# Patient Record
Sex: FEMALE | Race: BLACK OR AFRICAN AMERICAN | NOT HISPANIC OR LATINO | URBAN - METROPOLITAN AREA
[De-identification: names, ages, dates, MRNs, and addresses within clinical notes are randomized per-mention and may not be internally consistent; named-entity substitution may affect disease eponyms.]

---

## 2022-10-02 ENCOUNTER — INPATIENT (INPATIENT)
Facility: HOSPITAL | Age: 82
LOS: 4 days | Discharge: HOME HEALTH | End: 2022-10-07
Attending: HOSPITALIST | Admitting: HOSPITALIST

## 2022-10-02 VITALS
RESPIRATION RATE: 18 BRPM | TEMPERATURE: 97 F | HEART RATE: 78 BPM | DIASTOLIC BLOOD PRESSURE: 82 MMHG | SYSTOLIC BLOOD PRESSURE: 136 MMHG | OXYGEN SATURATION: 99 %

## 2022-10-02 DIAGNOSIS — N17.9 ACUTE KIDNEY FAILURE, UNSPECIFIED: ICD-10-CM

## 2022-10-02 LAB
ALBUMIN SERPL ELPH-MCNC: 4.3 G/DL — SIGNIFICANT CHANGE UP (ref 3.3–5)
ALP SERPL-CCNC: 97 U/L — SIGNIFICANT CHANGE UP (ref 40–120)
ALT FLD-CCNC: 31 U/L — SIGNIFICANT CHANGE UP (ref 4–33)
ANION GAP SERPL CALC-SCNC: 13 MMOL/L — SIGNIFICANT CHANGE UP (ref 7–14)
APPEARANCE UR: ABNORMAL
AST SERPL-CCNC: 35 U/L — HIGH (ref 4–32)
BASOPHILS # BLD AUTO: 0.01 K/UL — SIGNIFICANT CHANGE UP (ref 0–0.2)
BASOPHILS NFR BLD AUTO: 0.1 % — SIGNIFICANT CHANGE UP (ref 0–2)
BILIRUB SERPL-MCNC: 0.5 MG/DL — SIGNIFICANT CHANGE UP (ref 0.2–1.2)
BILIRUB UR-MCNC: ABNORMAL
BLOOD GAS VENOUS COMPREHENSIVE RESULT: SIGNIFICANT CHANGE UP
BUN SERPL-MCNC: 20 MG/DL — SIGNIFICANT CHANGE UP (ref 7–23)
CALCIUM SERPL-MCNC: 11.1 MG/DL — HIGH (ref 8.4–10.5)
CHLORIDE SERPL-SCNC: 99 MMOL/L — SIGNIFICANT CHANGE UP (ref 98–107)
CO2 SERPL-SCNC: 29 MMOL/L — SIGNIFICANT CHANGE UP (ref 22–31)
COLOR SPEC: YELLOW — SIGNIFICANT CHANGE UP
CREAT SERPL-MCNC: 1.51 MG/DL — HIGH (ref 0.5–1.3)
DIFF PNL FLD: ABNORMAL
EGFR: 34 ML/MIN/1.73M2 — LOW
EOSINOPHIL # BLD AUTO: 0 K/UL — SIGNIFICANT CHANGE UP (ref 0–0.5)
EOSINOPHIL NFR BLD AUTO: 0 % — SIGNIFICANT CHANGE UP (ref 0–6)
GLUCOSE SERPL-MCNC: 150 MG/DL — HIGH (ref 70–99)
GLUCOSE UR QL: ABNORMAL
HCT VFR BLD CALC: 48.6 % — HIGH (ref 34.5–45)
HGB BLD-MCNC: 15 G/DL — SIGNIFICANT CHANGE UP (ref 11.5–15.5)
IANC: 10.33 K/UL — HIGH (ref 1.8–7.4)
IMM GRANULOCYTES NFR BLD AUTO: 0.3 % — SIGNIFICANT CHANGE UP (ref 0–0.9)
KETONES UR-MCNC: NEGATIVE — SIGNIFICANT CHANGE UP
LEUKOCYTE ESTERASE UR-ACNC: NEGATIVE — SIGNIFICANT CHANGE UP
LIDOCAIN IGE QN: 32 U/L — SIGNIFICANT CHANGE UP (ref 7–60)
LYMPHOCYTES # BLD AUTO: 1.11 K/UL — SIGNIFICANT CHANGE UP (ref 1–3.3)
LYMPHOCYTES # BLD AUTO: 9 % — LOW (ref 13–44)
MCHC RBC-ENTMCNC: 28.6 PG — SIGNIFICANT CHANGE UP (ref 27–34)
MCHC RBC-ENTMCNC: 30.9 GM/DL — LOW (ref 32–36)
MCV RBC AUTO: 92.6 FL — SIGNIFICANT CHANGE UP (ref 80–100)
MONOCYTES # BLD AUTO: 0.85 K/UL — SIGNIFICANT CHANGE UP (ref 0–0.9)
MONOCYTES NFR BLD AUTO: 6.9 % — SIGNIFICANT CHANGE UP (ref 2–14)
NEUTROPHILS # BLD AUTO: 10.33 K/UL — HIGH (ref 1.8–7.4)
NEUTROPHILS NFR BLD AUTO: 83.7 % — HIGH (ref 43–77)
NITRITE UR-MCNC: NEGATIVE — SIGNIFICANT CHANGE UP
NRBC # BLD: 0 /100 WBCS — SIGNIFICANT CHANGE UP (ref 0–0)
NRBC # FLD: 0 K/UL — SIGNIFICANT CHANGE UP (ref 0–0)
PH UR: 6 — SIGNIFICANT CHANGE UP (ref 5–8)
PLATELET # BLD AUTO: 278 K/UL — SIGNIFICANT CHANGE UP (ref 150–400)
POTASSIUM SERPL-MCNC: 5.2 MMOL/L — SIGNIFICANT CHANGE UP (ref 3.5–5.3)
POTASSIUM SERPL-SCNC: 5.2 MMOL/L — SIGNIFICANT CHANGE UP (ref 3.5–5.3)
PROT SERPL-MCNC: 8.7 G/DL — HIGH (ref 6–8.3)
PROT UR-MCNC: ABNORMAL
RBC # BLD: 5.25 M/UL — HIGH (ref 3.8–5.2)
RBC # FLD: 12.9 % — SIGNIFICANT CHANGE UP (ref 10.3–14.5)
SARS-COV-2 RNA SPEC QL NAA+PROBE: SIGNIFICANT CHANGE UP
SODIUM SERPL-SCNC: 141 MMOL/L — SIGNIFICANT CHANGE UP (ref 135–145)
SP GR SPEC: 1.02 — SIGNIFICANT CHANGE UP (ref 1.01–1.05)
TROPONIN T, HIGH SENSITIVITY RESULT: 61 NG/L — CRITICAL HIGH
TROPONIN T, HIGH SENSITIVITY RESULT: 80 NG/L — CRITICAL HIGH
UROBILINOGEN FLD QL: SIGNIFICANT CHANGE UP
WBC # BLD: 12.34 K/UL — HIGH (ref 3.8–10.5)
WBC # FLD AUTO: 12.34 K/UL — HIGH (ref 3.8–10.5)

## 2022-10-02 PROCEDURE — 71045 X-RAY EXAM CHEST 1 VIEW: CPT | Mod: 26

## 2022-10-02 PROCEDURE — 99285 EMERGENCY DEPT VISIT HI MDM: CPT

## 2022-10-02 PROCEDURE — 74177 CT ABD & PELVIS W/CONTRAST: CPT | Mod: 26,MA

## 2022-10-02 RX ORDER — SODIUM CHLORIDE 9 MG/ML
1000 INJECTION INTRAMUSCULAR; INTRAVENOUS; SUBCUTANEOUS ONCE
Refills: 0 | Status: COMPLETED | OUTPATIENT
Start: 2022-10-02 | End: 2022-10-02

## 2022-10-02 RX ADMIN — SODIUM CHLORIDE 1000 MILLILITER(S): 9 INJECTION INTRAMUSCULAR; INTRAVENOUS; SUBCUTANEOUS at 20:02

## 2022-10-02 NOTE — ED PROVIDER NOTE - CLINICAL SUMMARY MEDICAL DECISION MAKING FREE TEXT BOX
a/p-82f presents to ed for lethargy today, generalized weakness, and episode of collapse while walking though did NOT pass out. Patient does not recall events which is normal for her per her daughter 2/2 her dementia-Until today has been at baseline SOH. On exam, vs noted, ao2 (baseline), unlabored breathing, clear lungs, abd soft, tender diffusely, exts warm and well perfused, neuro ex with no localizing deficits. Plan for labs, ua/cx, ekg, cxr, ct a/p-eval for diff including but not limited to anemia, elec disturbance, organ dysfunction, infection, obstruction. Hydrate as appears dry. Plan d/w patient and daughter at bedside who are agreeable.

## 2022-10-02 NOTE — ED ADULT NURSE REASSESSMENT NOTE - NS ED NURSE REASSESS COMMENT FT1
Report given to Carmen UPTON in [facility], Patient transported there at this time, no acute distress, vital signs stable, daughter at bedside, all needs met.

## 2022-10-02 NOTE — ED ADULT NURSE REASSESSMENT NOTE - NS ED NURSE REASSESS COMMENT FT1
Patient bradycardic, awake and alert, no acute distress, otherwise vital signs stable, Saleem RABAGO made aware, awaiting further orders/disposition.

## 2022-10-02 NOTE — ED ADULT NURSE REASSESSMENT NOTE - NS ED NURSE REASSESS COMMENT FT1
Patient back from CT, resting in bed comfortably, no acute distress, respirations equal and nonlabored, awaiting further orders/results.

## 2022-10-02 NOTE — ED ADULT NURSE NOTE - OBJECTIVE STATEMENT
82 yof presents A&Ox2 at baseline mental status per daughter at bedside due to hx of dementia. Per daughter, pt was taking a shower then collapsed while waking with daughter who assisted her to the floor. Was awake, denies any LOC or head trauma. No blood thinner use. Daughter states pt has been more lethargic and weak today. Respirations even and unlabored, sating 99% on RA, normal sinus on monitor. Pt denies any chest pain, dyspnea, dizziness, blurry vison, N/V/D. 20g IV placed in R AC, labs and covid swab collected per order. No acute distress noted upon leaving room. bed in lowest position, side rails up, call bell in hand, safety maintained.

## 2022-10-02 NOTE — ED PROVIDER NOTE - PHYSICAL EXAMINATION
GEN - NAD; well appearing; A+O x3   HEAD - NC/AT   EYES- PERRL, EOMI  ENT: Airway patent, mmm, Oral cavity and pharynx normal. No inflammation, swelling, exudate, or lesions.    NECK: Neck supple  PULMONARY - CTA b/l, symmetric breath sounds.   CARDIAC -s1s2, RRR, no M,G,R  ABDOMEN - +BS, ND, NT, soft, no guarding, no rebound, no masses   BACK - no CVA tenderness, Normal  spine   EXTREMITIES - FROM, no edema, no deformity  SKIN - no rash or bruising   NEUROLOGIC - alert, speech clear, no focal deficits  PSYCH -nl mood/affect, nl insight. GEN - NAD; well appearing; A+O to name, place  HEAD - NC/AT   EYES- PERRL, EOMI  ENT: Airway patent, dry mm, Oral cavity and pharynx normal. No inflammation, swelling, exudate, or lesions.    NECK: Neck supple  PULMONARY - CTA b/l, symmetric breath sounds.   CARDIAC -s1s2, RRR, no M,G,R  ABDOMEN - +BS, ND, + tender diffusely, no mass palpated  BACK - no CVA tenderness, Normal  spine   EXTREMITIES - FROM, no edema, no deformity  SKIN - no rash or bruising   NEUROLOGIC - alert, speech clear, 5/5 strenght bl ue and le, SILT, cn2-12 intact, no pronator drift.  PSYCH -limited insight into condition, calm, pleasant

## 2022-10-02 NOTE — ED PROVIDER NOTE - OBJECTIVE STATEMENT
81 y/o f presents to the ed with lethargy and generalized weakness-NOT SYNCOPE. Patient has h/o dementia-does not recall events of today. Reports she feels well at this time. Reports she has had intermittent discomfort to her abd in past typically after eating, none at this time. Daughter provides collateral at bedside-states today patient was much more tired than usual, slept a lot during the day. Was in the shower and daughter assisted her our, and while she was walking she collapsed. She was awake the entire time per the daughter-the daughter was holding her and assisted her down to the ground, no trauma, she was conversing the whole time but was very generally weak, and now back to baseline. Per daughter-patient has poor recall at baseline. She reports until today patient has been acting at her baseline. No recent fevers, cough, vomiting, diarrhea. Has no c/o ha/cp/sob. She is incontinent at baseline.

## 2022-10-02 NOTE — ED ADULT TRIAGE NOTE - CHIEF COMPLAINT QUOTE
pt brought in by ems for syncopal episode after daughter walked pt to bathroom to shower, on way back to room pt had 1 min LOC, denies head truam, fall.  PMH: mi, dementia, htn, hld, prediabetes, fs= `154

## 2022-10-02 NOTE — ED ADULT NURSE REASSESSMENT NOTE - NS ED NURSE REASSESS COMMENT FT1
Straight cath performed per order, sterility utilized and maintained. 100cc of dark urine output. Pt tolerated well. Urine collected and sent per order.

## 2022-10-03 DIAGNOSIS — R53.1 WEAKNESS: ICD-10-CM

## 2022-10-03 DIAGNOSIS — Z90.5 ACQUIRED ABSENCE OF KIDNEY: Chronic | ICD-10-CM

## 2022-10-03 DIAGNOSIS — R63.8 OTHER SYMPTOMS AND SIGNS CONCERNING FOOD AND FLUID INTAKE: ICD-10-CM

## 2022-10-03 DIAGNOSIS — D72.829 ELEVATED WHITE BLOOD CELL COUNT, UNSPECIFIED: ICD-10-CM

## 2022-10-03 DIAGNOSIS — I10 ESSENTIAL (PRIMARY) HYPERTENSION: ICD-10-CM

## 2022-10-03 DIAGNOSIS — N39.0 URINARY TRACT INFECTION, SITE NOT SPECIFIED: ICD-10-CM

## 2022-10-03 DIAGNOSIS — R80.9 PROTEINURIA, UNSPECIFIED: ICD-10-CM

## 2022-10-03 DIAGNOSIS — F03.90 UNSPECIFIED DEMENTIA WITHOUT BEHAVIORAL DISTURBANCE: ICD-10-CM

## 2022-10-03 DIAGNOSIS — N17.9 ACUTE KIDNEY FAILURE, UNSPECIFIED: ICD-10-CM

## 2022-10-03 DIAGNOSIS — E78.5 HYPERLIPIDEMIA, UNSPECIFIED: ICD-10-CM

## 2022-10-03 DIAGNOSIS — R00.1 BRADYCARDIA, UNSPECIFIED: ICD-10-CM

## 2022-10-03 LAB
ALBUMIN SERPL ELPH-MCNC: 3.5 G/DL — SIGNIFICANT CHANGE UP (ref 3.3–5)
ALBUMIN SERPL ELPH-MCNC: 3.5 G/DL — SIGNIFICANT CHANGE UP (ref 3.3–5)
ALP SERPL-CCNC: 72 U/L — SIGNIFICANT CHANGE UP (ref 40–120)
ALP SERPL-CCNC: 79 U/L — SIGNIFICANT CHANGE UP (ref 40–120)
ALT FLD-CCNC: 18 U/L — SIGNIFICANT CHANGE UP (ref 4–33)
ALT FLD-CCNC: 21 U/L — SIGNIFICANT CHANGE UP (ref 4–33)
ANION GAP SERPL CALC-SCNC: 12 MMOL/L — SIGNIFICANT CHANGE UP (ref 7–14)
ANION GAP SERPL CALC-SCNC: 12 MMOL/L — SIGNIFICANT CHANGE UP (ref 7–14)
APPEARANCE UR: CLEAR — SIGNIFICANT CHANGE UP
AST SERPL-CCNC: 29 U/L — SIGNIFICANT CHANGE UP (ref 4–32)
AST SERPL-CCNC: 30 U/L — SIGNIFICANT CHANGE UP (ref 4–32)
BACTERIA # UR AUTO: NEGATIVE — SIGNIFICANT CHANGE UP
BASOPHILS # BLD AUTO: 0.02 K/UL — SIGNIFICANT CHANGE UP (ref 0–0.2)
BASOPHILS NFR BLD AUTO: 0.2 % — SIGNIFICANT CHANGE UP (ref 0–2)
BILIRUB SERPL-MCNC: 0.4 MG/DL — SIGNIFICANT CHANGE UP (ref 0.2–1.2)
BILIRUB SERPL-MCNC: 0.5 MG/DL — SIGNIFICANT CHANGE UP (ref 0.2–1.2)
BILIRUB UR-MCNC: NEGATIVE — SIGNIFICANT CHANGE UP
BLOOD GAS VENOUS COMPREHENSIVE RESULT: SIGNIFICANT CHANGE UP
BUN SERPL-MCNC: 27 MG/DL — HIGH (ref 7–23)
BUN SERPL-MCNC: 28 MG/DL — HIGH (ref 7–23)
CALCIUM SERPL-MCNC: 10.1 MG/DL — SIGNIFICANT CHANGE UP (ref 8.4–10.5)
CALCIUM SERPL-MCNC: 10.1 MG/DL — SIGNIFICANT CHANGE UP (ref 8.4–10.5)
CHLORIDE SERPL-SCNC: 101 MMOL/L — SIGNIFICANT CHANGE UP (ref 98–107)
CHLORIDE SERPL-SCNC: 99 MMOL/L — SIGNIFICANT CHANGE UP (ref 98–107)
CK MB CFR SERPL CALC: 3.2 NG/ML — SIGNIFICANT CHANGE UP
CK SERPL-CCNC: 142 U/L — SIGNIFICANT CHANGE UP (ref 25–170)
CO2 SERPL-SCNC: 25 MMOL/L — SIGNIFICANT CHANGE UP (ref 22–31)
CO2 SERPL-SCNC: 25 MMOL/L — SIGNIFICANT CHANGE UP (ref 22–31)
COLOR SPEC: SIGNIFICANT CHANGE UP
CREAT ?TM UR-MCNC: 89 MG/DL — SIGNIFICANT CHANGE UP
CREAT ?TM UR-MCNC: 90 MG/DL — SIGNIFICANT CHANGE UP
CREAT SERPL-MCNC: 1.39 MG/DL — HIGH (ref 0.5–1.3)
CREAT SERPL-MCNC: 1.42 MG/DL — HIGH (ref 0.5–1.3)
DIFF PNL FLD: NEGATIVE — SIGNIFICANT CHANGE UP
EGFR: 37 ML/MIN/1.73M2 — LOW
EGFR: 38 ML/MIN/1.73M2 — LOW
EOSINOPHIL # BLD AUTO: 0.02 K/UL — SIGNIFICANT CHANGE UP (ref 0–0.5)
EOSINOPHIL NFR BLD AUTO: 0.2 % — SIGNIFICANT CHANGE UP (ref 0–6)
EPI CELLS # UR: 5 /HPF — SIGNIFICANT CHANGE UP (ref 0–5)
FOLATE SERPL-MCNC: 10.5 NG/ML — SIGNIFICANT CHANGE UP (ref 3.1–17.5)
GLUCOSE SERPL-MCNC: 114 MG/DL — HIGH (ref 70–99)
GLUCOSE SERPL-MCNC: 96 MG/DL — SIGNIFICANT CHANGE UP (ref 70–99)
GLUCOSE UR QL: NEGATIVE — SIGNIFICANT CHANGE UP
HAV IGM SER-ACNC: SIGNIFICANT CHANGE UP
HBV CORE AB SER-ACNC: SIGNIFICANT CHANGE UP
HBV CORE IGM SER-ACNC: SIGNIFICANT CHANGE UP
HBV SURFACE AG SER-ACNC: SIGNIFICANT CHANGE UP
HCT VFR BLD CALC: 40.9 % — SIGNIFICANT CHANGE UP (ref 34.5–45)
HCT VFR BLD CALC: 43.1 % — SIGNIFICANT CHANGE UP (ref 34.5–45)
HCV AB S/CO SERPL IA: 0.09 S/CO — SIGNIFICANT CHANGE UP (ref 0–0.99)
HCV AB SERPL-IMP: SIGNIFICANT CHANGE UP
HGB BLD-MCNC: 12.9 G/DL — SIGNIFICANT CHANGE UP (ref 11.5–15.5)
HGB BLD-MCNC: 13.4 G/DL — SIGNIFICANT CHANGE UP (ref 11.5–15.5)
HIV 1+2 AB+HIV1 P24 AG SERPL QL IA: SIGNIFICANT CHANGE UP
HYALINE CASTS # UR AUTO: 0 /LPF — SIGNIFICANT CHANGE UP (ref 0–7)
IANC: 6.74 K/UL — SIGNIFICANT CHANGE UP (ref 1.8–7.4)
IMM GRANULOCYTES NFR BLD AUTO: 0.2 % — SIGNIFICANT CHANGE UP (ref 0–0.9)
KETONES UR-MCNC: NEGATIVE — SIGNIFICANT CHANGE UP
LEUKOCYTE ESTERASE UR-ACNC: NEGATIVE — SIGNIFICANT CHANGE UP
LYMPHOCYTES # BLD AUTO: 1.96 K/UL — SIGNIFICANT CHANGE UP (ref 1–3.3)
LYMPHOCYTES # BLD AUTO: 20.9 % — SIGNIFICANT CHANGE UP (ref 13–44)
MAGNESIUM SERPL-MCNC: 1.9 MG/DL — SIGNIFICANT CHANGE UP (ref 1.6–2.6)
MAGNESIUM SERPL-MCNC: 2.1 MG/DL — SIGNIFICANT CHANGE UP (ref 1.6–2.6)
MCHC RBC-ENTMCNC: 28.5 PG — SIGNIFICANT CHANGE UP (ref 27–34)
MCHC RBC-ENTMCNC: 28.7 PG — SIGNIFICANT CHANGE UP (ref 27–34)
MCHC RBC-ENTMCNC: 31.1 GM/DL — LOW (ref 32–36)
MCHC RBC-ENTMCNC: 31.5 GM/DL — LOW (ref 32–36)
MCV RBC AUTO: 90.9 FL — SIGNIFICANT CHANGE UP (ref 80–100)
MCV RBC AUTO: 91.7 FL — SIGNIFICANT CHANGE UP (ref 80–100)
MONOCYTES # BLD AUTO: 0.63 K/UL — SIGNIFICANT CHANGE UP (ref 0–0.9)
MONOCYTES NFR BLD AUTO: 6.7 % — SIGNIFICANT CHANGE UP (ref 2–14)
NEUTROPHILS # BLD AUTO: 6.74 K/UL — SIGNIFICANT CHANGE UP (ref 1.8–7.4)
NEUTROPHILS NFR BLD AUTO: 71.8 % — SIGNIFICANT CHANGE UP (ref 43–77)
NITRITE UR-MCNC: NEGATIVE — SIGNIFICANT CHANGE UP
NRBC # BLD: 0 /100 WBCS — SIGNIFICANT CHANGE UP (ref 0–0)
NRBC # BLD: 0 /100 WBCS — SIGNIFICANT CHANGE UP (ref 0–0)
NRBC # FLD: 0 K/UL — SIGNIFICANT CHANGE UP (ref 0–0)
NRBC # FLD: 0 K/UL — SIGNIFICANT CHANGE UP (ref 0–0)
OSMOLALITY UR: 301 MOSM/KG — SIGNIFICANT CHANGE UP (ref 50–1200)
PH UR: 6 — SIGNIFICANT CHANGE UP (ref 5–8)
PHOSPHATE SERPL-MCNC: 3.1 MG/DL — SIGNIFICANT CHANGE UP (ref 2.5–4.5)
PHOSPHATE SERPL-MCNC: 3.4 MG/DL — SIGNIFICANT CHANGE UP (ref 2.5–4.5)
PLATELET # BLD AUTO: 218 K/UL — SIGNIFICANT CHANGE UP (ref 150–400)
PLATELET # BLD AUTO: 242 K/UL — SIGNIFICANT CHANGE UP (ref 150–400)
POTASSIUM SERPL-MCNC: 4 MMOL/L — SIGNIFICANT CHANGE UP (ref 3.5–5.3)
POTASSIUM SERPL-MCNC: 4.2 MMOL/L — SIGNIFICANT CHANGE UP (ref 3.5–5.3)
POTASSIUM SERPL-SCNC: 4 MMOL/L — SIGNIFICANT CHANGE UP (ref 3.5–5.3)
POTASSIUM SERPL-SCNC: 4.2 MMOL/L — SIGNIFICANT CHANGE UP (ref 3.5–5.3)
PROT ?TM UR-MCNC: 20 MG/DL — SIGNIFICANT CHANGE UP
PROT ?TM UR-MCNC: 20 MG/DL — SIGNIFICANT CHANGE UP
PROT SERPL-MCNC: 7.4 G/DL — SIGNIFICANT CHANGE UP (ref 6–8.3)
PROT SERPL-MCNC: 7.5 G/DL — SIGNIFICANT CHANGE UP (ref 6–8.3)
PROT SERPL-MCNC: 7.7 G/DL — SIGNIFICANT CHANGE UP (ref 6–8.3)
PROT UR-MCNC: ABNORMAL
PROT/CREAT UR-RTO: 0.2 RATIO — SIGNIFICANT CHANGE UP (ref 0–0.2)
RBC # BLD: 4.5 M/UL — SIGNIFICANT CHANGE UP (ref 3.8–5.2)
RBC # BLD: 4.7 M/UL — SIGNIFICANT CHANGE UP (ref 3.8–5.2)
RBC # FLD: 13.1 % — SIGNIFICANT CHANGE UP (ref 10.3–14.5)
RBC # FLD: 13.3 % — SIGNIFICANT CHANGE UP (ref 10.3–14.5)
RBC CASTS # UR COMP ASSIST: 1 /HPF — SIGNIFICANT CHANGE UP (ref 0–4)
SODIUM SERPL-SCNC: 136 MMOL/L — SIGNIFICANT CHANGE UP (ref 135–145)
SODIUM SERPL-SCNC: 138 MMOL/L — SIGNIFICANT CHANGE UP (ref 135–145)
SODIUM UR-SCNC: <20 MMOL/L — SIGNIFICANT CHANGE UP
SP GR SPEC: 1.02 — SIGNIFICANT CHANGE UP (ref 1.01–1.05)
TROPONIN T, HIGH SENSITIVITY RESULT: 42 NG/L — SIGNIFICANT CHANGE UP
TSH SERPL-MCNC: 1.72 UIU/ML — SIGNIFICANT CHANGE UP (ref 0.27–4.2)
TSH SERPL-MCNC: 1.73 UIU/ML — SIGNIFICANT CHANGE UP (ref 0.27–4.2)
UROBILINOGEN FLD QL: SIGNIFICANT CHANGE UP
VIT B12 SERPL-MCNC: 1149 PG/ML — HIGH (ref 200–900)
WBC # BLD: 9.39 K/UL — SIGNIFICANT CHANGE UP (ref 3.8–10.5)
WBC # BLD: 9.63 K/UL — SIGNIFICANT CHANGE UP (ref 3.8–10.5)
WBC # FLD AUTO: 9.39 K/UL — SIGNIFICANT CHANGE UP (ref 3.8–10.5)
WBC # FLD AUTO: 9.63 K/UL — SIGNIFICANT CHANGE UP (ref 3.8–10.5)
WBC UR QL: 2 /HPF — SIGNIFICANT CHANGE UP (ref 0–5)

## 2022-10-03 PROCEDURE — 99223 1ST HOSP IP/OBS HIGH 75: CPT

## 2022-10-03 PROCEDURE — 84165 PROTEIN E-PHORESIS SERUM: CPT | Mod: 26

## 2022-10-03 PROCEDURE — 93010 ELECTROCARDIOGRAM REPORT: CPT

## 2022-10-03 PROCEDURE — 86334 IMMUNOFIX E-PHORESIS SERUM: CPT | Mod: 26

## 2022-10-03 PROCEDURE — 12345: CPT | Mod: NC

## 2022-10-03 PROCEDURE — 99233 SBSQ HOSP IP/OBS HIGH 50: CPT

## 2022-10-03 PROCEDURE — 93306 TTE W/DOPPLER COMPLETE: CPT | Mod: 26

## 2022-10-03 RX ORDER — CEFTRIAXONE 500 MG/1
1000 INJECTION, POWDER, FOR SOLUTION INTRAMUSCULAR; INTRAVENOUS EVERY 24 HOURS
Refills: 0 | Status: COMPLETED | OUTPATIENT
Start: 2022-10-03 | End: 2022-10-05

## 2022-10-03 RX ORDER — ACETAMINOPHEN 500 MG
650 TABLET ORAL EVERY 6 HOURS
Refills: 0 | Status: DISCONTINUED | OUTPATIENT
Start: 2022-10-03 | End: 2022-10-07

## 2022-10-03 RX ORDER — INFLUENZA VIRUS VACCINE 15; 15; 15; 15 UG/.5ML; UG/.5ML; UG/.5ML; UG/.5ML
0.7 SUSPENSION INTRAMUSCULAR ONCE
Refills: 0 | Status: DISCONTINUED | OUTPATIENT
Start: 2022-10-03 | End: 2022-10-07

## 2022-10-03 RX ORDER — ATORVASTATIN CALCIUM 80 MG/1
40 TABLET, FILM COATED ORAL AT BEDTIME
Refills: 0 | Status: DISCONTINUED | OUTPATIENT
Start: 2022-10-03 | End: 2022-10-04

## 2022-10-03 RX ORDER — SODIUM CHLORIDE 9 MG/ML
1000 INJECTION INTRAMUSCULAR; INTRAVENOUS; SUBCUTANEOUS
Refills: 0 | Status: DISCONTINUED | OUTPATIENT
Start: 2022-10-03 | End: 2022-10-05

## 2022-10-03 RX ORDER — ASPIRIN/CALCIUM CARB/MAGNESIUM 324 MG
81 TABLET ORAL DAILY
Refills: 0 | Status: DISCONTINUED | OUTPATIENT
Start: 2022-10-03 | End: 2022-10-07

## 2022-10-03 RX ORDER — AMLODIPINE BESYLATE 2.5 MG/1
5 TABLET ORAL DAILY
Refills: 0 | Status: DISCONTINUED | OUTPATIENT
Start: 2022-10-03 | End: 2022-10-07

## 2022-10-03 RX ORDER — DONEPEZIL HYDROCHLORIDE 10 MG/1
10 TABLET, FILM COATED ORAL AT BEDTIME
Refills: 0 | Status: DISCONTINUED | OUTPATIENT
Start: 2022-10-03 | End: 2022-10-03

## 2022-10-03 RX ORDER — HEPARIN SODIUM 5000 [USP'U]/ML
5000 INJECTION INTRAVENOUS; SUBCUTANEOUS EVERY 12 HOURS
Refills: 0 | Status: COMPLETED | OUTPATIENT
Start: 2022-10-03 | End: 2022-10-05

## 2022-10-03 RX ADMIN — HEPARIN SODIUM 5000 UNIT(S): 5000 INJECTION INTRAVENOUS; SUBCUTANEOUS at 06:43

## 2022-10-03 RX ADMIN — Medication 81 MILLIGRAM(S): at 12:46

## 2022-10-03 RX ADMIN — ATORVASTATIN CALCIUM 40 MILLIGRAM(S): 80 TABLET, FILM COATED ORAL at 21:24

## 2022-10-03 RX ADMIN — HEPARIN SODIUM 5000 UNIT(S): 5000 INJECTION INTRAVENOUS; SUBCUTANEOUS at 17:37

## 2022-10-03 RX ADMIN — CEFTRIAXONE 100 MILLIGRAM(S): 500 INJECTION, POWDER, FOR SOLUTION INTRAMUSCULAR; INTRAVENOUS at 03:02

## 2022-10-03 RX ADMIN — SODIUM CHLORIDE 75 MILLILITER(S): 9 INJECTION INTRAMUSCULAR; INTRAVENOUS; SUBCUTANEOUS at 17:40

## 2022-10-03 RX ADMIN — SODIUM CHLORIDE 75 MILLILITER(S): 9 INJECTION INTRAMUSCULAR; INTRAVENOUS; SUBCUTANEOUS at 19:48

## 2022-10-03 RX ADMIN — AMLODIPINE BESYLATE 5 MILLIGRAM(S): 2.5 TABLET ORAL at 17:37

## 2022-10-03 NOTE — H&P ADULT - PROBLEM SELECTOR PLAN 5
-as above, concern MDS given CT findings  -Hg and platelets wnl  -may need outpt heme eval based on labwork findings  -also check folate and B12

## 2022-10-03 NOTE — CONSULT NOTE ADULT - SUBJECTIVE AND OBJECTIVE BOX
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 infection in September (treated with 1 week ABx by PCP) who presents to ED s/p fall with generalized weakness yesterday. Patient endorses moderate persistent RLQ pain.  Patient's daughter who caught her mother as she was falling denies LOC.  Patient was found to have UTI and was started on IV Ceftriaxone.  Today while waiting for echocardiogram patient reportedly unresponsive, a RRT called patient was noted in sinus myron with slowest HR at 45 and /79.  Patient doesn't recall the loss of consciousness, she denies hx of near syncope/syncope or lightheadedness or dizziness.    A TTE showed normal LVEF w/o valvular diseases.  Baseline EKG demonstrates NSR at 80 bpm.  HR trend range from 48 to 80's bpm on telemetry.  No AV blocks or myron <40 bpm seen so far.  Patient is hemodynamically stable at present time.          PAST MEDICAL & SURGICAL HISTORY:  Dementia      HTN (hypertension)      HLD (hyperlipidemia)      H/O right nephrectomy    MEDICATIONS  (STANDING):  aspirin  chewable 81 milliGRAM(s) Oral daily  atorvastatin 40 milliGRAM(s) Oral at bedtime  cefTRIAXone   IVPB 1000 milliGRAM(s) IV Intermittent every 24 hours  donepezil 10 milliGRAM(s) Oral at bedtime  heparin   Injectable 5000 Unit(s) SubCutaneous every 12 hours    MEDICATIONS  (PRN):  acetaminophen     Tablet .. 650 milliGRAM(s) Oral every 6 hours PRN Temp greater or equal to 38C (100.4F), Mild Pain (1 - 3)    Allergies    Lipitor (Rash)    Intolerances      FAMILY HISTORY:  FH: HTN (hypertension) (Father)        SOCIAL HISTORY:  Denies smoking; no   Alcohol  or  Drug abuse         REVIEW OF SYSTEMS:    CONSTITUTIONAL: No fever, weight loss, chills, shakes, or fatigue +weakness  EYES: No eye pain, visual disturbances, or discharge  ENMT:  No difficulty hearing, tinnitus, vertigo; No sinus or throat pain  NECK: No pain or stiffness  RESPIRATORY: No cough, wheezing, hemoptysis, or shortness of breath  CARDIOVASCULAR: No chest pain, dyspnea, palpitations, dizziness, syncope, paroxysmal nocturnal dyspnea, orthopnea, or arm or leg swelling  GASTROINTESTINAL: No abdominal  or epigastric pain, nausea, vomiting, hematemesis, diarrhea, constipation, melena or bright red blood.  GENITOURINARY: No dysuria, nocturia, hematuria, or urinary incontinence  NEUROLOGICAL: No headaches, memory loss, slurred speech, limb weakness, loss of strength, numbness, or tremors  SKIN: No itching, burning, rashes, or lesions   LYMPH NODES: No enlarged glands  ENDOCRINE: No heat or cold intolerance, or hair loss  MUSCULOSKELETAL: No joint pain or swelling, muscle, back, or extremity pain  PSYCHIATRIC: No depression, anxiety, or difficulty sleeping  HEME/LYMPH: No easy bruising or bleeding gums  ALLERY AND IMMUNOLOGIC: No hives or rash.      Vital Signs Last 24 Hrs  T(C): 36.5 (03 Oct 2022 13:06), Max: 37 (03 Oct 2022 06:30)  T(F): 97.7 (03 Oct 2022 13:06), Max: 98.6 (03 Oct 2022 06:30)  HR: 60 (03 Oct 2022 13:06) (45 - 78)  BP: 147/79 (03 Oct 2022 13:06) (136/72 - 167/64)  BP(mean): --  RR: 16 (03 Oct 2022 13:06) (16 - 18)  SpO2: 100% (03 Oct 2022 13:06) (99% - 100%)    Parameters below as of 03 Oct 2022 13:06  Patient On (Oxygen Delivery Method): room air        PHYSICAL EXAM:    GENERAL: In no apparent distress, well nourished, and hydrated.  HEAD:  Atraumatic, Normocephalic  NECK: Supple . No JVD or carotid bruit or thyroidmegaly.  Carotid pulse is 2+ bilaterally.  PULMONARY: Clear to auscultation and perfusion.  No rales, wheezing, or rhonchi bilaterally.  HEART: Regular rate and rhythm; No murmurs, rubs, or gallops.  ABDOMEN: Soft, Nontender, Nondistended; Bowel sounds present  EXTREMITIES: No clubbing, cyanosis, or edema  NEUROLOGICAL: Alert oriented to person, place and time.  Speech clear.  Skin: Dry intact, no rashes or lesions.          INTERPRETATION OF TELEMETRY:  Sinus rhythm with occasional PVC's/APC's    ECG:         LABS:                        12.9   9.63  )-----------( 218      ( 03 Oct 2022 11:45 )             40.9     10-    136  |  99  |  28<H>  ----------------------------<  114<H>  4.0   |  25  |  1.39<H>    Ca    10.1      03 Oct 2022 11:45  Phos  3.1     10-  Mg     2.10     10-    TPro  7.4  /  Alb  3.5  /  TBili  0.4  /  DBili  x   /  AST  30  /  ALT  21  /  AlkPhos  72  10-    CARDIAC MARKERS ( 02 Oct 2022 21:00 )  x     / x     / 142 U/L / x     / 3.2 ng/mL  Thyroid Stimulating Hormone, Serum: 1.73 uIU/mL (10.03.22 @ 07:00)          Urinalysis Basic - ( 02 Oct 2022 19:59 )    Color: Yellow / Appearance: Slightly Turbid / S.025 / pH: x  Gluc: x / Ketone: Negative  / Bili: Small / Urobili: <2 mg/dL   Blood: x / Protein: >600 mg/dL / Nitrite: Negative   Leuk Esterase: Negative / RBC: 6 /HPF / WBC 21 /HPF   Sq Epi: x / Non Sq Epi: >27 /HPF / Bacteria: Few        BNP  RADIOLOGY & ADDITIONAL STUDIES:      INTERPRETATION:  CLINICAL INFORMATION: Cough.    EXAM: 1 view of the chest.    COMPARISON: None.    FINDINGS:  Clear lungs. No pleural effusion or pneumothorax.  The heart is difficult to evaluate on this view.  No acute osseous findings.    IMPRESSION:  Clear lungs.    --- End of Report ---            PREVIOUS DIAGNOSTIC TESTING:      ECHO FINDINGS:  PROCEDURE: Transthoracic echocardiogram with 2-D, M-Mode  and complete spectral and color flow Doppler.  INDICATION: Abnormal electrocardiogram (ECG) (EKG) (R94.31)  ------------------------------------------------------------------------  DIMENSIONS:  Dimensions:     Normal Values:  LA:     3.3 cm    2.0 - 4.0 cm  Ao:     2.6 cm    2.0 - 3.8 cm  SEPTUM: 0.8 cm    0.6 - 1.2 cm  PWT:    0.8 cm    0.6 - 1.1 cm  LVIDd:  4.2 cm    3.0 - 5.6 cm  LVIDs:  2.6 cm    1.8 - 4.0 cm  Derived Variables:  LVMI: 55 g/m2  RWT: 0.38  Fractional short: 38 %  Ejection Fraction (Modified Patel Rule): 69 %  ------------------------------------------------------------------------  OBSERVATIONS:  Mitral Valve: Mitral annular calcification, otherwise  normal mitral valve. Minimal mitral regurgitation.  Aortic Root: Normal aortic root.  Aortic Valve: Aortic valve not well visualized.  Left Atrium: Normal left atrium.  Left Ventricle: Normal left ventricular systolic function.  No segmental wall motion abnormalities. Normal left  ventricular internal dimensions and wall thicknesses.  Right Heart: Normal right atrium. Normal right ventricular  size and function. Normal tricuspid valve. Mild-moderate  tricuspid regurgitation. Normal pulmonic valve. Minimal  pulmonic regurgitation.  Pericardium/PleuraNormal pericardium with no pericardial  effusion.  ------------------------------------------------------------------------  CONCLUSIONS:  1. Mitral annular calcification, otherwise normal mitral  valve. Minimal mitral regurgitation.  2. Normal left ventricular internal dimensions and wall  thicknesses.  3. Normal left ventricular systolic function. No segmental  wall motion abnormalities.  4. Normal right ventricular size and function.  ------------------------------------------------------------------------  Confirmed on  10/3/2022 - 12:08:45 by Sarthak Melo M.D.,  [facility], [place]  ------------------------------------------------------------------------    STRESS FINDINGS:    CATHETERIZATION FINDINGS:       Patient is a 82y old  Female who presents with a chief complaint of Near collapse (03 Oct 2022 11:13)          HPI:    82 year old female with a PMH of HTN, HLD, mild dementia, R nephrectomy (as a child unknown etiology) and recent /GI ? infection in September (treated with 1 week ABx by PCP) who presents to ED s/p fall with generalized weakness yesterday. Patient endorses moderate persistent RLQ pain.  Patient's daughter who caught her mother as she was falling denies LOC.  Patient was found to have UTI and was started on IV Ceftriaxone.  Today while waiting for echocardiogram patient reportedly unresponsive, a RRT called patient was noted in sinus myron with slowest HR at 45 and /79.  Patient doesn't recall the loss of consciousness, she denies hx of near syncope/syncope or lightheadedness or dizziness.    A TTE showed normal LVEF w/o valvular diseases.  Baseline EKG demonstrates NSR at 80 bpm.  HR trend range from 48 to 80's bpm on telemetry.  No AV blocks or myron <40 bpm seen so far.  Patient is hemodynamically stable at present time.  CT of abd ruled out acute pathology.          PAST MEDICAL & SURGICAL HISTORY:  Dementia      HTN (hypertension)      HLD (hyperlipidemia)      H/O right nephrectomy    MEDICATIONS  (STANDING):  aspirin  chewable 81 milliGRAM(s) Oral daily  atorvastatin 40 milliGRAM(s) Oral at bedtime  cefTRIAXone   IVPB 1000 milliGRAM(s) IV Intermittent every 24 hours  donepezil 10 milliGRAM(s) Oral at bedtime  heparin   Injectable 5000 Unit(s) SubCutaneous every 12 hours    MEDICATIONS  (PRN):  acetaminophen     Tablet .. 650 milliGRAM(s) Oral every 6 hours PRN Temp greater or equal to 38C (100.4F), Mild Pain (1 - 3)    Allergies    Lipitor (Rash)    Intolerances      FAMILY HISTORY:  FH: HTN (hypertension) (Father)        SOCIAL HISTORY:  Denies smoking; no   Alcohol  or  Drug abuse         REVIEW OF SYSTEMS:    CONSTITUTIONAL: No fever, weight loss, chills, shakes, or fatigue +weakness  EYES: No eye pain, visual disturbances, or discharge  ENMT:  No difficulty hearing, tinnitus, vertigo; No sinus or throat pain  NECK: No pain or stiffness  RESPIRATORY: No cough, wheezing, hemoptysis, or shortness of breath  CARDIOVASCULAR: No chest pain, dyspnea, palpitations, dizziness, syncope, paroxysmal nocturnal dyspnea, orthopnea, or arm or leg swelling  GASTROINTESTINAL: No abdominal  or epigastric pain, nausea, vomiting, hematemesis, diarrhea, constipation, melena or bright red blood.  GENITOURINARY: No dysuria, nocturia, hematuria, or urinary incontinence  NEUROLOGICAL: No headaches, memory loss, slurred speech, limb weakness, loss of strength, numbness, or tremors  SKIN: No itching, burning, rashes, or lesions   LYMPH NODES: No enlarged glands  ENDOCRINE: No heat or cold intolerance, or hair loss  MUSCULOSKELETAL: No joint pain or swelling, muscle, back, or extremity pain  PSYCHIATRIC: No depression, anxiety, or difficulty sleeping  HEME/LYMPH: No easy bruising or bleeding gums  ALLERY AND IMMUNOLOGIC: No hives or rash.      Vital Signs Last 24 Hrs  T(C): 36.5 (03 Oct 2022 13:06), Max: 37 (03 Oct 2022 06:30)  T(F): 97.7 (03 Oct 2022 13:06), Max: 98.6 (03 Oct 2022 06:30)  HR: 60 (03 Oct 2022 13:06) (45 - 78)  BP: 147/79 (03 Oct 2022 13:06) (136/72 - 167/64)  BP(mean): --  RR: 16 (03 Oct 2022 13:06) (16 - 18)  SpO2: 100% (03 Oct 2022 13:06) (99% - 100%)    Parameters below as of 03 Oct 2022 13:06  Patient On (Oxygen Delivery Method): room air        PHYSICAL EXAM:    GENERAL: In no apparent distress, well nourished, and hydrated.  HEAD:  Atraumatic, Normocephalic  NECK: Supple . No JVD or carotid bruit or thyroidmegaly.  Carotid pulse is 2+ bilaterally.  PULMONARY: Clear to auscultation and perfusion.  No rales, wheezing, or rhonchi bilaterally.  HEART: Regular rate and rhythm; No murmurs, rubs, or gallops.  ABDOMEN: Soft, Nontender, Nondistended; Bowel sounds present  EXTREMITIES: No clubbing, cyanosis, or edema  NEUROLOGICAL: Alert oriented to person, place and time.  Speech clear.  Skin: Dry intact, no rashes or lesions.          INTERPRETATION OF TELEMETRY:  Sinus rhythm with occasional PVC's/APC's    ECG:         LABS:                        12.9   9.63  )-----------( 218      ( 03 Oct 2022 11:45 )             40.9     10-    136  |  99  |  28<H>  ----------------------------<  114<H>  4.0   |  25  |  1.39<H>    Ca    10.1      03 Oct 2022 11:45  Phos  3.1     10-  Mg     2.10     10-03    TPro  7.4  /  Alb  3.5  /  TBili  0.4  /  DBili  x   /  AST  30  /  ALT  21  /  AlkPhos  72  10-    CARDIAC MARKERS ( 02 Oct 2022 21:00 )  x     / x     / 142 U/L / x     / 3.2 ng/mL  Thyroid Stimulating Hormone, Serum: 1.73 uIU/mL (10.03.22 @ 07:00)          Urinalysis Basic - ( 02 Oct 2022 19:59 )    Color: Yellow / Appearance: Slightly Turbid / S.025 / pH: x  Gluc: x / Ketone: Negative  / Bili: Small / Urobili: <2 mg/dL   Blood: x / Protein: >600 mg/dL / Nitrite: Negative   Leuk Esterase: Negative / RBC: 6 /HPF / WBC 21 /HPF   Sq Epi: x / Non Sq Epi: >27 /HPF / Bacteria: Few        BNP  RADIOLOGY & ADDITIONAL STUDIES:        INTERPRETATION:  CLINICAL INFORMATION: Cough.    EXAM: 1 view of the chest.    COMPARISON: None.    FINDINGS:  Clear lungs. No pleural effusion or pneumothorax.  The heart is difficult to evaluate on this view.  No acute osseous findings.    IMPRESSION:  Clear lungs.    --- End of Report ---      FINDINGS:  LOWER CHEST: Clear    LIVER: Within normal limits.  BILE DUCTS: Common duct dilated up to 11 mm, tapers to 8 mm in the   pancreatic head.  GALLBLADDER: Within normal limits.  SPLEEN: Within normal limits.  PANCREAS: Pancreatic duct is dilated in the pancreatichead and tail 4   mm. No discrete pancreatic mass.  ADRENALS: Within normal limits.  KIDNEYS/URETERS: Absent right kidney. Left kidney is mildly hypertrophic.   No hydronephrosis. Incidental parapelvic cyst 1.3 cm cyst in the upper   pole, Bosniak 2F..    BLADDER: Decompressed, limiting evaluation  REPRODUCTIVE ORGANS: Hysterectomy.    BOWEL: No bowel obstruction. Diverticulosis coli.Normal appendix.  PERITONEUM: No ascites.  VESSELS: Atherosclerotic changes.  RETROPERITONEUM/LYMPH NODES: No lymphadenopathy.  ABDOMINAL WALL: Small fat-containing umbilical hernia.Diffuse anasarca.  BONES: Heterogeneous sclerosis in the right iliac wing and body.   Heterogeneous marrow density in the imaged thoracolumbar spine.    IMPRESSION:    1. No acute CT abnormality to explain the patient's abdominal tenderness.  2. 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  3. Heterogeneous marrow density, most prominently in the right iliac wing   and body. This is of uncertain etiology, probably related to an   infiltrative marrow process including neoplasm or underlying metabolic   disorder.    --- End of Report ---            PREVIOUS DIAGNOSTIC TESTING:      ECHO FINDINGS:  PROCEDURE: Transthoracic echocardiogram with 2-D, M-Mode  and complete spectral and color flow Doppler.  INDICATION: Abnormal electrocardiogram (ECG) (EKG) (R94.31)  ------------------------------------------------------------------------  DIMENSIONS:  Dimensions:     Normal Values:  LA:     3.3 cm    2.0 - 4.0 cm  Ao:     2.6 cm    2.0 - 3.8 cm  SEPTUM: 0.8 cm    0.6 - 1.2 cm  PWT:    0.8 cm    0.6 - 1.1 cm  LVIDd:  4.2 cm    3.0 - 5.6 cm  LVIDs:  2.6 cm    1.8 - 4.0 cm  Derived Variables:  LVMI: 55 g/m2  RWT: 0.38  Fractional short: 38 %  Ejection Fraction (Modified Patel Rule): 69 %  ------------------------------------------------------------------------  OBSERVATIONS:  Mitral Valve: Mitral annular calcification, otherwise  normal mitral valve. Minimal mitral regurgitation.  Aortic Root: Normal aortic root.  Aortic Valve: Aortic valve not well visualized.  Left Atrium: Normal left atrium.  Left Ventricle: Normal left ventricular systolic function.  No segmental wall motion abnormalities. Normal left  ventricular internal dimensions and wall thicknesses.  Right Heart: Normal right atrium. Normal right ventricular  size and function. Normal tricuspid valve. Mild-moderate  tricuspid regurgitation. Normal pulmonic valve. Minimal  pulmonic regurgitation.  Pericardium/PleuraNormal pericardium with no pericardial  effusion.  ------------------------------------------------------------------------  CONCLUSIONS:  1. Mitral annular calcification, otherwise normal mitral  valve. Minimal mitral regurgitation.  2. Normal left ventricular internal dimensions and wall  thicknesses.  3. Normal left ventricular systolic function. No segmental  wall motion abnormalities.  4. Normal right ventricular size and function.  ------------------------------------------------------------------------  Confirmed on  10/3/2022 - 12:08:45 by Sarthak Melo M.D.,  [facility]  ------------------------------------------------------------------------    STRESS FINDINGS:    CATHETERIZATION FINDINGS:

## 2022-10-03 NOTE — CONSULT NOTE ADULT - SUBJECTIVE AND OBJECTIVE BOX
Chief Complaint:  Patient is a 82y old  Female who presents with a chief complaint of Near collapse (03 Oct 2022 14:02)      HPI:  Ms. Interiano is an 81yo F with a PMH of HTN, HLD, mild dementia, R nephrectomy (as a child unknown etiology) who presents  after a fall. GI consulted for abnormal CT.    Patient reports intermittent right sided abdominal pain, unclear for how long, not related to meals but worse with defecation. No weight loss. No jaundice. No epigastric pain. Does not remember previous endoscopic evaluations.   Patient endorses ongoing weakness for the past few weeks, with associated fall the day of admission after using the bathroom. No melena, hematochezia. No nausea or vomiting. No change in stool color or consistency. No fever or chills.    ED course: VSS on arrival. Found to have UTI and was started on IV Ceftriaxone. RRT was called patient for AMS and sinus bradycardia that resolved. A TTE showed normal LVEF w/o valvular diseases.  Baseline EKG demonstrates NSR at 80 bpm.  HR trend range from 48 to 80's bpm on telemetry.  No AV blocks or myron <40 bpm seen so far. CT abdomen done with double duct sign with no appreciable pancreatic mass.      Allergies:  Lipitor (Rash)      Home Medications:    Hospital Medications:  acetaminophen     Tablet .. 650 milliGRAM(s) Oral every 6 hours PRN  amLODIPine   Tablet 5 milliGRAM(s) Oral daily  aspirin  chewable 81 milliGRAM(s) Oral daily  atorvastatin 40 milliGRAM(s) Oral at bedtime  cefTRIAXone   IVPB 1000 milliGRAM(s) IV Intermittent every 24 hours  donepezil 10 milliGRAM(s) Oral at bedtime  heparin   Injectable 5000 Unit(s) SubCutaneous every 12 hours  sodium chloride 0.9%. 1000 milliLiter(s) IV Continuous <Continuous>      PMHX/PSHX:  Dementia    HTN (hypertension)    HLD (hyperlipidemia)    H/O right nephrectomy        Family history:  FH: HTN (hypertension) (Father)        Denies family history of colon cancer/polyps, stomach cancer/polyps, pancreatic cancer/masses, liver cancer/disease, ovarian cancer and endometrial cancer.    Social History:     Tob: Denies  EtOH: As above  Illicit Drugs: Denies    ROS:   General:  No wt loss, fevers, chills, night sweats, fatigue  Eyes:  Good vision, no reported pain  ENT:  No sore throat, pain, runny nose, dysphagia  CV:  No pain, palpitations, hypo/hypertension  Pulm:  No dyspnea, cough, tachypnea, wheezing  GI:  As per HPI  :  No pain, bleeding, incontinence, nocturia  Muscle:  No pain, weakness  Neuro:  No weakness, tingling, memory problems  Psych:  No fatigue, insomnia, mood problems, depression  Endocrine:  No polyuria, polydipsia, cold/heat intolerance  Heme:  No petechiae, ecchymosis, easy bruisability  Skin:  No rash, tattoos, scars, edema    PHYSICAL EXAM:   GENERAL:  No acute distress  HEENT:  Normocephalic/atraumatic, no scleral icterus  CHEST:  No accessory muscle use  HEART:  Regular rate and rhythm  ABDOMEN:  Soft, non-tender, non-distended  EXTREMITIES: No cyanosis, clubbing, or edema  SKIN:  No rash  NEURO:  Alert and oriented x 3, no asterixis    Vital Signs:  Vital Signs Last 24 Hrs  T(C): 36.5 (03 Oct 2022 13:06), Max: 37 (03 Oct 2022 06:30)  T(F): 97.7 (03 Oct 2022 13:06), Max: 98.6 (03 Oct 2022 06:30)  HR: 60 (03 Oct 2022 13:06) (45 - 78)  BP: 147/79 (03 Oct 2022 13:06) (136/72 - 167/64)  BP(mean): --  RR: 16 (03 Oct 2022 13:06) (16 - 18)  SpO2: 100% (03 Oct 2022 13:06) (99% - 100%)    Parameters below as of 03 Oct 2022 13:06  Patient On (Oxygen Delivery Method): room air      Daily     Daily     LABS:                        12.9   9.63  )-----------( 218      ( 03 Oct 2022 11:45 )             40.9     Mean Cell Volume: 90.9 fL (10-22 @ 11:45)    10-03    136  |  99  |  28<H>  ----------------------------<  114<H>  4.0   |  25  |  1.39<H>    Ca    10.1      03 Oct 2022 11:45  Phos  3.1     10-03  Mg     2.10     10-    TPro  7.4  /  Alb  3.5  /  TBili  0.4  /  DBili  x   /  AST  30  /  ALT  21  /  AlkPhos  72  10-    LIVER FUNCTIONS - ( 03 Oct 2022 11:45 )  Alb: 3.5 g/dL / Pro: 7.4 g/dL / ALK PHOS: 72 U/L / ALT: 21 U/L / AST: 30 U/L / GGT: x             Urinalysis Basic - ( 02 Oct 2022 19:59 )    Color: Yellow / Appearance: Slightly Turbid / S.025 / pH: x  Gluc: x / Ketone: Negative  / Bili: Small / Urobili: <2 mg/dL   Blood: x / Protein: >600 mg/dL / Nitrite: Negative   Leuk Esterase: Negative / RBC: 6 /HPF / WBC 21 /HPF   Sq Epi: x / Non Sq Epi: >27 /HPF / Bacteria: Few      Amylase Serum--      Lipase serum32       Ammonia--                          12.9   9.63  )-----------( 218      ( 03 Oct 2022 11:45 )             40.9                         13.4   9.39  )-----------( 242      ( 03 Oct 2022 07:00 )             43.1                         15.0   12.34 )-----------( 278      ( 02 Oct 2022 18:56 )             48.6       Imaging:    ACC: [id number] EXAM:  CT ABDOMEN AND PELVIS IC                          PROCEDURE DATE:  10/02/2022          INTERPRETATION:  CLINICAL INFORMATION: Diffuse abdominal tenderness.    COMPARISON: None.    CONTRAST/COMPLICATIONS:  IV Contrast: [id number]  60 cc administered   5 cc discarded  Oral Contrast: NONE  Complications: None reported at time of study completion    PROCEDURE:  CT of the Abdomen and Pelvis was performed.  Sagittal and coronal reformats were performed.    FINDINGS:  LOWER CHEST: Clear    LIVER: Within normal limits.  BILE DUCTS: Common duct dilated up to 11 mm, tapers to 8 mm in the   pancreatic head.  GALLBLADDER: Within normal limits.  SPLEEN: Within normal limits.  PANCREAS: Pancreatic duct is dilated in the pancreatichead and tail 4   mm. No discrete pancreatic mass.  ADRENALS: Within normal limits.  KIDNEYS/URETERS: Absent right kidney. Left kidney is mildly hypertrophic.   No hydronephrosis. Incidental parapelvic cyst 1.3 cm cyst in the upper   pole, Bosniak 2F..    BLADDER: Decompressed, limiting evaluation  REPRODUCTIVE ORGANS: Hysterectomy.    BOWEL: No bowel obstruction. Diverticulosis coli.Normal appendix.  PERITONEUM: No ascites.  VESSELS: Atherosclerotic changes.  RETROPERITONEUM/LYMPH NODES: No lymphadenopathy.  ABDOMINAL WALL: Small fat-containing umbilical hernia.Diffuse anasarca.  BONES: Heterogeneous sclerosis in the right iliac wing and body.   Heterogeneous marrow density in the imaged thoracolumbar spine.    IMPRESSION:    1. No acute CT abnormality to explain the patient's abdominal tenderness.  2. Double duct sign without discrete pancreatic head mass or findings   could be related to ampullary stenosis or ampullary nodule, a   nonemergent/outpatient MRI follow-up can be obtained at the clinician's   discretion warranted.  3. Heterogeneous marrow density, most prominently in the right iliac wing   and body. This is of uncertain etiology, probably related to an   infiltrative marrow process including neoplasm or underlying metabolic   disorder.    --- End of Report ---

## 2022-10-03 NOTE — CONSULT NOTE ADULT - ASSESSMENT
83yo F with a PMH of HTN, HLD, mild dementia, R nephrectomy (as a child unknown etiology) who presents  after a fall. GI consulted for abnormal CT.    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 Unclear relation to right sided abdominal pain. DDx includes benign vs. malignant stricture. Would plan for MRCP to evaluate further with possible EUS/MRCP pending results.   # HTN  # R nephrectomy  # UTI    Recommendations:  - MRCP  - Abx per primary team  - Diet as tolerated  - Pending MRCP possible EUS    Please note that the recommendations are not final until attested by an attending.    Thank you for involving us in the care of this patient. Please reach out if any further questions.    Ivan Haynes, PGY-6  Gastroenterology/Hepatology Fellow    Available on [employer] Teams  After 5PM/Weekends, please contact the on-call GI fellow: [phone]     83yo F with a PMH of HTN, HLD, mild dementia, R nephrectomy (as a child unknown etiology) who presents  after a fall. GI consulted for abnormal CT.    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 Unclear relation to right sided abdominal pain. DDx includes benign vs. malignant stricture. Would plan for MRCP to evaluate further with possible EUS/MRCP pending results.   # Syncope - unclear etiology, workup in progress  # HTN  # R nephrectomy  # UTI    Recommendations:  - MRCP  - Abx per primary team  - Syncope/pre-syncope workup per primary team  - Diet as tolerated  - Pending MRCP possible EUS once patient's syncope had been addressed    Please note that the recommendations are not final until attested by an attending.    Thank you for involving us in the care of this patient. Please reach out if any further questions.    Ivan Haynes, PGY-6  Gastroenterology/Hepatology Fellow    Available on [employer] Teams  After 5PM/Weekends, please contact the on-call GI fellow: [phone]

## 2022-10-03 NOTE — H&P ADULT - PROBLEM SELECTOR PLAN 2
-bradycardic while sleeping to 50s, goes to 80s while awake  -can monitor on tele to assure no pauses   -Q waves seen anterior leads obtain TTE  -troponin elevation very mild, pt w/o chest pain, likely elevation 2/2 FORD but add on ckmb and creatinine kinase

## 2022-10-03 NOTE — PHARMACOTHERAPY INTERVENTION NOTE - COMMENTS
Medication history is complete. Medication list updated in Outpatient Medication Record ([facility]). Please call spectra [phone] if you have any questions.   source: pharmacy and patient's family  Spoke with ACP provider to notify [facility] updated.

## 2022-10-03 NOTE — PROGRESS NOTE ADULT - PROBLEM SELECTOR PLAN 5
-as above, concern MDS given CT findings  -Hg and platelets wnl  -may need outpt heme eval based on labwork findings  - b12 1149, folate 10.5 -as above, concern MDS given CT findings  -Hg and platelets wnl  -may need outpt heme eval based on labwork findings  - b12 1149, folate 10.5  -check spot urine prot/cr ratio, free light chains

## 2022-10-03 NOTE — H&P ADULT - PROBLEM SELECTOR PLAN 1
-generalized weakness likely 2/2 uti below, back towards baseline currently  -no focal deficits, no trauma, CT pelvis w/o fracture

## 2022-10-03 NOTE — PROGRESS NOTE ADULT - SUBJECTIVE AND OBJECTIVE BOX
Dr. Cami Chang  Pager [phone]    PROGRESS NOTE:     Patient is a 82y old  Female who presents with a chief complaint of Near collapse (03 Oct 2022 01:57)      SUBJECTIVE / OVERNIGHT EVENTS: denies chest pain or sob , daughter and granddaughter at bedside, c/o right sided abd pain, lethargy on  and slept all day and didn't eat per daughter, then had a near syncopal episode, no headstrike  ADDITIONAL REVIEW OF SYSTEMS: afebrile     MEDICATIONS  (STANDING):  cefTRIAXone   IVPB 1000 milliGRAM(s) IV Intermittent every 24 hours  heparin   Injectable 5000 Unit(s) SubCutaneous every 12 hours    MEDICATIONS  (PRN):  acetaminophen     Tablet .. 650 milliGRAM(s) Oral every 6 hours PRN Temp greater or equal to 38C (100.4F), Mild Pain (1 - 3)      CAPILLARY BLOOD GLUCOSE      POCT Blood Glucose.: 154 mg/dL (02 Oct 2022 17:53)    I&O's Summary      PHYSICAL EXAM:  Vital Signs Last 24 Hrs  T(C): 37 (03 Oct 2022 09:15), Max: 37 (03 Oct 2022 06:30)  T(F): 98.6 (03 Oct 2022 09:15), Max: 98.6 (03 Oct 2022 06:30)  HR: 60 (03 Oct 2022 09:15) (45 - 78)  BP: 143/64 (03 Oct 2022 09:15) (136/72 - 167/64)  BP(mean): --  RR: 17 (03 Oct 2022 09:15) (16 - 18)  SpO2: 100% (03 Oct 2022 09:15) (99% - 100%)    Parameters below as of 03 Oct 2022 09:15  Patient On (Oxygen Delivery Method): room air      CONSTITUTIONAL: NAD, well-developed  RESPIRATORY: Normal respiratory effort; lungs are clear to auscultation bilaterally  CARDIOVASCULAR: Regular rate and rhythm, normal S1 and S2, no murmur/rub/gallop; No lower extremity edema; Peripheral pulses are 2+ bilaterally  ABDOMEN: RUQ ttp, soft, normoactive bowel sounds, no rebound/guarding; No hepatosplenomegaly  MUSCULOSKELETAL: no clubbing or cyanosis of digits; no joint swelling or tenderness to palpation  PSYCH: A+O to person, place, affect appropriate  Neuro: has dementia , nonfocal, CNII-XII grossly intact     LABS:                        13.4   9.39  )-----------( 242      ( 03 Oct 2022 07:00 )             43.1     10-03    138  |  101  |  27<H>  ----------------------------<  96  4.2   |  25  |  1.42<H>    Ca    10.1      03 Oct 2022 07:00  Phos  3.4     10-03  Mg     1.90     10-    TPro  7.5  /  Alb  3.5  /  TBili  0.5  /  DBili  x   /  AST  29  /  ALT  18  /  AlkPhos  79  10-03      CARDIAC MARKERS ( 02 Oct 2022 21:00 )  x     / x     / 142 U/L / x     / 3.2 ng/mL      Urinalysis Basic - ( 02 Oct 2022 19:59 )    Color: Yellow / Appearance: Slightly Turbid / S.025 / pH: x  Gluc: x / Ketone: Negative  / Bili: Small / Urobili: <2 mg/dL   Blood: x / Protein: >600 mg/dL / Nitrite: Negative   Leuk Esterase: Negative / RBC: 6 /HPF / WBC 21 /HPF   Sq Epi: x / Non Sq Epi: >27 /HPF / Bacteria: Few      RADIOLOGY & ADDITIONAL TESTS:  Results Reviewed:   Imaging Personally Reviewed:  < from: CT Abdomen and Pelvis w/ IV Cont (10.02.22 @ 21:15) >  LOWER CHEST: Clear    LIVER: Within normal limits.  BILE DUCTS: Common duct dilated up to 11 mm, tapers to 8 mm in the   pancreatic head.  GALLBLADDER: Within normal limits.  SPLEEN: Within normal limits.  PANCREAS: Pancreatic duct is dilated in the pancreatichead and tail 4   mm. No discrete pancreatic mass.  ADRENALS: Within normal limits.  KIDNEYS/URETERS: Absent right kidney. Left kidney is mildly hypertrophic.   No hydronephrosis. Incidental parapelvic cyst 1.3 cm cyst in the upper   pole, Bosniak 2F..    BLADDER: Decompressed, limiting evaluation  REPRODUCTIVE ORGANS: Hysterectomy.    BOWEL: No bowel obstruction. Diverticulosis coli.Normal appendix.  PERITONEUM: No ascites.  VESSELS: Atherosclerotic changes.  RETROPERITONEUM/LYMPH NODES: No lymphadenopathy.  ABDOMINAL WALL: Small fat-containing umbilical hernia.Diffuse anasarca.  BONES: Heterogeneous sclerosis in the right iliac wing and body.   Heterogeneous marrow density in the imaged thoracolumbar spine.    IMPRESSION:    1. No acute CT abnormality to explain the patient's abdominal tenderness.  2. 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  3. Heterogeneous marrow density, most prominently in the right iliac wing   and body. This is of uncertain etiology, probably related to an   infiltrative marrow process including neoplasm or underlying metabolic   disorder.    Electrocardiogram Personally Reviewed:    COORDINATION OF CARE:  Care Discussed with Consultants/Other Providers [Y/N]: acp Ali, GI consult for pancreatic ductal dilatation  and abd pain  Prior or Outpatient Records Reviewed [Y/N]:   Dr. Cami Chang  Pager [phone]    PROGRESS NOTE:     Patient is a 82y old  Female who presents with a chief complaint of Near collapse (03 Oct 2022 01:57)      SUBJECTIVE / OVERNIGHT EVENTS: denies chest pain or sob , daughter and granddaughter at bedside, c/o right sided abd pain, lethargy on  and slept all day and didn't eat per daughter, then had a near syncopal episode, no headstrike  ADDITIONAL REVIEW OF SYSTEMS: afebrile     MEDICATIONS  (STANDING):  cefTRIAXone   IVPB 1000 milliGRAM(s) IV Intermittent every 24 hours  heparin   Injectable 5000 Unit(s) SubCutaneous every 12 hours    MEDICATIONS  (PRN):  acetaminophen     Tablet .. 650 milliGRAM(s) Oral every 6 hours PRN Temp greater or equal to 38C (100.4F), Mild Pain (1 - 3)      CAPILLARY BLOOD GLUCOSE      POCT Blood Glucose.: 154 mg/dL (02 Oct 2022 17:53)    I&O's Summary      PHYSICAL EXAM:  Vital Signs Last 24 Hrs  T(C): 37 (03 Oct 2022 09:15), Max: 37 (03 Oct 2022 06:30)  T(F): 98.6 (03 Oct 2022 09:15), Max: 98.6 (03 Oct 2022 06:30)  HR: 60 (03 Oct 2022 09:15) (45 - 78)  BP: 143/64 (03 Oct 2022 09:15) (136/72 - 167/64)  BP(mean): --  RR: 17 (03 Oct 2022 09:15) (16 - 18)  SpO2: 100% (03 Oct 2022 09:15) (99% - 100%)    Parameters below as of 03 Oct 2022 09:15  Patient On (Oxygen Delivery Method): room air      CONSTITUTIONAL: NAD, well-developed  RESPIRATORY: Normal respiratory effort; lungs are clear to auscultation bilaterally  CARDIOVASCULAR: Regular rate and rhythm, normal S1 and S2, no murmur/rub/gallop; No lower extremity edema; Peripheral pulses are 2+ bilaterally  ABDOMEN: RUQ ttp, soft, normoactive bowel sounds, no rebound/guarding; No hepatosplenomegaly  MUSCULOSKELETAL: no clubbing or cyanosis of digits; no joint swelling or tenderness to palpation  PSYCH: A+O to person, place, affect appropriate  Neuro: has dementia , nonfocal, CNII-XII grossly intact     LABS:                        13.4   9.39  )-----------( 242      ( 03 Oct 2022 07:00 )             43.1     10-03    138  |  101  |  27<H>  ----------------------------<  96  4.2   |  25  |  1.42<H>    Ca    10.1      03 Oct 2022 07:00  Phos  3.4     10-03  Mg     1.90     10-    TPro  7.5  /  Alb  3.5  /  TBili  0.5  /  DBili  x   /  AST  29  /  ALT  18  /  AlkPhos  79  10-03      CARDIAC MARKERS ( 02 Oct 2022 21:00 )  x     / x     / 142 U/L / x     / 3.2 ng/mL      Urinalysis Basic - ( 02 Oct 2022 19:59 )    Color: Yellow / Appearance: Slightly Turbid / S.025 / pH: x  Gluc: x / Ketone: Negative  / Bili: Small / Urobili: <2 mg/dL   Blood: x / Protein: >600 mg/dL / Nitrite: Negative   Leuk Esterase: Negative / RBC: 6 /HPF / WBC 21 /HPF   Sq Epi: x / Non Sq Epi: >27 /HPF / Bacteria: Few      RADIOLOGY & ADDITIONAL TESTS:  Results Reviewed:   Imaging Personally Reviewed:  < from: CT Abdomen and Pelvis w/ IV Cont (10.02.22 @ 21:15) >  LOWER CHEST: Clear    LIVER: Within normal limits.  BILE DUCTS: Common duct dilated up to 11 mm, tapers to 8 mm in the   pancreatic head.  GALLBLADDER: Within normal limits.  SPLEEN: Within normal limits.  PANCREAS: Pancreatic duct is dilated in the pancreatichead and tail 4   mm. No discrete pancreatic mass.  ADRENALS: Within normal limits.  KIDNEYS/URETERS: Absent right kidney. Left kidney is mildly hypertrophic.   No hydronephrosis. Incidental parapelvic cyst 1.3 cm cyst in the upper   pole, Bosniak 2F..    BLADDER: Decompressed, limiting evaluation  REPRODUCTIVE ORGANS: Hysterectomy.    BOWEL: No bowel obstruction. Diverticulosis coli.Normal appendix.  PERITONEUM: No ascites.  VESSELS: Atherosclerotic changes.  RETROPERITONEUM/LYMPH NODES: No lymphadenopathy.  ABDOMINAL WALL: Small fat-containing umbilical hernia.Diffuse anasarca.  BONES: Heterogeneous sclerosis in the right iliac wing and body.   Heterogeneous marrow density in the imaged thoracolumbar spine.    IMPRESSION:    1. No acute CT abnormality to explain the patient's abdominal tenderness.  2. 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  3. Heterogeneous marrow density, most prominently in the right iliac wing   and body. This is of uncertain etiology, probably related to an   infiltrative marrow process including neoplasm or underlying metabolic   disorder.    e< from: Transthoracic Echocardiogram (10.03.22 @ 10:48) >  CONCLUSIONS:  LVEF 69%  1. Mitral annular calcification, otherwise normal mitral  valve. Minimal mitral regurgitation.  2. Normal left ventricular internal dimensions and wall  thicknesses.  3. Normal left ventricular systolic function. No segmental  wall motion abnormalities.  4. Normal right ventricular size and function.    Electrocardiogram Personally Reviewed:    COORDINATION OF CARE:  Care Discussed with Consultants/Other Providers [Y/N]: acp Ali, GI consult for pancreatic ductal dilatation  and abd pain  Prior or Outpatient Records Reviewed [Y/N]:

## 2022-10-03 NOTE — H&P ADULT - ASSESSMENT
Patient is an 82 year old F hx of dementia, HTN, HLD, r. nephrectomy as a child who presents for generalized weakness and falling due to weakness (but daughter caught her).

## 2022-10-03 NOTE — RAPID RESPONSE TEAM SUMMARY - NSADDTLFINDINGSRRT_GEN_ALL_CORE
RECOMMENDATIONS to primary team:  [] keep patient on telemetry with pads on and atropine at bedside  [] f/u TTE  [] f/u EP recs  [] f/u labs: CBC, CMP, trop, VBG.  RECOMMENDATIONS to primary team:  [] keep patient on telemetry with pads on and atropine at bedside  [] f/u TTE  [] f/u EP recs  [] f/u labs: CBC, CMP, trop, VBG. Can add-on BNP

## 2022-10-03 NOTE — PROVIDER CONTACT NOTE (OTHER) - ACTION/TREATMENT ORDERED:
Provider Aware. Per Provider, continue to monitor and recheck blood pressure in 1 hour.
Provider Aware. Per Provider, recheck vital signs in one hour and continue to monitor.

## 2022-10-03 NOTE — PROGRESS NOTE ADULT - PROBLEM SELECTOR PLAN 8
clarified with family, takes losartan 50mg qd at home clarified with family, takes losartan 50mg qd at home  hold arb for now

## 2022-10-03 NOTE — PATIENT PROFILE ADULT - FOOD INSECURITY
no No cyanosis, no pallor, no jaundice, no rash No cyanosis, no pallor, no jaundice, no rash. Abrasion and tenderness right temporal area. Multiple linear abrasions to right upper and lower arm

## 2022-10-03 NOTE — PROGRESS NOTE ADULT - PROBLEM SELECTOR PLAN 2
-bradycardic while sleeping to 50s, goes to 80s while awake  -can monitor on tele to assure no pauses   -Q waves seen anterior leads obtain TTE, daughter reports hx prior MI  -troponin elevation very mild, pt w/o chest pain, likely elevation 2/2 FORD   nml cpk, troponin downtrending 80-->61  expedited TTE  -pt had a RRT today for brief period of unresponsiveness, bradycardic, will call EP consult -bradycardic while sleeping to 50s, goes to 80s while awake  -tele monitor   -Q waves seen anterior leads obtain TTE, daughter reports hx prior MI  -troponin elevation very mild, pt w/o chest pain, likely elevation 2/2 FORD   nml cpk, troponin downtrending 80-->61  expedited TTE  -pt had a RRT today for brief period of unresponsiveness, bradycardic, will call EP consult -bradycardic while sleeping to 50s, goes to 80s while awake  -tele monitor   -Q waves seen anterior leads, daughter reports hx prior MI  -troponin elevation very mild, pt w/o chest pain, likely elevation 2/2 FORD   nml cpk, troponin downtrending 80-->61  - TTE (10/3) Normal LV/RV function, EF 69%, No RWMA  -pt had a RRT today for brief period of unresponsiveness, bradycardic, will call EP consult

## 2022-10-03 NOTE — CHART NOTE - NSCHARTNOTEFT_GEN_A_CORE
Late Entry:    RRT called for brief period of unresponsiveness while in echo. Pt with bradycardia to 48 at time, with HR range 48-60. Pt with no known history of bradycardia. Upon bedside arrival, pt was lethargic, arousable to verbal stimuli, oriented x 2-3, MAEx4, denies chest pain, c/o abd pain, skin warm, dry. RRT team at bedside immediately, refer to RRT note for further details. Repeat EKG shows sinus myron. EP consult called for symptomatic bradycardia. Pacer pads placed, atropine kept at bedside. Pt will remain on tele monitoring on this admission. Pt now back to baseline mental status, family at bedside and updated to plan of care. Will continue to monitor pt closely and follow up EP recommendations

## 2022-10-03 NOTE — H&P ADULT - NSHPLABSRESULTS_GEN_ALL_CORE
LABS:                         15.0   12.34 )-----------( 278      ( 02 Oct 2022 18:56 )             48.6     10    141  |  99  |  20  ----------------------------<  150<H>  5.2   |  29  |  1.51<H>    Ca    11.1<H>      02 Oct 2022 18:56    TPro  8.7<H>  /  Alb  4.3  /  TBili  0.5  /  DBili  x   /  AST  35<H>  /  ALT  31  /  AlkPhos  97  10-02      Urinalysis Basic - ( 02 Oct 2022 19:59 )    Color: Yellow / Appearance: Slightly Turbid / S.025 / pH: x  Gluc: x / Ketone: Negative  / Bili: Small / Urobili: <2 mg/dL   Blood: x / Protein: >600 mg/dL / Nitrite: Negative   Leuk Esterase: Negative / RBC: 6 /HPF / WBC 21 /HPF   Sq Epi: x / Non Sq Epi: >27 /HPF / Bacteria: Few      RADIOLOGY, EKG & ADDITIONAL TESTS: Reviewed.

## 2022-10-03 NOTE — CONSULT NOTE ADULT - NS ATTEND AMEND GEN_ALL_CORE FT
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 infection in September (treated with 1 week ABx by PCP) who presents to ED s/p fall with generalized weakness yesterday. Patient endorses moderate persistent RLQ pain. Patient was found to have UTI and was started on IV Ceftriaxone.  Suspect patient probably had vasovagal response at time of RRT noticed in sinus myron down in low 40's and in the setting of abdominal pain.   Baseline EKG demonstrates NSR at 80 bpm.  HR trend range from 48 to 80's bpm on telemetry. Avoid AVN blockers- likely vagal mediated. Will follow.

## 2022-10-03 NOTE — CONSULT NOTE ADULT - ASSESSMENT
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 infection in September (treated with 1 week ABx by PCP) who presents to ED s/p fall with generalized weakness yesterday. Patient endorses moderate persistent RLQ pain. Patient was found to have UTI and was started on IV Ceftriaxone.  suspect patient likely had a vasovagal response earlier with sinus myron down to low 40's in the setting of abdominal pain.   Baseline EKG demonstrates NSR at 80 bpm.  HR trend range from 48 to 80's bpm on telemetry.    A TTE showed normal LVEF w/o valvular diseases.   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 infection in September (treated with 1 week ABx by PCP) who presents to ED s/p fall with generalized weakness yesterday. Patient endorses moderate persistent RLQ pain. Patient was found to have UTI and was started on IV Ceftriaxone.  Suspect patient probably had vasovagal response at time of RRT noticed in sinus myron down in low 40's and in the setting of abdominal pain.   Baseline EKG demonstrates NSR at 80 bpm.  HR trend range from 48 to 80's bpm on telemetry.    A TTE showed normal LVEF w/o valvular diseases.   -Closely monitor correlated symptomatic with sinus myron with HR at 40's bpm on telemetry, low suspicion for chronotropic incompetence   -Avoid AV loni agents, keep Zoll pads and Atropine at bedside  -Consider holding off Donepezil given the myron evidence  -No acute pacing, will follow up

## 2022-10-03 NOTE — H&P ADULT - HISTORY OF PRESENT ILLNESS
Patient is an 82 year old F hx of dementia, HTN, HLD, r. nephrectomy as a child who presents for generalized weakness and falling due to weakness (but daughter caught her). She has been in good health prior, and is usually able to ambulate without issues. She is a/o times 2-3 but forgetful w/ dementia. Her strength is back to baseline now.     EKG: NSR w/ anterior lead Q waves, HI and Qtc intervals wnl. No acute ST elevations.  u/a contaminated but w/ proteinuria, leukocytosis noted  creatinine of 1.5   HR 55-80s

## 2022-10-03 NOTE — PROGRESS NOTE ADULT - PROBLEM SELECTOR PLAN 3
-w/ proteinuria noted, also noted bone marrow infiltration on CT a/p iv contrast, d/w daughter that this could possibly be on spectrum of MDS  -obtain SPEP/UPEP  -repeat u/a w/ microscopic reflex once able since prior was contaminated  -s/p 1 L fluids reassess in am creatinine level   -has hx of right nephrectomy so monitor urine closely -w/ proteinuria noted, also noted bone marrow infiltration on CT a/p iv contrast, d/w daughter that this could possibly be on spectrum of MDS  -obtain SPEP/UPEP  -repeat u/a w/ microscopic reflex once able since prior was contaminated  -s/p 1 L fluids reassess in am creatinine level   -has hx of right nephrectomy so monitor urine closely, no hydro on CT, left kidney mild  hypertrophy, L renal cyst

## 2022-10-03 NOTE — PROGRESS NOTE ADULT - PROBLEM SELECTOR PLAN 1
-generalized weakness likely 2/2 possible uti, dehydration, R abd pain  -no focal deficits, no trauma, CT pelvis w/o fracture  - pt has right abdominal pain, CT showed dilated pancreatic duct without pancreatic mass --> GI consult to r/o ampullary stenosis/nodule -generalized weakness likely 2/2 possible uti, dehydration, R abd pain  -no focal deficits, no trauma, CT pelvis w/o fracture  - pt has right abdominal pain, CT showed dilated pancreatic duct without pancreatic mass --> GI consult to r/o ampullary stenosis/nodule  -acute encephalopathy, check CT head , hx of dementia

## 2022-10-03 NOTE — RAPID RESPONSE TEAM SUMMARY - NSSITUATIONBACKGROUNDRRT_GEN_ALL_CORE
82 year old F hx of dementia, HTN, HLD, r. nephrectomy as a child who was admitted for generalized weakness and falling due to weakness found to have UTI and bradycardia to 50s on admission. Of note, patient has dementia with baseline waxing/waning mental status Ax02-3. RRT called for unresponsiveness while undergoing echo. On my arrival patient awake, with /64, HR 45, RR 18, T 97.4 Sp02 100, . Patient AxOx3 although lethargic. Denies headache, dizziness. Prior labs reviewed, without significant electrolyte abnormalities but with Cr 1.42 82 year old F hx of dementia, HTN, HLD, r. nephrectomy as a child who was admitted for generalized weakness and falling due to weakness found to have UTI and bradycardia to 50s on admission. Of note, patient has dementia with baseline waxing/waning mental status Ax02-3. RRT called for unresponsiveness while undergoing echo. On my arrival patient awake, with /64, HR 45, RR 18, T 97.4 Sp02 100, . Patient AxOx3 although lethargic. Denies headache, dizziness. Prior labs reviewed, without significant electrolyte abnormalities but with FORD and downtrending troponins. Did not beta blocking medications recently.  EKG done with sinus bradycardia. CMP, CBC, troponin and VBG drawn. Patient placed on zoll with pads and atropine ready at bedside. Given patient no longer symptomatic and with appropriate pressures atropine not given. Primary team at bedside and patient placed on telemetry and EP called. RRT ended. Primary team, and ED nurse updated on plan with patient to remain on telemetry.     At this time, my differential for the unresponsiveness includes symptomatic bradycardia vs probable positional/vasovagal syncope.  82 year old F hx of dementia, HTN, HLD, r. nephrectomy as a child who was admitted for generalized weakness and falling due to weakness found to have UTI and bradycardia to 50s on admission. Of note, patient has dementia with baseline waxing/waning mental status Ax02-3. RRT called for unresponsiveness while undergoing echo. On my arrival patient awake, with /64, HR 45, RR 18, T 97.4 Sp02 100, . Patient AxOx3 although lethargic. Denies headache, dizziness. Prior labs reviewed, without significant electrolyte abnormalities but with FORD and downtrending troponins. Did not beta blocking medications recently.  EKG done with sinus bradycardia. CMP, CBC, troponin and VBG drawn. Patient placed on zoll with pads and atropine ready at bedside. Given patient no longer symptomatic and with appropriate pressures atropine not given. Primary team at bedside and patient placed on telemetry and EP called. Of note, patient presented to echo lab from ED holding while awaiting medicine bed.  RRT ended. Primary team, and ED nurse updated on plan with patient to remain on telemetry.     At this time, my differential for the unresponsiveness includes symptomatic bradycardia vs probable positional/vasovagal syncope.  82 year old F hx of dementia, HTN, HLD, r. nephrectomy as a child who was admitted for generalized weakness and falling due to weakness found to have UTI and bradycardia to 50s on admission. Of note, patient has dementia with baseline waxing/waning mental status Ax02-3. RRT called for unresponsiveness while undergoing echo. On my arrival patient awake, with /64, HR 45, RR 18, T 97.4 Sp02 100, . Patient AxOx3 although lethargic. Denies headache, dizziness. Prior labs reviewed, without significant electrolyte abnormalities but with FORD and downtrending troponins. Did not receive beta blocking medications recently.  EKG done with sinus bradycardia. CMP, CBC, troponin and VBG drawn. Patient placed on zoll with pads and atropine ready at bedside. Given patient no longer symptomatic and with appropriate pressures atropine not given. Primary team at bedside and patient placed on telemetry and EP called. Of note, patient presented to echo lab from ED holding while awaiting medicine bed.  RRT ended. Primary team, and ED nurse updated on plan with patient to remain on telemetry.     At this time, my differential for the unresponsiveness includes symptomatic bradycardia vs probable positional/vasovagal syncope.  82 year old F hx of dementia, HTN, HLD, r. nephrectomy as a child who was admitted for generalized weakness and falling due to weakness found to have UTI and bradycardia to 50s on admission. Of note, patient has dementia with baseline waxing/waning mental status Ax02-3. RRT called for unresponsiveness while undergoing echo. On my arrival patient awake, with /64, HR 45, RR 18, T 97.4 Sp02 100, . Patient AxOx3 although lethargic. Denies headache, dizziness. Prior labs reviewed, without significant electrolyte abnormalities but with FORD and downtrending troponins. Did not receive beta blocking medications recently.  EKG done with sinus bradycardia. CMP, CBC, troponin and VBG drawn. Patient placed on zoll with pads and atropine ready at bedside. Given patient no longer symptomatic and with appropriate pressures atropine held at this time. Primary team at bedside and patient placed on telemetry and EP called. Of note, patient presented to echo lab from ED holding while awaiting medicine bed.  RRT ended. Primary team, and ED nurse updated on plan with patient to remain on telemetry.     At this time, my differential for the unresponsiveness includes symptomatic bradycardia vs probable positional/vasovagal syncope.  82 year old F hx of dementia, HTN, HLD, r. nephrectomy as a child who was admitted for generalized weakness and falling due to weakness found to have UTI and bradycardia to 50s on admission. Of note, patient has dementia with baseline waxing/waning mental status Ax02-3. RRT called for unresponsiveness while undergoing echo. On my arrival patient awake, with /64, HR 45, RR 18, T 97.4 Sp02 100, . Patient AxOx3 although lethargic. Denies headache, dizziness. Prior labs reviewed, without significant electrolyte abnormalities but with FORD and downtrending troponins. Did not receive beta blocking medications recently.  EKG done with sinus bradycardia without acute ischemic changes on my read. CMP, CBC, troponin and VBG drawn. Patient placed on zoll with pads and atropine ready at bedside. Given patient no longer symptomatic and with appropriate pressures atropine held at this time. Primary team at bedside and patient placed on telemetry and EP called. Of note, patient presented to echo lab from ED holding while awaiting medicine bed.  RRT ended. Primary team, and ED nurse updated on plan with patient to remain on telemetry.     At this time, my differential for the unresponsiveness includes symptomatic bradycardia (new HF? although clinically did not appear ) vs probable positional/vasovagal syncope while undergoing echo

## 2022-10-03 NOTE — H&P ADULT - PROBLEM SELECTOR PLAN 3
-w/ proteinuria noted, also noted bone marrow infiltration on CT a/p iv contrast, d/w daughter that this could possibly be on spectrum of MDS  -obtain SPEP/UPEP, and 24 hour protein (no edema noted on LE though lower concern for nephrotic syndrome)  -repeat u/a w/ microscopic reflex once able since prior was contaminated  -s/p 1 L fluids reassess in am creatinine level   -has hx of right nephrectomy so monitor urine closely

## 2022-10-04 LAB
% ALBUMIN: 38.4 % — SIGNIFICANT CHANGE UP
% ALPHA 1: 4 % — SIGNIFICANT CHANGE UP
% ALPHA 2: 16 % — SIGNIFICANT CHANGE UP
% BETA: 16.4 % — SIGNIFICANT CHANGE UP
% GAMMA: 25.1 % — SIGNIFICANT CHANGE UP
ALBUMIN SERPL ELPH-MCNC: 2.88 G/DL — LOW (ref 3.3–4.4)
ALBUMIN/GLOB SERPL ELPH: 0.6 RATIO — SIGNIFICANT CHANGE UP
ALPHA1 GLOB SERPL ELPH-MCNC: 0.3 G/DL — SIGNIFICANT CHANGE UP (ref 0.1–0.3)
ALPHA2 GLOB SERPL ELPH-MCNC: 1.2 G/DL — HIGH (ref 0.6–1)
ANION GAP SERPL CALC-SCNC: 8 MMOL/L — SIGNIFICANT CHANGE UP (ref 7–14)
B-GLOBULIN SERPL ELPH-MCNC: 1.23 G/DL — HIGH (ref 0.6–1.1)
BUN SERPL-MCNC: 20 MG/DL — SIGNIFICANT CHANGE UP (ref 7–23)
CALCIUM SERPL-MCNC: 10 MG/DL — SIGNIFICANT CHANGE UP (ref 8.4–10.5)
CHLORIDE SERPL-SCNC: 105 MMOL/L — SIGNIFICANT CHANGE UP (ref 98–107)
CO2 SERPL-SCNC: 29 MMOL/L — SIGNIFICANT CHANGE UP (ref 22–31)
CREAT SERPL-MCNC: 0.99 MG/DL — SIGNIFICANT CHANGE UP (ref 0.5–1.3)
CULTURE RESULTS: NO GROWTH — SIGNIFICANT CHANGE UP
EGFR: 57 ML/MIN/1.73M2 — LOW
GAMMA GLOBULIN: 1.88 G/DL — HIGH (ref 0.7–1.7)
GLUCOSE SERPL-MCNC: 85 MG/DL — SIGNIFICANT CHANGE UP (ref 70–99)
HCT VFR BLD CALC: 38.9 % — SIGNIFICANT CHANGE UP (ref 34.5–45)
HGB BLD-MCNC: 12 G/DL — SIGNIFICANT CHANGE UP (ref 11.5–15.5)
KAPPA LC SER QL IFE: 3.35 MG/DL — HIGH (ref 0.33–1.94)
KAPPA/LAMBDA FREE LIGHT CHAIN RATIO, SERUM: 1.5 RATIO — SIGNIFICANT CHANGE UP (ref 0.26–1.65)
LAMBDA LC SER QL IFE: 2.24 MG/DL — SIGNIFICANT CHANGE UP (ref 0.57–2.63)
MAGNESIUM SERPL-MCNC: 1.9 MG/DL — SIGNIFICANT CHANGE UP (ref 1.6–2.6)
MCHC RBC-ENTMCNC: 28.4 PG — SIGNIFICANT CHANGE UP (ref 27–34)
MCHC RBC-ENTMCNC: 30.8 GM/DL — LOW (ref 32–36)
MCV RBC AUTO: 92 FL — SIGNIFICANT CHANGE UP (ref 80–100)
NRBC # BLD: 0 /100 WBCS — SIGNIFICANT CHANGE UP (ref 0–0)
NRBC # FLD: 0 K/UL — SIGNIFICANT CHANGE UP (ref 0–0)
PHOSPHATE SERPL-MCNC: 2.7 MG/DL — SIGNIFICANT CHANGE UP (ref 2.5–4.5)
PLATELET # BLD AUTO: 218 K/UL — SIGNIFICANT CHANGE UP (ref 150–400)
POTASSIUM SERPL-MCNC: 3.9 MMOL/L — SIGNIFICANT CHANGE UP (ref 3.5–5.3)
POTASSIUM SERPL-SCNC: 3.9 MMOL/L — SIGNIFICANT CHANGE UP (ref 3.5–5.3)
PROT PATTERN SERPL ELPH-IMP: SIGNIFICANT CHANGE UP
PROT SERPL-MCNC: 7.5 G/DL — SIGNIFICANT CHANGE UP
RBC # BLD: 4.23 M/UL — SIGNIFICANT CHANGE UP (ref 3.8–5.2)
RBC # FLD: 13.5 % — SIGNIFICANT CHANGE UP (ref 10.3–14.5)
SODIUM SERPL-SCNC: 142 MMOL/L — SIGNIFICANT CHANGE UP (ref 135–145)
SPECIMEN SOURCE: SIGNIFICANT CHANGE UP
WBC # BLD: 6.52 K/UL — SIGNIFICANT CHANGE UP (ref 3.8–10.5)
WBC # FLD AUTO: 6.52 K/UL — SIGNIFICANT CHANGE UP (ref 3.8–10.5)

## 2022-10-04 PROCEDURE — 99233 SBSQ HOSP IP/OBS HIGH 50: CPT

## 2022-10-04 PROCEDURE — 70450 CT HEAD/BRAIN W/O DYE: CPT | Mod: 26

## 2022-10-04 PROCEDURE — 99232 SBSQ HOSP IP/OBS MODERATE 35: CPT

## 2022-10-04 RX ORDER — BRIMONIDINE TARTRATE 2 MG/MG
1 SOLUTION/ DROPS OPHTHALMIC
Refills: 0 | Status: DISCONTINUED | OUTPATIENT
Start: 2022-10-04 | End: 2022-10-07

## 2022-10-04 RX ORDER — CYCLOSPORINE 0.5 MG/ML
1 EMULSION OPHTHALMIC
Refills: 0 | Status: DISCONTINUED | OUTPATIENT
Start: 2022-10-04 | End: 2022-10-07

## 2022-10-04 RX ORDER — DORZOLAMIDE HYDROCHLORIDE 20 MG/ML
1 SOLUTION/ DROPS OPHTHALMIC
Refills: 0 | Status: DISCONTINUED | OUTPATIENT
Start: 2022-10-04 | End: 2022-10-07

## 2022-10-04 RX ORDER — TIMOLOL 0.5 %
1 DROPS OPHTHALMIC (EYE)
Refills: 0 | Status: DISCONTINUED | OUTPATIENT
Start: 2022-10-04 | End: 2022-10-07

## 2022-10-04 RX ORDER — THIAMINE MONONITRATE (VIT B1) 100 MG
100 TABLET ORAL DAILY
Refills: 0 | Status: DISCONTINUED | OUTPATIENT
Start: 2022-10-04 | End: 2022-10-07

## 2022-10-04 RX ADMIN — CEFTRIAXONE 100 MILLIGRAM(S): 500 INJECTION, POWDER, FOR SOLUTION INTRAMUSCULAR; INTRAVENOUS at 01:17

## 2022-10-04 RX ADMIN — Medication 81 MILLIGRAM(S): at 17:47

## 2022-10-04 RX ADMIN — HEPARIN SODIUM 5000 UNIT(S): 5000 INJECTION INTRAVENOUS; SUBCUTANEOUS at 05:30

## 2022-10-04 RX ADMIN — Medication 100 MILLIGRAM(S): at 17:44

## 2022-10-04 RX ADMIN — AMLODIPINE BESYLATE 5 MILLIGRAM(S): 2.5 TABLET ORAL at 05:29

## 2022-10-04 RX ADMIN — HEPARIN SODIUM 5000 UNIT(S): 5000 INJECTION INTRAVENOUS; SUBCUTANEOUS at 17:47

## 2022-10-04 RX ADMIN — Medication 1 DROP(S): at 21:25

## 2022-10-04 NOTE — PROGRESS NOTE ADULT - PROBLEM SELECTOR PLAN 1
-generalized weakness likely 2/2 possible uti, dehydration, R abd pain  -no focal deficits, no trauma, CT pelvis w/o fracture  - pt has right sided abdominal pain, CT showed dilated pancreatic duct without pancreatic mass, GI consulted, recs MRCP to r/o ampullary stenosis/nodule, may need EUS pending MRCP findings  -hx of dementia with acute encephalopathy, improved, CTH (01/4) mild white matter microvascular ischemic disease, no acute path -generalized weakness likely 2/2 possible uti, dehydration, R abd pain  -no focal deficits, no trauma, CT pelvis w/o fracture  - pt has right sided abdominal pain, CT showed dilated pancreatic duct without pancreatic mass, GI consulted, recs MRCP to r/o ampullary stenosis/nodule, may need EUS pending MRCP findings  -hx of dementia with acute encephalopathy, improved, CTH (01/4) mild white matter microvascular ischemic disease, no acute path  -dispo: DC plan home with home PT, RW when medically optimized, pending further workup as above -generalized weakness likely 2/2 possible uti, dehydration, R abd pain  -no focal deficits, no trauma, CT pelvis w/o fracture  - pt has right sided abdominal pain, CT showed dilated pancreatic duct without pancreatic mass, GI consulted, recs MRCP to r/o ampullary stenosis/nodule, may need EUS pending MRCP findings  Need to obtain consent, safety screen from family as pt has dementia  -hx of dementia with acute encephalopathy, improved, CTH (01/4) mild white matter microvascular ischemic disease, no acute path  -dispo: DC plan home with home PT, RW when medically optimized, pending further workup as above

## 2022-10-04 NOTE — PROGRESS NOTE ADULT - ASSESSMENT
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 infection in September (treated with 1 week ABx by PCP) who presents to ED s/p fall with generalized weakness . Also with moderate persistent RLQ pain. Found to have UTI and started on IV Ceftriaxone. During echocardiogram  RRT called for brief period of unresponsiveness associated with sinus bradycardia 48 bpm. Suspect vasovagal response in the setting of abdominal pain. Baseline EKG demonstrates NSR at 80 bpm.  HR trend range from 48 to 80's bpm on telemetry. Patient with +chronotropic competence.     Echo demonstrates minimal MR, normal left atrium and  normal LVEF 69% w/o valvular diseases.   Abdominal CT: No acute findings. Narrow density most prominently in the right iliac wing and body of uncertain etiology but c/w infiltrative marrow process including neoplasm or underlying metabolic disorder.     Plan:   Will continue to monitor.   Avoid AV loni blockers.   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 infection in September (treated with 1 week ABx by PCP) who presents to ED s/p fall with generalized weakness . Also with moderate persistent RLQ pain. Found to have UTI and started on IV Ceftriaxone. During echocardiogram  RRT called for brief period of unresponsiveness associated with sinus bradycardia 48 bpm. Suspect vasovagal response in the setting of abdominal pain. Baseline EKG demonstrates NSR at 80 bpm.  HR trend range from 48 to 80's bpm on telemetry. Patient with +chronotropic competence.     Echo demonstrates minimal MR, normal left atrium and  normal LVEF 69% w/o valvular diseases.   Abdominal CT: No acute findings. Narrow density most prominently in the right iliac wing and body of uncertain etiology but c/w infiltrative marrow process including neoplasm or underlying metabolic disorder.     Plan:   Will continue to monitor.   Avoid AV loni blockers.   Abdominal MRCP

## 2022-10-04 NOTE — PROGRESS NOTE ADULT - SUBJECTIVE AND OBJECTIVE BOX
Patient awake, alert. Denies chest pain, shortness of breath, palpitations or lightheadedness.  Complains of persistent right lower quadrant abdominal pain. Tender to light palpation.      Vital Signs Last 24 Hrs  T(C): 36.7 (04 Oct 2022 11:44), Max: 36.8 (03 Oct 2022 22:00)  T(F): 98 (04 Oct 2022 11:44), Max: 98.2 (03 Oct 2022 22:00)  HR: 69 (04 Oct 2022 11:44) (54 - 81)  BP: 142/77 (04 Oct 2022 11:44) (119/58 - 149/81)  BP(mean): --  RR: 18 (04 Oct 2022 11:44) (17 - 18)  SpO2: 99% (04 Oct 2022 11:44) (98% - 100%)    Parameters below as of 04 Oct 2022 11:44  Patient On (Oxygen Delivery Method): room air          EKG  Telemetry: Sinus rhythm 40-50's. Heart rate increases to 60-70's with minimal movement  MEDICATIONS  (STANDING):  amLODIPine   Tablet 5 milliGRAM(s) Oral daily  aspirin  chewable 81 milliGRAM(s) Oral daily  atorvastatin 40 milliGRAM(s) Oral at bedtime  cefTRIAXone   IVPB 1000 milliGRAM(s) IV Intermittent every 24 hours  heparin   Injectable 5000 Unit(s) SubCutaneous every 12 hours  influenza  Vaccine (HIGH DOSE) 0.7 milliLiter(s) IntraMuscular once  sodium chloride 0.9%. 1000 milliLiter(s) (75 mL/Hr) IV Continuous <Continuous>  thiamine 100 milliGRAM(s) Oral daily    MEDICATIONS  (PRN):  acetaminophen     Tablet .. 650 milliGRAM(s) Oral every 6 hours PRN Temp greater or equal to 38C (100.4F), Mild Pain (1 - 3)          Physical exam:   Gen- well developed well nourished NAD.  +cooperative and follows commands.   Resp- clear to auscultation. No wheezing, rales rhonchi  CV- S1 and S2 RRR. No murmurs, gallops or rubs  ABD- soft nondistended. Right lower quadrant tenderness to palpation radiating up towards liver edge. No masses appreciated. + bowel sounds  EXT- no edema. No calf tenderness  Neuro- grossly nonfocal  +dementia                            12.0   6.52  )-----------( 218      ( 04 Oct 2022 05:44 )             38.9       10-04    142  |  105  |  20  ----------------------------<  85  3.9   |  29  |  0.99    Ca    10.0      04 Oct 2022 05:44  Phos  2.7     10-04  Mg     1.90     10-04    TPro  7.4  /  Alb  3.5  /  TBili  0.4  /  DBili  x   /  AST  30  /  ALT  21  /  AlkPhos  72  10-03    CARDIAC MARKERS ( 02 Oct 2022 21:00 )  x     / x     / 142 U/L / x     / 3.2 ng/mL              Assessment and Plan:         Patient awake, alert. Denies chest pain, shortness of breath, palpitations or lightheadedness.  Complains of persistent right lower quadrant abdominal pain. Denies BRBPR or melena. No diarrhea or constipation.   Patient denies chest pain, shortness of breath, palpitations or lightheadedness.  Heart rate increases from 47 to 70 with minimal movement. No dizziness today.    Vital Signs Last 24 Hrs  T(C): 36.7 (04 Oct 2022 11:44), Max: 36.8 (03 Oct 2022 22:00)  T(F): 98 (04 Oct 2022 11:44), Max: 98.2 (03 Oct 2022 22:00)  HR: 69 (04 Oct 2022 11:44) (54 - 81)  BP: 142/77 (04 Oct 2022 11:44) (119/58 - 149/81)  BP(mean): --  RR: 18 (04 Oct 2022 11:44) (17 - 18)  SpO2: 99% (04 Oct 2022 11:44) (98% - 100%)    Parameters below as of 04 Oct 2022 11:44  Patient On (Oxygen Delivery Method): room air      Telemetry: Sinus rhythm 40-50's. Heart rate increases to 60-70's with minimal movement  MEDICATIONS  (STANDING):  amLODIPine   Tablet 5 milliGRAM(s) Oral daily  aspirin  chewable 81 milliGRAM(s) Oral daily  atorvastatin 40 milliGRAM(s) Oral at bedtime  cefTRIAXone   IVPB 1000 milliGRAM(s) IV Intermittent every 24 hours  heparin   Injectable 5000 Unit(s) SubCutaneous every 12 hours  influenza  Vaccine (HIGH DOSE) 0.7 milliLiter(s) IntraMuscular once  sodium chloride 0.9%. 1000 milliLiter(s) (75 mL/Hr) IV Continuous <Continuous>  thiamine 100 milliGRAM(s) Oral daily    MEDICATIONS  (PRN):  acetaminophen     Tablet .. 650 milliGRAM(s) Oral every 6 hours PRN Temp greater or equal to 38C (100.4F), Mild Pain (1 - 3)          Physical exam:   Gen- well developed well nourished NAD.  +cooperative and follows commands.   Resp- clear to auscultation. No wheezing, rales rhonchi  CV- S1 and S2 RRR. No murmurs, gallops or rubs  ABD- soft,  mild distention.  Right lower quadrant tenderness to palpation radiating up towards liver edge. No masses or bruits appreciated. + bowel sounds  EXT- no edema. No calf tenderness  Neuro- grossly nonfocal  +dementia                            12.0   6.52  )-----------( 218      ( 04 Oct 2022 05:44 )             38.9       10-04    142  |  105  |  20  ----------------------------<  85  3.9   |  29  |  0.99    Ca    10.0      04 Oct 2022 05:44  Phos  2.7     10-04  Mg     1.90     10-04    TPro  7.4  /  Alb  3.5  /  TBili  0.4  /  DBili  x   /  AST  30  /  ALT  21  /  AlkPhos  72  10-03    CARDIAC MARKERS ( 02 Oct 2022 21:00 )  x     / x     / 142 U/L / x     / 3.2 ng/mL        < from: CT Abdomen and Pelvis w/ IV Cont (10.02.22 @ 21:15) >  ACC: [id number] EXAM:  CT ABDOMEN AND PELVIS IC                          PROCEDURE DATE:  10/02/2022          INTERPRETATION:  CLINICAL INFORMATION: Diffuse abdominal tenderness.    COMPARISON: None.    CONTRAST/COMPLICATIONS:  IV Contrast: [id number]  60 cc administered   5 cc discarded  Oral Contrast: NONE  Complications: None reported at time of study completion    PROCEDURE:  CT of the Abdomen and Pelvis was performed.  Sagittal and coronal reformats were performed.    FINDINGS:  LOWER CHEST: Clear    LIVER: Within normal limits.  BILE DUCTS: Common duct dilated up to 11 mm, tapers to 8 mm in the   pancreatic head.  GALLBLADDER: Within normal limits.  SPLEEN: Within normal limits.  PANCREAS: Pancreatic duct is dilated in the pancreatichead and tail 4   mm. No discrete pancreatic mass.  ADRENALS: Within normal limits.  KIDNEYS/URETERS: Absent right kidney. Left kidney is mildly hypertrophic.   No hydronephrosis. Incidental parapelvic cyst 1.3 cm cyst in the upper   pole, Bosniak 2F..    BLADDER: Decompressed, limiting evaluation  REPRODUCTIVE ORGANS: Hysterectomy.    BOWEL: No bowel obstruction. Diverticulosis coli.Normal appendix.  PERITONEUM: No ascites.  VESSELS: Atherosclerotic changes.  RETROPERITONEUM/LYMPH NODES: No lymphadenopathy.  ABDOMINAL WALL: Small fat-containing umbilical hernia.Diffuse anasarca.  BONES: Heterogeneous sclerosis in the right iliac wing and body.   Heterogeneous marrow density in the imaged thoracolumbar spine.    IMPRESSION:    1. No acute CT abnormality to explain the patient's abdominal tenderness.  2. 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  3. Heterogeneous marrow density, most prominently in the right iliac wing   and body. This is of uncertain etiology, probably related to an   infiltrative marrow process including neoplasm or underlying metabolic   disorder.      MARIO RUSSELL MD; Resident Radiologist  This document has been electronically signed.  JANE NARANJO MD; Attending Radiologist  This document has been electronically signed. Oct  2 2022 10:43PM          < from: Transthoracic Echocardiogram (10.03.22 @ 10:48) >  Patient name: DERRICK LEON  YOB: 1940   Age: 82 (F)   MR#: [id number]  Study Date: 10/3/2022  [facility]  Study quality: Technically Fair  Referring Physician: Vamsi Hobson MD  Blood Pressure: 143/64 mmHg  Height: 170 cm  Weight: 73 kg  BSA: 1.8 m2  ------------------------------------------------------------------------  PROCEDURE: Transthoracic echocardiogram with 2-D, M-Mode  and complete spectral and color flow Doppler.  INDICATION: Abnormal electrocardiogram (ECG) (EKG) (R94.31)  ------------------------------------------------------------------------  DIMENSIONS:  Dimensions:     Normal Values:  LA:     3.3 cm    2.0 - 4.0 cm  Ao:     2.6 cm    2.0 - 3.8 cm  SEPTUM: 0.8 cm    0.6 - 1.2 cm  PWT:    0.8 cm    0.6 - 1.1 cm  LVIDd:  4.2 cm    3.0 - 5.6 cm  LVIDs:  2.6 cm    1.8 - 4.0 cm  Derived Variables:  LVMI: 55 g/m2  RWT: 0.38  Fractional short: 38 %  Ejection Fraction (Modified Patel Rule): 69 %  ------------------------------------------------------------------------  OBSERVATIONS:  Mitral Valve: Mitral annular calcification, otherwise  normal mitral valve. Minimal mitral regurgitation.  Aortic Root: Normal aortic root.  Aortic Valve: Aortic valve not well visualized.  Left Atrium: Normal left atrium.  Left Ventricle: Normal left ventricular systolic function.  No segmental wall motion abnormalities. Normal left  ventricular internal dimensions and wall thicknesses.  Right Heart: Normal right atrium. Normal right ventricular  size and function. Normal tricuspid valve. Mild-moderate  tricuspid regurgitation. Normal pulmonic valve. Minimal  pulmonic regurgitation.  Pericardium/PleuraNormal pericardium with no pericardial  effusion.  ------------------------------------------------------------------------  CONCLUSIONS:  1. Mitral annular calcification, otherwise normal mitral  valve. Minimal mitral regurgitation.  2. Normal left ventricular internal dimensions and wall  thicknesses.  3. Normal left ventricular systolic function. No segmental  wall motion abnormalities.  4. Normal right ventricular size and function.  ------------------------------------------------------------------------  < from: Transthoracic Echocardiogram (10.03.22 @ 10:48) >  Confirmed on  10/3/2022 - 12:08:45 by Sarthak Melo M.D.,  [facility], [place]  ------------------------------------------------------------------------

## 2022-10-04 NOTE — PROGRESS NOTE ADULT - PROBLEM SELECTOR PLAN 3
-isabelle likely d/t dehydration, mild proteinuria likely d/t solitary L kidney   -no monoclonal gammopathy on SPEP  -s/p fluids, isabelle resolving   -has hx of right nephrectomy so monitor urine closely, no hydro on CT, left kidney mild  hypertrophy, L renal cyst

## 2022-10-04 NOTE — PHYSICAL THERAPY INITIAL EVALUATION ADULT - NSPTDISCHREC_GEN_A_CORE
anticipated discharge to home with home PT services to address current functional limitations to optimize safety within the home environment with the use of a rolling walker./Home PT

## 2022-10-04 NOTE — PROGRESS NOTE ADULT - SUBJECTIVE AND OBJECTIVE BOX
Vital Signs Last 24 Hrs  T(C): 36.7 (04 Oct 2022 11:44), Max: 36.8 (03 Oct 2022 22:00)  T(F): 98 (04 Oct 2022 11:44), Max: 98.2 (03 Oct 2022 22:00)  HR: 69 (04 Oct 2022 11:44) (54 - 81)  BP: 142/77 (04 Oct 2022 11:44) (119/58 - 149/81)  BP(mean): --  RR: 18 (04 Oct 2022 11:44) (16 - 18)  SpO2: 99% (04 Oct 2022 11:44) (98% - 100%)    Parameters below as of 04 Oct 2022 11:44  Patient On (Oxygen Delivery Method): room air          EKG  Telemetry:  sinus bradycardia 40-60's during sleeping hours. Increased to 70-80 bpm.  MEDICATIONS  (STANDING):  amLODIPine   Tablet 5 milliGRAM(s) Oral daily  aspirin  chewable 81 milliGRAM(s) Oral daily  atorvastatin 40 milliGRAM(s) Oral at bedtime  cefTRIAXone   IVPB 1000 milliGRAM(s) IV Intermittent every 24 hours  heparin   Injectable 5000 Unit(s) SubCutaneous every 12 hours  influenza  Vaccine (HIGH DOSE) 0.7 milliLiter(s) IntraMuscular once  sodium chloride 0.9%. 1000 milliLiter(s) (75 mL/Hr) IV Continuous <Continuous>  thiamine 100 milliGRAM(s) Oral daily    MEDICATIONS  (PRN):  acetaminophen     Tablet .. 650 milliGRAM(s) Oral every 6 hours PRN Temp greater or equal to 38C (100.4F), Mild Pain (1 - 3)          Physical exam:   Gen-   Resp- clear to auscultation. No wheezing, rales or rhonchi  CV- S1 and S2 RRR. No murmurs, gallops or rubs  ABD-   EXT-  Neuro                            12.0   6.52  )-----------( 218      ( 04 Oct 2022 05:44 )             38.9       10-04    142  |  105  |  20  ----------------------------<  85  3.9   |  29  |  0.99    Ca    10.0      04 Oct 2022 05:44  Phos  2.7     10-04  Mg     1.90     10-04    TPro  7.4  /  Alb  3.5  /  TBili  0.4  /  DBili  x   /  AST  30  /  ALT  21  /  AlkPhos  72  10-03    CARDIAC MARKERS ( 02 Oct 2022 21:00 )  x     / x     / 142 U/L / x     / 3.2 ng/mL              Assessment and Plan:

## 2022-10-04 NOTE — PHYSICAL THERAPY INITIAL EVALUATION ADULT - ADDITIONAL COMMENTS
Pt lives in a house with her granddaughter, +stairs to enter. prior to admission pt was independent with a straight cane and occasionally required assist with bed mobility. Pt stated she will always have someone at home to assist.     Pt. left comfortable on stretcher, NAD, pt transport present.

## 2022-10-04 NOTE — PROGRESS NOTE ADULT - PROBLEM SELECTOR PLAN 5
- trend cbc, doubt mds  - b12 1149, folate 10.5  -spot urine protein/cr ratio 0.2, check free light chains

## 2022-10-04 NOTE — PHYSICAL THERAPY INITIAL EVALUATION ADULT - PERTINENT HX OF CURRENT PROBLEM, REHAB EVAL
Patient is an 82 year old female who presented to [facility] for generalized weakness and near fall. PMH: dementia, further PMH below. CT pelvis without fracture. S/P RRT in ED for symptomatic bradycardia, EP consutlted. CT showed dilated pancreatic duct without pancreatic mass, GI was consulted

## 2022-10-04 NOTE — PROGRESS NOTE ADULT - PROBLEM SELECTOR PLAN 7
-meds rec done with family  takes aricept 10 mg at home -meds rec done with family  takes aricept 10 mg at home, hold for now in s/o bradycardia/vasovagal episode

## 2022-10-04 NOTE — CHART NOTE - NSCHARTNOTEFT_GEN_A_CORE
Vital Signs Last 24 Hrs  T(C): 36.7 (04 Oct 2022 11:44), Max: 36.8 (03 Oct 2022 22:00)  T(F): 98 (04 Oct 2022 11:44), Max: 98.2 (03 Oct 2022 22:00)  HR: 69 (04 Oct 2022 11:44) (54 - 81)  BP: 142/77 (04 Oct 2022 11:44) (119/58 - 149/81)  BP(mean): --  RR: 18 (04 Oct 2022 11:44) (17 - 18)  SpO2: 99% (04 Oct 2022 11:44) (98% - 100%)    Parameters below as of 04 Oct 2022 11:44  Patient On (Oxygen Delivery Method): room air      Results and case discussed with Dr. Chang. Patient for MRCP, obtained safety sheet/consent from granddalilibeth Weiss at bedside, all questions answered and updates provided. Called MRI tech patient on schedule likely later evening (few patients prior to her), discussed with RN

## 2022-10-04 NOTE — PROGRESS NOTE ADULT - SUBJECTIVE AND OBJECTIVE BOX
Dr. Cami Chang  Pager [phone]    PROGRESS NOTE:     Patient is a 82y old  Female who presents with a chief complaint of Near collapse (03 Oct 2022 14:58)      SUBJECTIVE / OVERNIGHT EVENTS: pt has rlq abd pain/discomfort  ADDITIONAL REVIEW OF SYSTEMS: afebrile, had CT head this morning     MEDICATIONS  (STANDING):  amLODIPine   Tablet 5 milliGRAM(s) Oral daily  aspirin  chewable 81 milliGRAM(s) Oral daily  atorvastatin 40 milliGRAM(s) Oral at bedtime  cefTRIAXone   IVPB 1000 milliGRAM(s) IV Intermittent every 24 hours  heparin   Injectable 5000 Unit(s) SubCutaneous every 12 hours  influenza  Vaccine (HIGH DOSE) 0.7 milliLiter(s) IntraMuscular once  sodium chloride 0.9%. 1000 milliLiter(s) (75 mL/Hr) IV Continuous <Continuous>    MEDICATIONS  (PRN):  acetaminophen     Tablet .. 650 milliGRAM(s) Oral every 6 hours PRN Temp greater or equal to 38C (100.4F), Mild Pain (1 - 3)      CAPILLARY BLOOD GLUCOSE        I&O's Summary      PHYSICAL EXAM:  Vital Signs Last 24 Hrs  T(C): 36.7 (04 Oct 2022 09:25), Max: 36.8 (03 Oct 2022 22:00)  T(F): 98 (04 Oct 2022 09:25), Max: 98.2 (03 Oct 2022 22:00)  HR: 62 (04 Oct 2022 09:25) (50 - 81)  BP: 122/65 (04 Oct 2022 09:25) (119/58 - 149/81)  BP(mean): --  RR: 18 (04 Oct 2022 09:25) (16 - 18)  SpO2: 98% (04 Oct 2022 09:25) (98% - 100%)    Parameters below as of 04 Oct 2022 09:25  Patient On (Oxygen Delivery Method): room air      CONSTITUTIONAL: NAD, well-developed  RESPIRATORY: Normal respiratory effort; lungs are clear to auscultation bilaterally  CARDIOVASCULAR: Regular rate and rhythm, normal S1 and S2, no murmur/rub/gallop; No lower extremity edema; Peripheral pulses are 2+ bilaterally  ABDOMEN: mild rlq ttp, soft, normoactive bowel sounds, no rebound/guarding; No hepatosplenomegaly  MUSCULOSKELETAL: no clubbing or cyanosis of digits; no joint swelling or tenderness to palpation  PSYCH: A+O to person, place, affect appropriate  Neuro: has dementia , nonfocal, CNII-XII grossly intact       LABS:                        12.0   6.52  )-----------( 218      ( 04 Oct 2022 05:44 )             38.9     10    142  |  105  |  20  ----------------------------<  85  3.9   |  29  |  0.99    Ca    10.0      04 Oct 2022 05:44  Phos  2.7     10-  Mg     1.90     10    TPro  7.4  /  Alb  3.5  /  TBili  0.4  /  DBili  x   /  AST  30  /  ALT  21  /  AlkPhos  72  10      CARDIAC MARKERS ( 02 Oct 2022 21:00 )  x     / x     / 142 U/L / x     / 3.2 ng/mL      Urinalysis Basic - ( 03 Oct 2022 16:57 )    Color: Light Yellow / Appearance: Clear / S.019 / pH: x  Gluc: x / Ketone: Negative  / Bili: Negative / Urobili: <2 mg/dL   Blood: x / Protein: Trace / Nitrite: Negative   Leuk Esterase: Negative / RBC: 1 /HPF / WBC 2 /HPF   Sq Epi: x / Non Sq Epi: 5 /HPF / Bacteria: Negative        Culture - Urine (collected 02 Oct 2022 19:58)  Source: Clean Catch Clean Catch (Midstream)  Final Report (04 Oct 2022 10:54):    No growth        RADIOLOGY & ADDITIONAL TESTS:  Results Reviewed:   Imaging Personally Reviewed:  < from: CT Head No Cont (10.04.22 @ 09:49) >  IMPRESSION:    No hydrocephalus, acute intracranial hemorrhage, mass effect, or brain   edema.    Nonspecific mild lucency in the bilateral cerebral white matter, likely   mild white matter microvascular ischemic disease. Differential diagnosis   includes infectious, inflammatory, and post infectious/inflammatory   processes.    Electrocardiogram Personally Reviewed:    COORDINATION OF CARE:  Care Discussed with Consultants/Other Providers [Y/N]: jo youssef, get consent from family, expedite mri  Prior or Outpatient Records Reviewed [Y/N]:

## 2022-10-04 NOTE — PROGRESS NOTE ADULT - PROBLEM SELECTOR PLAN 2
-bradycardic while sleeping to 50s, goes to 80s while awake  -tele monitor   -Q waves seen anterior leads, daughter reports hx prior MI  -troponin elevation very mild, denies chest pain, likely elevation 2/2 FORD   nml cpk, troponin downtrending 80-->61  - TTE (10/3) Normal LV/RV function, EF 69%, No RWMA  -pt had a RRT 10/3 when she went for echo with brief period of unresponsiveness, bradycardic, ?vasovagal episode, EP consulted, no indication for pacer, aricept dc'd

## 2022-10-05 LAB
ANION GAP SERPL CALC-SCNC: 10 MMOL/L — SIGNIFICANT CHANGE UP (ref 7–14)
BUN SERPL-MCNC: 20 MG/DL — SIGNIFICANT CHANGE UP (ref 7–23)
CALCIUM SERPL-MCNC: 9.9 MG/DL — SIGNIFICANT CHANGE UP (ref 8.4–10.5)
CHLORIDE SERPL-SCNC: 107 MMOL/L — SIGNIFICANT CHANGE UP (ref 98–107)
CO2 SERPL-SCNC: 25 MMOL/L — SIGNIFICANT CHANGE UP (ref 22–31)
CREAT SERPL-MCNC: 0.89 MG/DL — SIGNIFICANT CHANGE UP (ref 0.5–1.3)
EGFR: 64 ML/MIN/1.73M2 — SIGNIFICANT CHANGE UP
GLUCOSE SERPL-MCNC: 80 MG/DL — SIGNIFICANT CHANGE UP (ref 70–99)
HCT VFR BLD CALC: 40.3 % — SIGNIFICANT CHANGE UP (ref 34.5–45)
HGB BLD-MCNC: 12.1 G/DL — SIGNIFICANT CHANGE UP (ref 11.5–15.5)
MAGNESIUM SERPL-MCNC: 2 MG/DL — SIGNIFICANT CHANGE UP (ref 1.6–2.6)
MCHC RBC-ENTMCNC: 28 PG — SIGNIFICANT CHANGE UP (ref 27–34)
MCHC RBC-ENTMCNC: 30 GM/DL — LOW (ref 32–36)
MCV RBC AUTO: 93.3 FL — SIGNIFICANT CHANGE UP (ref 80–100)
NRBC # BLD: 0 /100 WBCS — SIGNIFICANT CHANGE UP (ref 0–0)
NRBC # FLD: 0 K/UL — SIGNIFICANT CHANGE UP (ref 0–0)
PHOSPHATE SERPL-MCNC: 2.9 MG/DL — SIGNIFICANT CHANGE UP (ref 2.5–4.5)
PLATELET # BLD AUTO: 219 K/UL — SIGNIFICANT CHANGE UP (ref 150–400)
POTASSIUM SERPL-MCNC: 4.3 MMOL/L — SIGNIFICANT CHANGE UP (ref 3.5–5.3)
POTASSIUM SERPL-SCNC: 4.3 MMOL/L — SIGNIFICANT CHANGE UP (ref 3.5–5.3)
RBC # BLD: 4.32 M/UL — SIGNIFICANT CHANGE UP (ref 3.8–5.2)
RBC # FLD: 13.2 % — SIGNIFICANT CHANGE UP (ref 10.3–14.5)
SODIUM SERPL-SCNC: 142 MMOL/L — SIGNIFICANT CHANGE UP (ref 135–145)
WBC # BLD: 6.5 K/UL — SIGNIFICANT CHANGE UP (ref 3.8–10.5)
WBC # FLD AUTO: 6.5 K/UL — SIGNIFICANT CHANGE UP (ref 3.8–10.5)

## 2022-10-05 PROCEDURE — 74183 MRI ABD W/O CNTR FLWD CNTR: CPT | Mod: 26

## 2022-10-05 PROCEDURE — 99232 SBSQ HOSP IP/OBS MODERATE 35: CPT

## 2022-10-05 RX ORDER — SODIUM CHLORIDE 9 MG/ML
1000 INJECTION, SOLUTION INTRAVENOUS
Refills: 0 | Status: DISCONTINUED | OUTPATIENT
Start: 2022-10-05 | End: 2022-10-07

## 2022-10-05 RX ADMIN — DORZOLAMIDE HYDROCHLORIDE 1 DROP(S): 20 SOLUTION/ DROPS OPHTHALMIC at 18:19

## 2022-10-05 RX ADMIN — AMLODIPINE BESYLATE 5 MILLIGRAM(S): 2.5 TABLET ORAL at 05:27

## 2022-10-05 RX ADMIN — BRIMONIDINE TARTRATE 1 DROP(S): 2 SOLUTION/ DROPS OPHTHALMIC at 05:31

## 2022-10-05 RX ADMIN — Medication 100 MILLIGRAM(S): at 18:22

## 2022-10-05 RX ADMIN — DORZOLAMIDE HYDROCHLORIDE 1 DROP(S): 20 SOLUTION/ DROPS OPHTHALMIC at 05:30

## 2022-10-05 RX ADMIN — Medication 1 DROP(S): at 07:13

## 2022-10-05 RX ADMIN — HEPARIN SODIUM 5000 UNIT(S): 5000 INJECTION INTRAVENOUS; SUBCUTANEOUS at 05:28

## 2022-10-05 RX ADMIN — CYCLOSPORINE 1 DROP(S): 0.5 EMULSION OPHTHALMIC at 05:30

## 2022-10-05 RX ADMIN — Medication 81 MILLIGRAM(S): at 18:22

## 2022-10-05 RX ADMIN — BRIMONIDINE TARTRATE 1 DROP(S): 2 SOLUTION/ DROPS OPHTHALMIC at 18:19

## 2022-10-05 RX ADMIN — CEFTRIAXONE 100 MILLIGRAM(S): 500 INJECTION, POWDER, FOR SOLUTION INTRAMUSCULAR; INTRAVENOUS at 01:07

## 2022-10-05 RX ADMIN — CYCLOSPORINE 1 DROP(S): 0.5 EMULSION OPHTHALMIC at 18:20

## 2022-10-05 RX ADMIN — Medication 1 DROP(S): at 18:19

## 2022-10-05 NOTE — PROGRESS NOTE ADULT - NS ATTEND AMEND GEN_ALL_CORE FT
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 infection in September (treated with 1 week ABx by PCP) who presents to ED s/p fall with generalized weakness yesterday. Patient endorses moderate persistent RLQ pain. Patient was found to have UTI and was started on IV Ceftriaxone.  Suspect patient probably had vasovagal response at time of RRT noticed in sinus myorn down in low 40's and in the setting of abdominal pain.   Baseline EKG demonstrates NSR at 80 bpm.  HR trend range from 48 to 80's bpm on telemetry. Avoid AVN blockers- likely vagal mediated. No EP interventions at this time.
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 infection in September (treated with 1 week ABx by PCP) who presents to ED s/p fall with generalized weakness yesterday. Patient endorses moderate persistent RLQ pain. Patient was found to have UTI and was started on IV Ceftriaxone.  Suspect patient probably had vasovagal response at time of RRT noticed in sinus myron down in low 40's and in the setting of abdominal pain.   Baseline EKG demonstrates NSR at 80 bpm.  HR trend range from 48 to 80's bpm on telemetry. Avoid AVN blockers- likely vagal mediated. Will follow.

## 2022-10-05 NOTE — PROGRESS NOTE ADULT - PROBLEM SELECTOR PLAN 7
-meds rec done with family  takes aricept 10 mg at home, hold for now in s/o bradycardia/vasovagal episode

## 2022-10-05 NOTE — CHART NOTE - NSCHARTNOTEFT_GEN_A_CORE
Brief GI update  ==========  MRCP reviewed. Plan for EUS +/- ERCP, tentatively tomorrow.    - Please make NPO @ midnight  - Repeat COVID PCR today  - CBC, CMP, coags @ 3AM      Thank you for involving us in the care of this patient. Please reach out if any further questions.    Ivan Haynes, PGY-6  Gastroenterology/Hepatology Fellow    Available on [employer] Teams  After 5PM/Weekends, please contact the on-call GI fellow: [phone]

## 2022-10-05 NOTE — PROGRESS NOTE ADULT - ASSESSMENT
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 Also with moderate persistent RLQ pain. Found to have UTI and started on IV Ceftriaxone. During echocardiogram RRT called for brief period of unresponsiveness associated with sinus bradycardia 48 bpm. Suspect vasovagal response in the setting of abdominal pain. Baseline EKG demonstrates NSR at 80 bpm. HR trend range from 48 to 80's bpm on telemetry. Patient with +chronotropic competence.   Echo demonstrates minimal MR, normal left atrium and  normal LVEF 69% w/o valvular diseases.   Abdominal CT: No acute findings. Narrow density most prominently in the right iliac wing and body of uncertain etiology but c/w infiltrative marrow process including neoplasm or underlying metabolic disorder.     Plan:   Will continue to monitor.   Avoid AV loni blockers.   Maintain K+~4.0 and Mg++~2.0  No pacing indications at this time  Continue management as per primary team  EP will sign off and please call with any questions

## 2022-10-05 NOTE — PROGRESS NOTE ADULT - SUBJECTIVE AND OBJECTIVE BOX
Patient is seen and examined. Denies any chest pain, SOB, palpitations or dizziness    PAST MEDICAL & SURGICAL HISTORY:  Dementia    HTN (hypertension)    HLD (hyperlipidemia)    H/O right nephrectomy        MEDICATIONS  (STANDING):  amLODIPine   Tablet 5 milliGRAM(s) Oral daily  aspirin  chewable 81 milliGRAM(s) Oral daily  brimonidine 0.2% Ophthalmic Solution 1 Drop(s) Both EYES two times a day  cycloSPORINE (RESTASIS) 0.05% Emulsion 1 Drop(s) Both EYES two times a day  dorzolamide 2% Ophthalmic Solution 1 Drop(s) Both EYES <User Schedule>  heparin   Injectable 5000 Unit(s) SubCutaneous every 12 hours  influenza  Vaccine (HIGH DOSE) 0.7 milliLiter(s) IntraMuscular once  sodium chloride 0.9%. 1000 milliLiter(s) (75 mL/Hr) IV Continuous <Continuous>  thiamine 100 milliGRAM(s) Oral daily  timolol 0.5% Solution 1 Drop(s) Both EYES two times a day    MEDICATIONS  (PRN):  acetaminophen     Tablet .. 650 milliGRAM(s) Oral every 6 hours PRN Temp greater or equal to 38C (100.4F), Mild Pain (1 - 3)      Vital Signs Last 24 Hrs  T(C): 36.7 (05 Oct 2022 09:00), Max: 36.9 (04 Oct 2022 21:00)  T(F): 98 (05 Oct 2022 09:00), Max: 98.4 (04 Oct 2022 21:00)  HR: 62 (05 Oct 2022 09:00) (51 - 69)  BP: 143/60 (05 Oct 2022 09:00) (118/55 - 149/63)  BP(mean): --  RR: 18 (05 Oct 2022 09:00) (18 - 18)  SpO2: 98% (05 Oct 2022 09:00) (97% - 99%)    Parameters below as of 05 Oct 2022 09:00  Patient On (Oxygen Delivery Method): room air    INTERPRETATION OF TELEMETRY: Sinus rhythm with HR 60s-80s    LABS:                        12.1   6.50  )-----------( 219      ( 05 Oct 2022 05:18 )             40.3     10-05    142  |  107  |  20  ----------------------------<  80  4.3   |  25  |  0.89    Ca    9.9      05 Oct 2022 05:18  Phos  2.9     10-05  Mg     2.00     10-05    TPro  7.4  /  Alb  3.5  /  TBili  0.4  /  DBili  x   /  AST  30  /  ALT  21  /  AlkPhos  72  10-03          Urinalysis Basic - ( 03 Oct 2022 16:57 )    Color: Light Yellow / Appearance: Clear / S.019 / pH: x  Gluc: x / Ketone: Negative  / Bili: Negative / Urobili: <2 mg/dL   Blood: x / Protein: Trace / Nitrite: Negative   Leuk Esterase: Negative / RBC: 1 /HPF / WBC 2 /HPF   Sq Epi: x / Non Sq Epi: 5 /HPF / Bacteria: Negative      I&O's Summary    04 Oct 2022 07:01  -  05 Oct 2022 07:00  --------------------------------------------------------  IN: 440 mL / OUT: 900 mL / NET: -460 mL      PHYSICAL EXAM:    GENERAL: In no apparent distress, well nourished, and hydrated.  HEART: Regular rate and rhythm; No murmurs, rubs, or gallops.  PULMONARY: Clear to auscultation and percussion.  No rales, wheezing, or rhonchi bilaterally.  ABDOMEN: Soft, Nontender, Nondistended; Bowel sounds present  EXTREMITIES:  2+ Peripheral Pulses, No clubbing, cyanosis, or edema

## 2022-10-05 NOTE — PROGRESS NOTE ADULT - SUBJECTIVE AND OBJECTIVE BOX
Dr. Cami Chang  Pager [phone]    PROGRESS NOTE:     Patient is a 82y old  Female who presents with a chief complaint of Near collapse (05 Oct 2022 10:09)      SUBJECTIVE / OVERNIGHT EVENTS: denies chest pain or sob  ADDITIONAL REVIEW OF SYSTEMS: afebrile , no abd pain    MEDICATIONS  (STANDING):  amLODIPine   Tablet 5 milliGRAM(s) Oral daily  aspirin  chewable 81 milliGRAM(s) Oral daily  brimonidine 0.2% Ophthalmic Solution 1 Drop(s) Both EYES two times a day  cycloSPORINE (RESTASIS) 0.05% Emulsion 1 Drop(s) Both EYES two times a day  dorzolamide 2% Ophthalmic Solution 1 Drop(s) Both EYES <User Schedule>  heparin   Injectable 5000 Unit(s) SubCutaneous every 12 hours  influenza  Vaccine (HIGH DOSE) 0.7 milliLiter(s) IntraMuscular once  sodium chloride 0.9%. 1000 milliLiter(s) (75 mL/Hr) IV Continuous <Continuous>  thiamine 100 milliGRAM(s) Oral daily  timolol 0.5% Solution 1 Drop(s) Both EYES two times a day    MEDICATIONS  (PRN):  acetaminophen     Tablet .. 650 milliGRAM(s) Oral every 6 hours PRN Temp greater or equal to 38C (100.4F), Mild Pain (1 - 3)      CAPILLARY BLOOD GLUCOSE        I&O's Summary    04 Oct 2022 07:01  -  05 Oct 2022 07:00  --------------------------------------------------------  IN: 440 mL / OUT: 900 mL / NET: -460 mL        PHYSICAL EXAM:  Vital Signs Last 24 Hrs  T(C): 36.7 (05 Oct 2022 09:00), Max: 36.9 (04 Oct 2022 21:00)  T(F): 98 (05 Oct 2022 09:00), Max: 98.4 (04 Oct 2022 21:00)  HR: 62 (05 Oct 2022 09:00) (51 - 69)  BP: 143/60 (05 Oct 2022 09:00) (118/55 - 149/63)  BP(mean): --  RR: 18 (05 Oct 2022 09:00) (18 - 18)  SpO2: 98% (05 Oct 2022 09:00) (97% - 99%)    Parameters below as of 05 Oct 2022 09:00  Patient On (Oxygen Delivery Method): room air    CONSTITUTIONAL: NAD, well-developed  RESPIRATORY: Normal respiratory effort; lungs are clear to auscultation bilaterally  CARDIOVASCULAR: Regular rate and rhythm, normal S1 and S2, no murmur/rub/gallop; No lower extremity edema; Peripheral pulses are 2+ bilaterally  ABDOMEN: soft, nt/nd, +bs, no organomegaly  MUSCULOSKELETAL: no clubbing or cyanosis of digits; no joint swelling or tenderness to palpation  PSYCH: A+O to person, place, affect appropriate  Neuro: has dementia , nonfocal, CNII-XII grossly intact       LABS:                        12.1   6.50  )-----------( 219      ( 05 Oct 2022 05:18 )             40.3     10-05    142  |  107  |  20  ----------------------------<  80  4.3   |  25  |  0.89    Ca    9.9      05 Oct 2022 05:18  Phos  2.9     10-05  Mg     2.00     10-05    TPro  7.4  /  Alb  3.5  /  TBili  0.4  /  DBili  x   /  AST  30  /  ALT  21  /  AlkPhos  72  10-03          Urinalysis Basic - ( 03 Oct 2022 16:57 )    Color: Light Yellow / Appearance: Clear / S.019 / pH: x  Gluc: x / Ketone: Negative  / Bili: Negative / Urobili: <2 mg/dL   Blood: x / Protein: Trace / Nitrite: Negative   Leuk Esterase: Negative / RBC: 1 /HPF / WBC 2 /HPF   Sq Epi: x / Non Sq Epi: 5 /HPF / Bacteria: Negative        Culture - Urine (collected 02 Oct 2022 19:58)  Source: Clean Catch Clean Catch (Midstream)  Final Report (04 Oct 2022 10:54):    No growth        RADIOLOGY & ADDITIONAL TESTS:  Results Reviewed:   Imaging Personally Reviewed:  < from: CT Head No Cont (10.04.22 @ 09:49) >  No hydrocephalus, acute intracranial hemorrhage, mass effect, or brain   edema.    Nonspecific mild lucency in the bilateral cerebral white matter, likely   mild white matter microvascular ischemic disease. Differential diagnosis   includes infectious, inflammatory, and post infectious/inflammatory   processes.      Electrocardiogram Personally Reviewed:    COORDINATION OF CARE:  Care Discussed with Consultants/Other Providers [Y/N]:  Prior or Outpatient Records Reviewed [Y/N]:

## 2022-10-05 NOTE — PROGRESS NOTE ADULT - PROBLEM SELECTOR PLAN 1
-generalized weakness likely 2/2 possible uti, dehydration, R abd pain  -no focal deficits, no trauma, CT pelvis w/o fracture  - pt had right sided abdominal pain, CT showed dilated pancreatic duct without pancreatic mass, GI consulted, recs MRCP to r/o ampullary stenosis/nodule, may need EUS pending MRCP findings  EXPEDITE MRI, consent/safety screen in chart  -hx of dementia with acute encephalopathy, mental status now close to baseline   CTH (01/4) mild white matter microvascular ischemic disease, no acute path  -dispo: DC plan home with home PT, RW when medically optimized, pending further workup as above

## 2022-10-05 NOTE — PROGRESS NOTE ADULT - PROBLEM SELECTOR PLAN 3
-isabelle likely d/t dehydration, mild proteinuria likely d/t solitary L kidney   -no monoclonal gammopathy on SPEP, kappa/lambda ratio 1.5 (wnl)  -s/p fluids, isabelle resolved  -has hx of right nephrectomy so monitor urine closely, no hydro on CT, left kidney mild  hypertrophy, L renal cyst

## 2022-10-05 NOTE — PROGRESS NOTE ADULT - PROBLEM SELECTOR PLAN 2
-bradycardic while sleeping to 50s, goes to 80s while awake  -tele monitor   -Q waves seen anterior leads, daughter reports hx prior MI  -troponin elevation very mild, denies chest pain, likely elevation 2/2 FORD   nml cpk, troponin downtrending 80-->61  - TTE (10/3) Normal LV/RV function, EF 69%, No RWMA  -pt had a acute encephalopathy with RRT 10/3 when she went for echo with brief period of unresponsiveness, bradycardic, ?vasovagal episode, EP consulted, no indication for pacer, aricept dc'd  - EP f/u appreciated

## 2022-10-06 ENCOUNTER — TRANSCRIPTION ENCOUNTER (OUTPATIENT)
Age: 82
End: 2022-10-06

## 2022-10-06 ENCOUNTER — RESULT REVIEW (OUTPATIENT)
Age: 82
End: 2022-10-06

## 2022-10-06 LAB
ANION GAP SERPL CALC-SCNC: 9 MMOL/L — SIGNIFICANT CHANGE UP (ref 7–14)
APTT BLD: 29 SEC — SIGNIFICANT CHANGE UP (ref 27–36.3)
BLD GP AB SCN SERPL QL: NEGATIVE — SIGNIFICANT CHANGE UP
BUN SERPL-MCNC: 18 MG/DL — SIGNIFICANT CHANGE UP (ref 7–23)
CALCIUM SERPL-MCNC: 10.4 MG/DL — SIGNIFICANT CHANGE UP (ref 8.4–10.5)
CHLORIDE SERPL-SCNC: 106 MMOL/L — SIGNIFICANT CHANGE UP (ref 98–107)
CO2 SERPL-SCNC: 28 MMOL/L — SIGNIFICANT CHANGE UP (ref 22–31)
CREAT SERPL-MCNC: 1.01 MG/DL — SIGNIFICANT CHANGE UP (ref 0.5–1.3)
EGFR: 55 ML/MIN/1.73M2 — LOW
GLUCOSE SERPL-MCNC: 88 MG/DL — SIGNIFICANT CHANGE UP (ref 70–99)
HCT VFR BLD CALC: 37.9 % — SIGNIFICANT CHANGE UP (ref 34.5–45)
HGB BLD-MCNC: 11.5 G/DL — SIGNIFICANT CHANGE UP (ref 11.5–15.5)
INR BLD: 1.06 RATIO — SIGNIFICANT CHANGE UP (ref 0.88–1.16)
MAGNESIUM SERPL-MCNC: 2 MG/DL — SIGNIFICANT CHANGE UP (ref 1.6–2.6)
MCHC RBC-ENTMCNC: 28 PG — SIGNIFICANT CHANGE UP (ref 27–34)
MCHC RBC-ENTMCNC: 30.3 GM/DL — LOW (ref 32–36)
MCV RBC AUTO: 92.2 FL — SIGNIFICANT CHANGE UP (ref 80–100)
NRBC # BLD: 0 /100 WBCS — SIGNIFICANT CHANGE UP (ref 0–0)
NRBC # FLD: 0 K/UL — SIGNIFICANT CHANGE UP (ref 0–0)
PHOSPHATE SERPL-MCNC: 3 MG/DL — SIGNIFICANT CHANGE UP (ref 2.5–4.5)
PLATELET # BLD AUTO: 248 K/UL — SIGNIFICANT CHANGE UP (ref 150–400)
POTASSIUM SERPL-MCNC: 4.3 MMOL/L — SIGNIFICANT CHANGE UP (ref 3.5–5.3)
POTASSIUM SERPL-SCNC: 4.3 MMOL/L — SIGNIFICANT CHANGE UP (ref 3.5–5.3)
PROTHROM AB SERPL-ACNC: 12.3 SEC — SIGNIFICANT CHANGE UP (ref 10.5–13.4)
RBC # BLD: 4.11 M/UL — SIGNIFICANT CHANGE UP (ref 3.8–5.2)
RBC # FLD: 13.1 % — SIGNIFICANT CHANGE UP (ref 10.3–14.5)
RH IG SCN BLD-IMP: POSITIVE — SIGNIFICANT CHANGE UP
SARS-COV-2 RNA SPEC QL NAA+PROBE: SIGNIFICANT CHANGE UP
SODIUM SERPL-SCNC: 143 MMOL/L — SIGNIFICANT CHANGE UP (ref 135–145)
WBC # BLD: 6.13 K/UL — SIGNIFICANT CHANGE UP (ref 3.8–10.5)
WBC # FLD AUTO: 6.13 K/UL — SIGNIFICANT CHANGE UP (ref 3.8–10.5)

## 2022-10-06 PROCEDURE — 43274 ERCP DUCT STENT PLACEMENT: CPT | Mod: GC

## 2022-10-06 PROCEDURE — 88305 TISSUE EXAM BY PATHOLOGIST: CPT | Mod: 26

## 2022-10-06 PROCEDURE — 43239 EGD BIOPSY SINGLE/MULTIPLE: CPT | Mod: XU,GC

## 2022-10-06 PROCEDURE — 88305 TISSUE EXAM BY PATHOLOGIST: CPT | Mod: 26,59

## 2022-10-06 PROCEDURE — 43259 EGD US EXAM DUODENUM/JEJUNUM: CPT | Mod: GC

## 2022-10-06 PROCEDURE — 99232 SBSQ HOSP IP/OBS MODERATE 35: CPT

## 2022-10-06 PROCEDURE — 43261 ENDO CHOLANGIOPANCREATOGRAPH: CPT | Mod: XU,GC

## 2022-10-06 PROCEDURE — 74330 X-RAY BILE/PANC ENDOSCOPY: CPT | Mod: 26,GC

## 2022-10-06 DEVICE — STENT BIL ADVANIX 10FRX7CM PRELOADED: Type: IMPLANTABLE DEVICE | Status: FUNCTIONAL

## 2022-10-06 DEVICE — GWIRE JAGTOME REVOLUTION RX 260CM/0.025IN: Type: IMPLANTABLE DEVICE | Status: FUNCTIONAL

## 2022-10-06 RX ADMIN — BRIMONIDINE TARTRATE 1 DROP(S): 2 SOLUTION/ DROPS OPHTHALMIC at 17:45

## 2022-10-06 RX ADMIN — Medication 1 DROP(S): at 06:21

## 2022-10-06 RX ADMIN — BRIMONIDINE TARTRATE 1 DROP(S): 2 SOLUTION/ DROPS OPHTHALMIC at 06:19

## 2022-10-06 RX ADMIN — DORZOLAMIDE HYDROCHLORIDE 1 DROP(S): 20 SOLUTION/ DROPS OPHTHALMIC at 17:45

## 2022-10-06 RX ADMIN — Medication 1 DROP(S): at 17:44

## 2022-10-06 RX ADMIN — CYCLOSPORINE 1 DROP(S): 0.5 EMULSION OPHTHALMIC at 06:19

## 2022-10-06 RX ADMIN — SODIUM CHLORIDE 75 MILLILITER(S): 9 INJECTION, SOLUTION INTRAVENOUS at 00:02

## 2022-10-06 RX ADMIN — Medication 81 MILLIGRAM(S): at 17:45

## 2022-10-06 RX ADMIN — Medication 100 MILLIGRAM(S): at 17:45

## 2022-10-06 RX ADMIN — AMLODIPINE BESYLATE 5 MILLIGRAM(S): 2.5 TABLET ORAL at 06:17

## 2022-10-06 RX ADMIN — DORZOLAMIDE HYDROCHLORIDE 1 DROP(S): 20 SOLUTION/ DROPS OPHTHALMIC at 06:18

## 2022-10-06 RX ADMIN — CYCLOSPORINE 1 DROP(S): 0.5 EMULSION OPHTHALMIC at 17:44

## 2022-10-06 NOTE — DISCHARGE NOTE PROVIDER - NSDCCPCAREPLAN_GEN_ALL_CORE_FT
PRINCIPAL DISCHARGE DIAGNOSIS  Diagnosis: FORD (acute kidney injury)  Assessment and Plan of Treatment: Noted with elevated kidney function now improving after IV fluids, avoid dehydration.   Repeat BMP within 1 week      SECONDARY DISCHARGE DIAGNOSES  Diagnosis: Bradycardia  Assessment and Plan of Treatment: Noted with decreased heart rate (bradycardia), monitored closely on telemetry. You were evaluated by electrophioslogist- likely vasovagal response, *HOLD aricept given the bradycardia evidence------------------  -Avoid AV loni agents****  Outpatient followup with cardiology    Diagnosis: Acute UTI  Assessment and Plan of Treatment: You completed antibiotics for a urinary infection. To help prevent future infections maintain healthy hygiene and avoid taking long baths. Wipe from front to back and empty your bladder frequently by keeping yourself properly hydrated. Monitor for signs/symptoms of infection, such as, fever/chills, burning/pain with urination, urinary frequency/hesitancy, cloudy urine, or blood in urine and follow-up with your primary care provider as outpatient for further care.       PRINCIPAL DISCHARGE DIAGNOSIS  Diagnosis: FORD (acute kidney injury)  Assessment and Plan of Treatment: Noted with elevated kidney function now improving after IV fluids, avoid dehydration.   Repeat BMP within 1 week      SECONDARY DISCHARGE DIAGNOSES  Diagnosis: Acute UTI  Assessment and Plan of Treatment: You completed antibiotics for a urinary infection. To help prevent future infections maintain healthy hygiene and avoid taking long baths. Wipe from front to back and empty your bladder frequently by keeping yourself properly hydrated. Monitor for signs/symptoms of infection, such as, fever/chills, burning/pain with urination, urinary frequency/hesitancy, cloudy urine, or blood in urine and follow-up with your primary care provider as outpatient for further care.      Diagnosis: HLD (hyperlipidemia)  Assessment and Plan of Treatment:     Diagnosis: HTN (hypertension)  Assessment and Plan of Treatment:     Diagnosis: Bradycardia  Assessment and Plan of Treatment: You are found with with decreased heart rate (bradycardia). You were evaluated by electrophioslogist- likely vasovagal response, *HOLD aricept given the low heart rate. avoid any agent that can lower your heart rate   Outpatient followup with Dr Murillo in 1-2 weeks     PRINCIPAL DISCHARGE DIAGNOSIS  Diagnosis: FORD (acute kidney injury)  Assessment and Plan of Treatment: Noted with elevated kidney function now improving after IV fluids, avoid dehydration.   Repeat BMP within 1 week      SECONDARY DISCHARGE DIAGNOSES  Diagnosis: Acute UTI  Assessment and Plan of Treatment: You completed antibiotics for a urinary infection. To help prevent future infections maintain healthy hygiene and avoid taking long baths. Wipe from front to back and empty your bladder frequently by keeping yourself properly hydrated. Monitor for signs/symptoms of infection, such as, fever/chills, burning/pain with urination, urinary frequency/hesitancy, cloudy urine, or blood in urine and follow-up with your primary care provider as outpatient for further care.      Diagnosis: Bile duct stricture  Assessment and Plan of Treatment: you are found with common dile duct narrowing and seen bas gastroenterologist with a stent placement. you had biopsy taken during the process. please followup with Dr Fonseca in 1-2 weeks for result and continue care    Diagnosis: Bradycardia  Assessment and Plan of Treatment: You are found with with decreased heart rate (bradycardia). You were evaluated by electrophioslogist- likely vasovagal response, *HOLD aricept given the low heart rate. avoid any agent that can lower your heart rate   Outpatient followup with Dr Murillo in 1-2 weeks    Diagnosis: HLD (hyperlipidemia)  Assessment and Plan of Treatment: Low fat diet, exercise daily and continue current medications. Follow up with primary care physician and cardiologist for management.    Diagnosis: HTN (hypertension)  Assessment and Plan of Treatment: Low sodium and fat diet, continue anti-hypertensive medications, and follow up with primary care physician.

## 2022-10-06 NOTE — DISCHARGE NOTE PROVIDER - CARE PROVIDER_API CALL
Trent Murillo)  Cardiac Electrophysiology; Cardiology; Internal Medicine  [address] [place] [id number]  Phone: [phone]  Fax: [phone]  Follow Up Time:     Juwan Adams)  Gastroenterology; Internal Medicine  [address] [place] [address] [address]  [place] [address]  Phone: [phone]  Fax: [phone]  Follow Up Time:

## 2022-10-06 NOTE — PROGRESS NOTE ADULT - SUBJECTIVE AND OBJECTIVE BOX
Dr. Cami Chang  Pager [phone]    PROGRESS NOTE:     Patient is a 82y old  Female who presents with a chief complaint of Near collapse (06 Oct 2022 10:01)      SUBJECTIVE / OVERNIGHT EVENTS: pt has no specific complaints, for ERCP/EUS today  ADDITIONAL REVIEW OF SYSTEMS: afebrile     MEDICATIONS  (STANDING):  amLODIPine   Tablet 5 milliGRAM(s) Oral daily  aspirin  chewable 81 milliGRAM(s) Oral daily  brimonidine 0.2% Ophthalmic Solution 1 Drop(s) Both EYES two times a day  cycloSPORINE (RESTASIS) 0.05% Emulsion 1 Drop(s) Both EYES two times a day  dorzolamide 2% Ophthalmic Solution 1 Drop(s) Both EYES <User Schedule>  influenza  Vaccine (HIGH DOSE) 0.7 milliLiter(s) IntraMuscular once  lactated ringers. 1000 milliLiter(s) (75 mL/Hr) IV Continuous <Continuous>  thiamine 100 milliGRAM(s) Oral daily  timolol 0.5% Solution 1 Drop(s) Both EYES two times a day    MEDICATIONS  (PRN):  acetaminophen     Tablet .. 650 milliGRAM(s) Oral every 6 hours PRN Temp greater or equal to 38C (100.4F), Mild Pain (1 - 3)      CAPILLARY BLOOD GLUCOSE        I&O's Summary    05 Oct 2022 07:01  -  06 Oct 2022 07:00  --------------------------------------------------------  IN: 835 mL / OUT: 1155 mL / NET: -320 mL        PHYSICAL EXAM:  Vital Signs Last 24 Hrs  T(C): 36.2 (06 Oct 2022 09:55), Max: 36.8 (05 Oct 2022 13:00)  T(F): 97.1 (06 Oct 2022 09:55), Max: 98.2 (05 Oct 2022 13:00)  HR: 37 (06 Oct 2022 09:55) (37 - 65)  BP: 137/53 (06 Oct 2022 09:55) (137/53 - 150/63)  BP(mean): --  RR: 18 (06 Oct 2022 09:55) (17 - 18)  SpO2: 100% (06 Oct 2022 09:55) (97% - 100%)    Parameters below as of 06 Oct 2022 09:55  Patient On (Oxygen Delivery Method): room air    CONSTITUTIONAL: NAD, well-developed  RESPIRATORY: Normal respiratory effort; lungs are clear to auscultation bilaterally  CARDIOVASCULAR: Regular rate and rhythm, normal S1 and S2, no murmur/rub/gallop; No lower extremity edema; Peripheral pulses are 2+ bilaterally  ABDOMEN: soft, nt/nd, +bs, no organomegaly  MUSCULOSKELETAL: no clubbing or cyanosis of digits; no joint swelling or tenderness to palpation  PSYCH: A+O to person, place, affect appropriate  Neuro: has dementia , nonfocal, CNII-XII grossly intact       LABS:                        11.5   6.13  )-----------( 248      ( 06 Oct 2022 03:17 )             37.9     10-06    143  |  106  |  18  ----------------------------<  88  4.3   |  28  |  1.01    Ca    10.4      06 Oct 2022 03:17  Phos  3.0     10-06  Mg     2.00     10-06      PT/INR - ( 06 Oct 2022 03:17 )   PT: 12.3 sec;   INR: 1.06 ratio         PTT - ( 06 Oct 2022 03:17 )  PTT:29.0 sec        RADIOLOGY & ADDITIONAL TESTS:  Results Reviewed:   Imaging Personally Reviewed:  < from: MR MRCP w/wo IV Cont (10.05.22 @ 12:59) >  LIVER: Within normal limits.  BILE DUCTS: Common duct dilated to 12 mm with smooth tapering and   probable distal stricture at the ampulla suggesting papillary stenosis.   No intrahepatic duct dilatation.  GALLBLADDER: Within normal limits.  SPLEEN: Within normal limits.  PANCREAS: Diffuse mild dilatation main pancreatic duct with increased   dilatation in the head to 7 mm (24:22)  ADRENALS: Within normal limits.  KIDNEYS/URETERS: History right nephrectomy. Left renal cysts.    VISUALIZED PORTIONS:  BOWEL: Within normal limits.  PERITONEUM: No ascites.  VESSELS: Within normal limits.  RETROPERITONEUM/LYMPH NODES: No lymphadenopathy.  ABDOMINAL WALL: Within normal limits.  BONES: Within normal limits.    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 Cami Chang  Pager [phone]    PROGRESS NOTE:     Patient is a 82y old  Female who presents with a chief complaint of Near collapse (06 Oct 2022 10:01)      SUBJECTIVE / OVERNIGHT EVENTS: pt has no specific complaints, for ERCP/EUS today  ADDITIONAL REVIEW OF SYSTEMS: afebrile     MEDICATIONS  (STANDING):  amLODIPine   Tablet 5 milliGRAM(s) Oral daily  aspirin  chewable 81 milliGRAM(s) Oral daily  brimonidine 0.2% Ophthalmic Solution 1 Drop(s) Both EYES two times a day  cycloSPORINE (RESTASIS) 0.05% Emulsion 1 Drop(s) Both EYES two times a day  dorzolamide 2% Ophthalmic Solution 1 Drop(s) Both EYES <User Schedule>  influenza  Vaccine (HIGH DOSE) 0.7 milliLiter(s) IntraMuscular once  lactated ringers. 1000 milliLiter(s) (75 mL/Hr) IV Continuous <Continuous>  thiamine 100 milliGRAM(s) Oral daily  timolol 0.5% Solution 1 Drop(s) Both EYES two times a day    MEDICATIONS  (PRN):  acetaminophen     Tablet .. 650 milliGRAM(s) Oral every 6 hours PRN Temp greater or equal to 38C (100.4F), Mild Pain (1 - 3)      CAPILLARY BLOOD GLUCOSE        I&O's Summary    05 Oct 2022 07:01  -  06 Oct 2022 07:00  --------------------------------------------------------  IN: 835 mL / OUT: 1155 mL / NET: -320 mL        PHYSICAL EXAM:  Vital Signs Last 24 Hrs  T(C): 36.2 (06 Oct 2022 09:55), Max: 36.8 (05 Oct 2022 13:00)  T(F): 97.1 (06 Oct 2022 09:55), Max: 98.2 (05 Oct 2022 13:00)  HR: 37 (06 Oct 2022 09:55) (37 - 65)  BP: 137/53 (06 Oct 2022 09:55) (137/53 - 150/63)  BP(mean): --  RR: 18 (06 Oct 2022 09:55) (17 - 18)  SpO2: 100% (06 Oct 2022 09:55) (97% - 100%)    Parameters below as of 06 Oct 2022 09:55  Patient On (Oxygen Delivery Method): room air    CONSTITUTIONAL: NAD, well-developed  RESPIRATORY: Normal respiratory effort; lungs are clear to auscultation bilaterally  CARDIOVASCULAR: Regular rate and rhythm, normal S1 and S2, no murmur/rub/gallop; No lower extremity edema; Peripheral pulses are 2+ bilaterally  ABDOMEN: soft, nt/nd, +bs, no organomegaly  MUSCULOSKELETAL: no clubbing or cyanosis of digits; no joint swelling or tenderness to palpation  PSYCH: A+O to person, place, affect appropriate  Neuro: has dementia , nonfocal, CNII-XII grossly intact       LABS:                        11.5   6.13  )-----------( 248      ( 06 Oct 2022 03:17 )             37.9     10-06    143  |  106  |  18  ----------------------------<  88  4.3   |  28  |  1.01    Ca    10.4      06 Oct 2022 03:17  Phos  3.0     10-06  Mg     2.00     10-06      PT/INR - ( 06 Oct 2022 03:17 )   PT: 12.3 sec;   INR: 1.06 ratio         PTT - ( 06 Oct 2022 03:17 )  PTT:29.0 sec        RADIOLOGY & ADDITIONAL TESTS:  Results Reviewed:   Imaging Personally Reviewed:  < from: MR MRCP w/wo IV Cont (10.05.22 @ 12:59) >  LIVER: Within normal limits.  BILE DUCTS: Common duct dilated to 12 mm with smooth tapering and   probable distal stricture at the ampulla suggesting papillary stenosis.   No intrahepatic duct dilatation.  GALLBLADDER: Within normal limits.  SPLEEN: Within normal limits.  PANCREAS: Diffuse mild dilatation main pancreatic duct with increased   dilatation in the head to 7 mm (24:22)  ADRENALS: Within normal limits.  KIDNEYS/URETERS: History right nephrectomy. Left renal cysts.    VISUALIZED PORTIONS:  BOWEL: Within normal limits.  PERITONEUM: No ascites.  VESSELS: Within normal limits.  RETROPERITONEUM/LYMPH NODES: No lymphadenopathy.  ABDOMINAL WALL: Within normal limits.  BONES: Within normal limits.    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                       -There was a gastric nodule in the antrum that was                        biopsied.                       -The duodenal bulb and D2 were normal.                       EUS: A linear scope was used.                       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                       -The pancreas parenchyma was normal.                       ERCP:                       -The duodenoscope was passed to the ampulla.                       -The ampulla was normal.                       -The bile duct was cannulated using a preloaded                        sphincterotome.                       -Contrast was injected. There a ampullary/distal CBD                        stricture with CBD dilation above it.                       -A biliary sphincterotomy was performed.                       -The stricture was brushed.                       -A pediatric biopsy forceps was used to biopsy the                        intra-ampullary segment/distal CBD.                       -A 10Fr-7 cm straight stent was placed under endoscopic                        and fluoroscopic guidance.  Recommendation:      - Discharge patient to home (ambulatory).                       - Follow up cytology and pathology.      Electrocardiogram Personally Reviewed:    COORDINATION OF CARE:  Care Discussed with Consultants/Other Providers [Y/N]:  Prior or Outpatient Records Reviewed [Y/N]:

## 2022-10-06 NOTE — PROGRESS NOTE ADULT - PROBLEM SELECTOR PLAN 1
-generalized weakness likely 2/2 possible uti, dehydration, R abd pain  -no focal deficits, no trauma, CT pelvis w/o fracture  - pt had right sided abdominal pain, CT showed dilated pancreatic duct without pancreatic mass, GI consulted, recs MRCP (10/5) demonstrated CBD dilated to 12 mm with smooth tapering and   probable distal stricture at the ampulla suggesting papillary stenosis.   -f/u EUS +/- ERCP today  -hx of dementia with acute encephalopathy, mental status now close to baseline   CTH (01/4) mild white matter microvascular ischemic disease, no acute path  -dispo: DC plan home with home PT, RW, pending GI workup -generalized weakness likely 2/2 possible uti, dehydration, R abd pain  -no focal deficits, no trauma, CT pelvis w/o fracture  - pt had right sided abdominal pain, CT showed dilated pancreatic duct without pancreatic mass, GI consulted, recs MRCP (10/5) demonstrated CBD dilated to 12 mm with smooth tapering and   probable distal stricture at the ampulla suggesting papillary stenosis.   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 A biliary sphincterotomy was performed. The stricture was brushed. Intra-ampullary segment/distal CBD biopsied.  -hx of dementia with acute encephalopathy, mental status now close to baseline   CTH (01/4) mild white matter microvascular ischemic disease, no acute path  -dispo: DC plan home with home PT, RW, pending GI workup -generalized weakness likely 2/2 possible uti, dehydration, R abd pain  -no focal deficits, no trauma, CT pelvis w/o fracture  - pt had right sided abdominal pain, CT showed dilated pancreatic duct without pancreatic mass, GI consulted, recs MRCP (10/5) demonstrated CBD dilated to 12 mm with smooth tapering and   probable distal stricture at the ampulla suggesting papillary stenosis.   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 A biliary sphincterotomy was performed. The stricture was brushed. Intra-ampullary segment/distal CBD biopsied.  -hx of dementia with acute encephalopathy, mental status now close to baseline   CTH (01/4) mild white matter microvascular ischemic disease, no acute path  -dispo: DC plan home with home PT, RW, likely tomorrow

## 2022-10-06 NOTE — PROGRESS NOTE ADULT - PROBLEM SELECTOR PLAN 5
- trend cbc, doubt mds  - b12 1149, folate 10.5  -spot urine protein/cr ratio 0.2, no monoclonal gammopathy

## 2022-10-06 NOTE — PROGRESS NOTE ADULT - ASSESSMENT
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 GI consulted for abnormal CT.    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 Unclear relation to right sided abdominal pain. DDx includes benign vs. malignant stricture. MRCP suggestive of papillary stenosis. Plan for EUS +/- ERCP. Discussed risks vs. benefits of the procedure with patient and daughter. Risks including but not limited to bleeding, infection and perforation. All questions answered.   # Syncope - unclear etiology, workup in progress  # HTN  # R nephrectomy  # UTI    Recommendations:  - EUS+/- ERCP today    Please note that the recommendations are not final until attested by an attending.    Thank you for involving us in the care of this patient. Please reach out if any further questions.    Ivan Haynes, PGY-6  Gastroenterology/Hepatology Fellow    Available on [employer] Teams  After 5PM/Weekends, please contact the on-call GI fellow: [phone]

## 2022-10-06 NOTE — DISCHARGE NOTE PROVIDER - HOSPITAL COURSE
Patient is an 82 year old F hx of dementia, HTN, HLD, R nephrectomy as a child who presents for generalized weakness and falling due to weakness (but daughter caught her). CT pelvis w/o fracture. CTH with no findings, treated for +UA with Ceftriaxone. S/P RRT in ED for symptomatic bradycardia, EP consulted. CT showed dilated pancreatic duct without pancreatic mass, GI was consulted now s/p EUS and ERCP     Hospital Course:   AMS/Weakness s/p fall   -generalized weakness likely 2/2 uti below, back towards baseline currently  -no focal deficits, no trauma, CT pelvis w/o fracture.  -UTI s/p CTX, ucx 10/2- ntd.   -CTH -no Hydrocephalus/hemorrhage, mass, edema        -PT- home PT  -fall precautions     Abdominal pain   -pt has right abdominal pain, CT showed dilated pancreatic duct without pancreatic mass   -GI consulted   -MRCP >Dilatation common duct and main pancreatic duct likely secondary to papillary stenosis  -EUS 10/6- gastric nodule biopsied, ampullary stricture see with dilation of CBD   -ERCP 10/6- ampullary/distal CBD stricture with CBD dilation s/p biliary sphincterotomy with stent placement, FU cytology and pathology---  -hx of dementia with acute encephalopathy, improved, CTH (01/4) mild white matter microvascular ischemic disease, no acute path    Bradycardia.   -bradycardic while sleeping to 50s, goes to 80s while awake   -can monitor on tele to assure no pauses   -Q waves seen anterior leads, TTE 10/3- 69%, Minimal MR,  Normal RV/LV function.  -troponin elevation very mild, pt w/o chest pain, likely elevation 2/2 FORD   -normal cpk, troponin downtrending 80-->61  -RRT for symptomatic bradycardia w/ syncope on 10/3, EP consulted -likely vasovagal reposnse, *HOLD aricept given the myron evidence  -Baseline EKG demonstrates NSR at 80 bpm   -Avoid AV loni agents, keep Zoll pads and Atropine at bedside       FORD (acute kidney injury).   -w/ proteinuria noted, also noted bone marrow infiltration on CT a/p iv contrast, d/w daughter that this could possibly be on spectrum of MDS  -obtain SPEP/UPEP, and 24 hour protein (no edema noted on LE though lower concern for nephrotic syndrome)  -repeat u/a w/ microscopic reflex   -s/p 1 L fluids reassess in am creatinine level   -has hx of right nephrectomy so monitor urine closely, no hydro on CT, left kidney mild  hypertrophy, L renal cyst.    Leukocytosis.   -likely 2/2 UTI tx as mentioned below.    Proteinuria.   -as above, concern MDS given CT findings  -Hg and platelets wnl  -may need outpt heme eval based on labwork findings  - b12 1149, folate 10.5  -spot urine prot/cr ratio noted, free light chains--    Acute UTI.   -UA neg for nitrite/LE, wbc 21, rbc 6, few bacteria.  -s/p ceftriaxone.  -UCX 10/2- ntd    Dementia.   -meds rec done with family  -takes aricept 10 mg at home.> hold per EP     HTN (hypertension).   -clarified with family, takes losartan 50mg qd at home  -hold arb for now.    HLD (hyperlipidemia).   -takes rosuvastatin at home (allergic to lipitor- rash)     Dispo: home PT      On_________, case was discussed with __________, patient is medically cleared and optimized for discharge today. All medications were reviewed with attending, and sent to mutually agreed upon pharmacy.   Patient is an 82 year old F hx of dementia, HTN, HLD, R nephrectomy as a child who presents for generalized weakness and falling due to weakness (but daughter caught her). CT pelvis w/o fracture. CTH with no findings, treated for +UA with Ceftriaxone. S/P RRT in ED for symptomatic bradycardia, EP consulted. CT showed dilated pancreatic duct without pancreatic mass    Hospital Course:     AMS/Weakness s/p fall   -generalized weakness likely 2/2 uti below, now back to baseline   -no focal deficits, no trauma, CT pelvis w/o fracture.  -UTI s/p CTX, ucx 10/2- ntd.   -CTH -no Hydrocephalus/hemorrhage, mass, edema        -PT- home PT    Abdominal pain   -pt has right abdominal pain, CT showed dilated pancreatic duct without pancreatic mass   -MRCP >Dilatation common duct and main pancreatic duct likely secondary to papillary stenosis  -EUS 10/6- gastric nodule biopsied, ampullary stricture see with dilation of CBD   -ERCP 10/6- ampullary/distal CBD stricture with CBD dilation s/p biliary sphincterotomy with stent placement  - FU cytology and pathology with gastroenterology    hx of dementia with acute encephalopathy  - improved, CTH (01/4) mild white matter microvascular ischemic disease, no acute path    Bradycardia/RRT for symptomatic bradycardia w/ syncope on 10/3  -bradycardic while sleeping to 50s, goes to 80s while awake   -TTE 10/3- 69%, Minimal MR,  Normal RV/LV function.  -troponin elevation very mild, pt w/o chest pain, likely elevation 2/2 FORD   -normal cpk  - EP -likely vasovagal reposnse, *HOLD aricept given the myron evidence  -Baseline EKG demonstrates NSR at 80 bpm   -Avoid AV loni agents    FORD (acute kidney injury) with proteinuria and CT with heterogenous marrow density in right iliac wing and body ? metabolic disorder  - likely due to dehydration, mild proteinuria likely due to solitary L kidney  - no monoclonal gammopathy on SPEP, kappa/lambda ratio 1.5 (wnl)  - has hx of right nephrectomy so monitor urine closely, no hydro on CT, left kidney mild  hypertrophy, L renal cyst.    Leukocytosis/UTI  -likely 2/2 UTI  -UA neg for nitrite/LE, wbc 21, rbc 6, few bacteria.  -s/p ceftriaxone.  -UCX 10/2- ntd    Dementia.   -takes aricept 10 mg at home.> hold per EP     HTN (hypertension).   - losartan     HLD (hyperlipidemia).   -takes rosuvastatin at home (allergic to lipitor- rash)      Dispo: home PT      On 10/7, case was discussed with , patient is medically cleared and optimized for discharge today. All medications were reviewed with attending, and sent to mutually agreed upon pharmacy.

## 2022-10-06 NOTE — PROGRESS NOTE ADULT - PROBLEM SELECTOR PLAN 2
-bradycardic while sleeping to 50s, goes to 80s while awake  -tele monitor   -Q waves seen anterior leads, daughter reports hx prior MI  -troponin elevation very mild, denies chest pain, likely elevation 2/2 FORD   nml cpk, troponin downtrending 80-->61-->42  - TTE (10/3) Normal LV/RV function, EF 69%, No RWMA  -pt had a acute encephalopathy with RRT 10/3 when she went for echo with brief period of unresponsiveness, bradycardic, ?vasovagal episode, EP consulted, no indication for pacer, Aricept dc'd  - EP f/u appreciated

## 2022-10-06 NOTE — DISCHARGE NOTE PROVIDER - NSDCMRMEDTOKEN_GEN_ALL_CORE_FT
Combigan 0.2%-0.5% ophthalmic solution: 1 drop(s) to each affected eye every 12 hours (home med)  donepezil 10 mg oral tablet: 1 tab(s) orally once a day (at bedtime)  dorzolamide 2% ophthalmic solution: 1 drop(s) to each affected eye 2 times a day (home med)  losartan 50 mg oral tablet: 1 tab(s) orally once a day  Restasis 0.05% ophthalmic emulsion: 1 drop(s) in each eye every 12 hours  rosuvastatin 20 mg oral tablet: 1 tab(s) orally once a day (at bedtime)   amLODIPine 5 mg oral tablet: 1 tab(s) orally once a day  aspirin 81 mg oral tablet, chewable: 1 tab(s) orally once a day  Combigan 0.2%-0.5% ophthalmic solution: 1 drop(s) to each affected eye every 12 hours (home med)  dorzolamide 2% ophthalmic solution: 1 drop(s) to each affected eye 2 times a day (home med)  guaiFENesin 100 mg/5 mL oral liquid: 5 milliliter(s) orally every 6 hours, As needed, Cough  losartan 25 mg oral tablet: 1 tab(s) orally once a day  Restasis 0.05% ophthalmic emulsion: 1 drop(s) in each eye every 12 hours  rosuvastatin 20 mg oral tablet: 1 tab(s) orally once a day (at bedtime)  thiamine 100 mg oral tablet: 1 tab(s) orally once a day

## 2022-10-06 NOTE — DISCHARGE NOTE PROVIDER - CARE PROVIDERS DIRECT ADDRESSES
[email or web address]

## 2022-10-06 NOTE — PROGRESS NOTE ADULT - SUBJECTIVE AND OBJECTIVE BOX
Interval Events:   - No acute events  - Planned for EUS today    Allergies:  Lipitor (Rash)        Hospital Medications:  acetaminophen     Tablet .. 650 milliGRAM(s) Oral every 6 hours PRN  amLODIPine   Tablet 5 milliGRAM(s) Oral daily  aspirin  chewable 81 milliGRAM(s) Oral daily  brimonidine 0.2% Ophthalmic Solution 1 Drop(s) Both EYES two times a day  cycloSPORINE (RESTASIS) 0.05% Emulsion 1 Drop(s) Both EYES two times a day  dorzolamide 2% Ophthalmic Solution 1 Drop(s) Both EYES <User Schedule>  influenza  Vaccine (HIGH DOSE) 0.7 milliLiter(s) IntraMuscular once  lactated ringers. 1000 milliLiter(s) IV Continuous <Continuous>  thiamine 100 milliGRAM(s) Oral daily  timolol 0.5% Solution 1 Drop(s) Both EYES two times a day      PMHX/PSHX:  Dementia    HTN (hypertension)    HLD (hyperlipidemia)    H/O right nephrectomy        Family history:  FH: HTN (hypertension) (Father)        ROS: As per HPI, otherwise 14-point ROS reviewed and negative.      PHYSICAL EXAM:   Vital Signs:  Vital Signs Last 24 Hrs  T(C): 36.2 (06 Oct 2022 09:55), Max: 36.8 (05 Oct 2022 13:00)  T(F): 97.1 (06 Oct 2022 09:55), Max: 98.2 (05 Oct 2022 13:00)  HR: 37 (06 Oct 2022 09:55) (37 - 65)  BP: 137/53 (06 Oct 2022 09:55) (137/53 - 150/63)  BP(mean): --  RR: 18 (06 Oct 2022 09:55) (17 - 18)  SpO2: 100% (06 Oct 2022 09:55) (97% - 100%)    Parameters below as of 06 Oct 2022 06:09  Patient On (Oxygen Delivery Method): room air      Daily Height in cm: 165.1 (06 Oct 2022 09:55)    Daily       10-05-22 @ 07:01  -  10-06-22 @ 07:00  --------------------------------------------------------  IN: 835 mL / OUT: 1155 mL / NET: -320 mL      GENERAL:  No acute distress  HEENT:  Normocephalic/atraumatic, no scleral icterus  CHEST:  No accessory muscle use  HEART:  Regular rate and rhythm  ABDOMEN:  Soft, non-tender, non-distended  EXTREMITIES: No cyanosis, clubbing, or edema  SKIN:  No rash  NEURO:  Alert and oriented x 3, no asterixis      LABS:                        11.5   6.13  )-----------( 248      ( 06 Oct 2022 03:17 )             37.9     Mean Cell Volume: 92.2 fL (10-06-22 @ 03:17)    10-06    143  |  106  |  18  ----------------------------<  88  4.3   |  28  |  1.01    Ca    10.4      06 Oct 2022 03:17  Phos  3.0     10-06  Mg     2.00     10-06        PT/INR - ( 06 Oct 2022 03:17 )   PT: 12.3 sec;   INR: 1.06 ratio         PTT - ( 06 Oct 2022 03:17 )  PTT:29.0 sec                            11.5   6.13  )-----------( 248      ( 06 Oct 2022 03:17 )             37.9                         12.1   6.50  )-----------( 219      ( 05 Oct 2022 05:18 )             40.3                         12.0   6.52  )-----------( 218      ( 04 Oct 2022 05:44 )             38.9                         12.9   9.63  )-----------( 218      ( 03 Oct 2022 11:45 )             40.9       Imaging:

## 2022-10-06 NOTE — DISCHARGE NOTE PROVIDER - ATTENDING ATTESTATION STATEMENT
I have personally seen and examined the patient. I have collaborated with and supervised the
11-Jul-2019 10:53

## 2022-10-07 ENCOUNTER — TRANSCRIPTION ENCOUNTER (OUTPATIENT)
Age: 82
End: 2022-10-07

## 2022-10-07 VITALS
RESPIRATION RATE: 18 BRPM | HEART RATE: 57 BPM | DIASTOLIC BLOOD PRESSURE: 61 MMHG | SYSTOLIC BLOOD PRESSURE: 137 MMHG | TEMPERATURE: 98 F | OXYGEN SATURATION: 100 %

## 2022-10-07 LAB
ANION GAP SERPL CALC-SCNC: 9 MMOL/L — SIGNIFICANT CHANGE UP (ref 7–14)
BUN SERPL-MCNC: 20 MG/DL — SIGNIFICANT CHANGE UP (ref 7–23)
CALCIUM SERPL-MCNC: 10.4 MG/DL — SIGNIFICANT CHANGE UP (ref 8.4–10.5)
CHLORIDE SERPL-SCNC: 105 MMOL/L — SIGNIFICANT CHANGE UP (ref 98–107)
CO2 SERPL-SCNC: 27 MMOL/L — SIGNIFICANT CHANGE UP (ref 22–31)
CREAT SERPL-MCNC: 0.97 MG/DL — SIGNIFICANT CHANGE UP (ref 0.5–1.3)
EGFR: 58 ML/MIN/1.73M2 — LOW
GLUCOSE SERPL-MCNC: 101 MG/DL — HIGH (ref 70–99)
HCT VFR BLD CALC: 39.6 % — SIGNIFICANT CHANGE UP (ref 34.5–45)
HGB BLD-MCNC: 12.1 G/DL — SIGNIFICANT CHANGE UP (ref 11.5–15.5)
INTERPRETATION SERPL IFE-IMP: SIGNIFICANT CHANGE UP
MAGNESIUM SERPL-MCNC: 1.9 MG/DL — SIGNIFICANT CHANGE UP (ref 1.6–2.6)
MCHC RBC-ENTMCNC: 28.3 PG — SIGNIFICANT CHANGE UP (ref 27–34)
MCHC RBC-ENTMCNC: 30.6 GM/DL — LOW (ref 32–36)
MCV RBC AUTO: 92.5 FL — SIGNIFICANT CHANGE UP (ref 80–100)
NRBC # BLD: 0 /100 WBCS — SIGNIFICANT CHANGE UP (ref 0–0)
NRBC # FLD: 0 K/UL — SIGNIFICANT CHANGE UP (ref 0–0)
PHOSPHATE SERPL-MCNC: 3.3 MG/DL — SIGNIFICANT CHANGE UP (ref 2.5–4.5)
PLATELET # BLD AUTO: 231 K/UL — SIGNIFICANT CHANGE UP (ref 150–400)
POTASSIUM SERPL-MCNC: 4.7 MMOL/L — SIGNIFICANT CHANGE UP (ref 3.5–5.3)
POTASSIUM SERPL-SCNC: 4.7 MMOL/L — SIGNIFICANT CHANGE UP (ref 3.5–5.3)
RBC # BLD: 4.28 M/UL — SIGNIFICANT CHANGE UP (ref 3.8–5.2)
RBC # FLD: 12.7 % — SIGNIFICANT CHANGE UP (ref 10.3–14.5)
SODIUM SERPL-SCNC: 141 MMOL/L — SIGNIFICANT CHANGE UP (ref 135–145)
WBC # BLD: 8.78 K/UL — SIGNIFICANT CHANGE UP (ref 3.8–10.5)
WBC # FLD AUTO: 8.78 K/UL — SIGNIFICANT CHANGE UP (ref 3.8–10.5)

## 2022-10-07 PROCEDURE — 99239 HOSP IP/OBS DSCHRG MGMT >30: CPT

## 2022-10-07 PROCEDURE — 99232 SBSQ HOSP IP/OBS MODERATE 35: CPT | Mod: GC

## 2022-10-07 RX ORDER — THIAMINE MONONITRATE (VIT B1) 100 MG
1 TABLET ORAL
Qty: 30 | Refills: 0
Start: 2022-10-07 | End: 2022-11-05

## 2022-10-07 RX ORDER — ASPIRIN/CALCIUM CARB/MAGNESIUM 324 MG
1 TABLET ORAL
Qty: 0 | Refills: 0 | DISCHARGE
Start: 2022-10-07

## 2022-10-07 RX ORDER — LOSARTAN POTASSIUM 100 MG/1
1 TABLET, FILM COATED ORAL
Qty: 0 | Refills: 0 | DISCHARGE

## 2022-10-07 RX ORDER — LOSARTAN POTASSIUM 100 MG/1
1 TABLET, FILM COATED ORAL
Qty: 30 | Refills: 0
Start: 2022-10-07 | End: 2022-11-05

## 2022-10-07 RX ORDER — AMLODIPINE BESYLATE 2.5 MG/1
1 TABLET ORAL
Qty: 30 | Refills: 0
Start: 2022-10-07 | End: 2022-11-05

## 2022-10-07 RX ORDER — LOSARTAN POTASSIUM 100 MG/1
25 TABLET, FILM COATED ORAL DAILY
Refills: 0 | Status: DISCONTINUED | OUTPATIENT
Start: 2022-10-07 | End: 2022-10-07

## 2022-10-07 RX ORDER — DONEPEZIL HYDROCHLORIDE 10 MG/1
1 TABLET, FILM COATED ORAL
Qty: 0 | Refills: 0 | DISCHARGE

## 2022-10-07 RX ADMIN — Medication 81 MILLIGRAM(S): at 12:13

## 2022-10-07 RX ADMIN — Medication 100 MILLIGRAM(S): at 15:08

## 2022-10-07 RX ADMIN — CYCLOSPORINE 1 DROP(S): 0.5 EMULSION OPHTHALMIC at 05:21

## 2022-10-07 RX ADMIN — DORZOLAMIDE HYDROCHLORIDE 1 DROP(S): 20 SOLUTION/ DROPS OPHTHALMIC at 05:19

## 2022-10-07 RX ADMIN — BRIMONIDINE TARTRATE 1 DROP(S): 2 SOLUTION/ DROPS OPHTHALMIC at 05:20

## 2022-10-07 RX ADMIN — Medication 100 MILLIGRAM(S): at 12:13

## 2022-10-07 RX ADMIN — Medication 1 DROP(S): at 05:20

## 2022-10-07 RX ADMIN — AMLODIPINE BESYLATE 5 MILLIGRAM(S): 2.5 TABLET ORAL at 05:23

## 2022-10-07 RX ADMIN — LOSARTAN POTASSIUM 25 MILLIGRAM(S): 100 TABLET, FILM COATED ORAL at 12:24

## 2022-10-07 NOTE — PROGRESS NOTE ADULT - PROVIDER SPECIALTY LIST ADULT
Electrophysiology
Gastroenterology
Hospitalist
Electrophysiology
Electrophysiology
Gastroenterology
Hospitalist
Hospitalist

## 2022-10-07 NOTE — PROGRESS NOTE ADULT - REASON FOR ADMISSION
Near collapse
Near collapse
Near syncope
Near collapse

## 2022-10-07 NOTE — PROGRESS NOTE ADULT - PROBLEM SELECTOR PLAN 4
-poss uti, completed 3 days of ceftriaxone  -urine cx no growth
-poss uti, completed 3 days of ceftriaxone  -urine cx no growth
-poss uti, on ceftriaxone, dc after 3 doses  -urine cx no growth
-likely 2/2 UTI tx as mentioned below
-poss uti, completed 3 days of ceftriaxone  -urine cx no growth

## 2022-10-07 NOTE — PROGRESS NOTE ADULT - PROBLEM SELECTOR PLAN 1
-generalized weakness likely 2/2 possible uti, dehydration, R abd pain, RESOLVED  - pt had right sided abdominal pain, CT showed dilated pancreatic duct without pancreatic mass, GI consulted, recs MRCP (10/5) demonstrated CBD dilated to 12 mm with smooth tapering and   probable distal stricture at the ampulla suggesting papillary stenosis.   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 A biliary sphincterotomy was performed. The stricture was brushed. Intra-ampullary segment/distal CBD biopsied.  -hx of dementia with acute encephalopathy, mental status now close to baseline   CTH (01/4) mild white matter microvascular ischemic disease, no acute path  -Dispo: discharge home today with home PT, rolling walker. Updated daughter at bedside.  PT f/u for stair training prior to DC. Outpt f/u GI Dr. Adams and EP Dr. Murillo.

## 2022-10-07 NOTE — PROGRESS NOTE ADULT - ATTENDING COMMENTS
Patient seen and examined with the GI fellow. I agree with the above assessment and plan. Thank you for allowing us to care for your patient.    Plan for EUS+/- ERCP. I discussed the r/b/a of the procedure with the pt and daughter.
Double Duct sign with normal liver enzymes/bili s/p ERCP with brush cytology, biopsies and stent placement. Follow up cytology and path.

## 2022-10-07 NOTE — PROGRESS NOTE ADULT - PROBLEM SELECTOR PROBLEM 3
FORD (acute kidney injury)

## 2022-10-07 NOTE — PROGRESS NOTE ADULT - PROBLEM SELECTOR PLAN 6
-f/u urine cx, started on ceftriaxone  UA neg for nitrite/LE, wbc 21, rbc 6, few bacteria
UA neg for nitrite/LE, wbc 21, rbc 6, few bacteria  urine cx neg, completed 3 doses of CTX
UA neg for nitrite/LE, wbc 21, rbc 6, few bacteria  urine cx neg, completed 3 doses of CTX
started on ceftriaxone  UA neg for nitrite/LE, wbc 21, rbc 6, few bacteria  urine cx neg, complete 3 doses
UA neg for nitrite/LE, wbc 21, rbc 6, few bacteria  urine cx neg, completed 3 doses of CTX

## 2022-10-07 NOTE — PROGRESS NOTE ADULT - SUBJECTIVE AND OBJECTIVE BOX
Interval Events:   - s/p EUS/ERCP (See report)  - This AM doing well    Allergies:  Lipitor (Rash)      Hospital Medications:  acetaminophen     Tablet .. 650 milliGRAM(s) Oral every 6 hours PRN  amLODIPine   Tablet 5 milliGRAM(s) Oral daily  aspirin  chewable 81 milliGRAM(s) Oral daily  brimonidine 0.2% Ophthalmic Solution 1 Drop(s) Both EYES two times a day  cycloSPORINE (RESTASIS) 0.05% Emulsion 1 Drop(s) Both EYES two times a day  dorzolamide 2% Ophthalmic Solution 1 Drop(s) Both EYES <User Schedule>  influenza  Vaccine (HIGH DOSE) 0.7 milliLiter(s) IntraMuscular once  lactated ringers. 1000 milliLiter(s) IV Continuous <Continuous>  losartan 25 milliGRAM(s) Oral daily  thiamine 100 milliGRAM(s) Oral daily  timolol 0.5% Solution 1 Drop(s) Both EYES two times a day      PMHX/PSHX:  Dementia    HTN (hypertension)    HLD (hyperlipidemia)    H/O right nephrectomy        Family history:  FH: HTN (hypertension) (Father)        ROS: As per HPI, otherwise 14-point ROS reviewed and negative.      PHYSICAL EXAM:   Vital Signs:  Vital Signs Last 24 Hrs  T(C): 37 (07 Oct 2022 10:00), Max: 37.1 (07 Oct 2022 05:20)  T(F): 98.6 (07 Oct 2022 10:00), Max: 98.8 (07 Oct 2022 05:20)  HR: 55 (07 Oct 2022 10:00) (55 - 60)  BP: 137/60 (07 Oct 2022 10:00) (135/55 - 163/62)  BP(mean): --  RR: 16 (07 Oct 2022 10:00) (16 - 18)  SpO2: 100% (07 Oct 2022 10:00) (98% - 100%)    Parameters below as of 07 Oct 2022 10:00  Patient On (Oxygen Delivery Method): room air      Daily     Daily       10-06-22 @ 07:01  -  10-07-22 @ 07:00  --------------------------------------------------------  IN: 580 mL / OUT: 2100 mL / NET: -1520 mL        GENERAL:  No acute distress  HEENT:  Normocephalic/atraumtic,  no scleral icterus  CHEST:  No accessory muscle use  HEART:  Regular rate and rhythm  ABDOMEN:  Soft, non-tender, non-distended, normoactive bowel sounds, no masses, no hepato-splenomegaly, no signs of chronic liver disease  EXTREMITIES:  No cyanosis, clubbing, or edema  SKIN:  No rash  NEURO:  Alert and oriented x 3, no asterixis, no tremor    LABS:                        12.1   8.78  )-----------( 231      ( 07 Oct 2022 05:34 )             39.6     Mean Cell Volume: 92.5 fL (10-07-22 @ 05:34)    10-07    141  |  105  |  20  ----------------------------<  101<H>  4.7   |  27  |  0.97    Ca    10.4      07 Oct 2022 05:34  Phos  3.3     10-07  Mg     1.90     10-07        PT/INR - ( 06 Oct 2022 03:17 )   PT: 12.3 sec;   INR: 1.06 ratio         PTT - ( 06 Oct 2022 03:17 )  PTT:29.0 sec                            12.1   8.78  )-----------( 231      ( 07 Oct 2022 05:34 )             39.6                         11.5   6.13  )-----------( 248      ( 06 Oct 2022 03:17 )             37.9                         12.1   6.50  )-----------( 219      ( 05 Oct 2022 05:18 )             40.3       Imaging:

## 2022-10-07 NOTE — PROGRESS NOTE ADULT - ASSESSMENT
83yo F with a PMH of HTN, HLD, mild dementia, R nephrectomy (as a child unknown etiology) who presents  after a fall. GI consulted for abnormal CT.    # Double duct sign - s/p EUS/ERCP with CBD stricture, now with stent placed. Biopsies pending.  # Syncope - unclear etiology, workup unrevealing  # HTN  # R nephrectomy  # UTI    Recommendations:  - Follow up cytology and biopsy results  - No objections to discharge and outpatient followup    Please note that the recommendations are not final until attested by an attending.    Thank you for involving us in the care of this patient. Please reach out if any further questions.    Ivan Haynes, PGY-6  Gastroenterology/Hepatology Fellow    Available on [employer] Teams  After 5PM/Weekends, please contact the on-call GI fellow: [phone]

## 2022-10-07 NOTE — PROGRESS NOTE ADULT - PROBLEM SELECTOR PLAN 10
F-s/p 1 L NS in ED  E-replete prn  N-dysphagia diet  heparin subQ DVT prophylaxis
F-s/p 1 L NS in ED  E-replete prn  N-dysphagia diet  heparin subQ DVT prophylaxis    Updated pt's granddaughter at bedside.

## 2022-10-07 NOTE — PROGRESS NOTE ADULT - SUBJECTIVE AND OBJECTIVE BOX
Dr. Cami Chang  Pager [phone]    PROGRESS NOTE:     Patient is a 82y old  Female who presents with a chief complaint of Near collapse (06 Oct 2022 19:07)      SUBJECTIVE / OVERNIGHT EVENTS: pt denies chest pain or sob   ADDITIONAL REVIEW OF SYSTEMS: afebrile, daughter at bedside, states she has 10 steps from street to house, wants stair training before discharge home today    MEDICATIONS  (STANDING):  amLODIPine   Tablet 5 milliGRAM(s) Oral daily  aspirin  chewable 81 milliGRAM(s) Oral daily  brimonidine 0.2% Ophthalmic Solution 1 Drop(s) Both EYES two times a day  cycloSPORINE (RESTASIS) 0.05% Emulsion 1 Drop(s) Both EYES two times a day  dorzolamide 2% Ophthalmic Solution 1 Drop(s) Both EYES <User Schedule>  influenza  Vaccine (HIGH DOSE) 0.7 milliLiter(s) IntraMuscular once  lactated ringers. 1000 milliLiter(s) (75 mL/Hr) IV Continuous <Continuous>  losartan 25 milliGRAM(s) Oral daily  thiamine 100 milliGRAM(s) Oral daily  timolol 0.5% Solution 1 Drop(s) Both EYES two times a day    MEDICATIONS  (PRN):  acetaminophen     Tablet .. 650 milliGRAM(s) Oral every 6 hours PRN Temp greater or equal to 38C (100.4F), Mild Pain (1 - 3)      CAPILLARY BLOOD GLUCOSE      POCT Blood Glucose.: 95 mg/dL (07 Oct 2022 07:48)    I&O's Summary    06 Oct 2022 07:01  -  07 Oct 2022 07:00  --------------------------------------------------------  IN: 580 mL / OUT: 2100 mL / NET: -1520 mL        PHYSICAL EXAM:  Vital Signs Last 24 Hrs  T(C): 37 (07 Oct 2022 10:00), Max: 37.1 (07 Oct 2022 05:20)  T(F): 98.6 (07 Oct 2022 10:00), Max: 98.8 (07 Oct 2022 05:20)  HR: 55 (07 Oct 2022 10:00) (55 - 66)  BP: 137/60 (07 Oct 2022 10:00) (135/55 - 163/73)  BP(mean): 94 (06 Oct 2022 12:10) (93 - 95)  RR: 16 (07 Oct 2022 10:00) (13 - 19)  SpO2: 100% (07 Oct 2022 10:00) (98% - 100%)    Parameters below as of 07 Oct 2022 10:00  Patient On (Oxygen Delivery Method): room air        CONSTITUTIONAL: NAD, well-developed  RESPIRATORY: Normal respiratory effort; lungs are clear to auscultation bilaterally  CARDIOVASCULAR: Regular rate and rhythm, normal S1 and S2, no murmur/rub/gallop; No lower extremity edema; Peripheral pulses are 2+ bilaterally  ABDOMEN: soft, nt/nd, +bs, no organomegaly  MUSCULOSKELETAL: no clubbing or cyanosis of digits; no joint swelling or tenderness to palpation  PSYCH: A+O to person, place, affect appropriate  Neuro: has dementia , nonfocal, CNII-XII grossly intact       LABS:                        12.1   8.78  )-----------( 231      ( 07 Oct 2022 05:34 )             39.6     10-07    141  |  105  |  20  ----------------------------<  101<H>  4.7   |  27  |  0.97    Ca    10.4      07 Oct 2022 05:34  Phos  3.3     10-07  Mg     1.90     10-07      PT/INR - ( 06 Oct 2022 03:17 )   PT: 12.3 sec;   INR: 1.06 ratio         PTT - ( 06 Oct 2022 03:17 )  PTT:29.0 sec            RADIOLOGY & ADDITIONAL TESTS:  Results Reviewed:   Imaging Personally Reviewed:  Electrocardiogram Personally Reviewed:    COORDINATION OF CARE:  Care Discussed with Consultants/Other Providers [Y/N]: jo Lainez dc home today with home PT, rolling walker, PT f/u for stair training, losartan 25 qd, outpt f/u EP and GI  Prior or Outpatient Records Reviewed [Y/N]:

## 2022-10-07 NOTE — DISCHARGE NOTE NURSING/CASE MANAGEMENT/SOCIAL WORK - PATIENT PORTAL LINK FT
You can access the FollowMyHealth Patient Portal offered by [facility] by registering at the following website: [email or web address]. By joining [facility]’s FollowMyHealth portal, you will also be able to view your health information using other applications (apps) compatible with our system.

## 2022-10-07 NOTE — PROGRESS NOTE ADULT - PROBLEM/PLAN-8
DISPLAY PLAN FREE TEXT
none

## 2022-10-07 NOTE — DISCHARGE NOTE NURSING/CASE MANAGEMENT/SOCIAL WORK - NSDCPEFALRISK_GEN_ALL_CORE
For information on Fall & Injury Prevention, visit: [email or web address] OR  [email or web address] OR  https://www.cdc.gov/steadi/patient.html

## 2022-10-07 NOTE — PROGRESS NOTE ADULT - PROBLEM SELECTOR PLAN 2
-bradycardic to 40-50's   -tele monitor   -Q waves seen anterior leads, daughter reports hx prior MI  -troponin elevation very mild, denies chest pain, likely elevation 2/2 FORD   nml cpk, troponin downtrending 80-->61-->42  - TTE (10/3) Normal LV/RV function, EF 69%, No RWMA  -pt had a acute encephalopathy with RRT 10/3 when she went for echo with brief period of unresponsiveness, bradycardic, ?vasovagal episode, EP consulted, no indication for pacer, Aricept dc'd  - outpt f/u EP Dr. Murillo

## 2022-10-18 ENCOUNTER — NON-APPOINTMENT (OUTPATIENT)
Age: 82
End: 2022-10-18

## 2022-10-18 PROBLEM — Z00.00 ENCOUNTER FOR PREVENTIVE HEALTH EXAMINATION: Status: ACTIVE | Noted: 2022-10-18

## 2022-10-19 ENCOUNTER — NON-APPOINTMENT (OUTPATIENT)
Age: 82
End: 2022-10-19

## 2022-11-23 PROBLEM — I10 ESSENTIAL (PRIMARY) HYPERTENSION: Chronic | Status: ACTIVE | Noted: 2022-10-02

## 2022-11-23 PROBLEM — F03.90 UNSPECIFIED DEMENTIA, UNSPECIFIED SEVERITY, WITHOUT BEHAVIORAL DISTURBANCE, PSYCHOTIC DISTURBANCE, MOOD DISTURBANCE, AND ANXIETY: Chronic | Status: ACTIVE | Noted: 2022-10-02

## 2022-11-23 PROBLEM — E78.5 HYPERLIPIDEMIA, UNSPECIFIED: Chronic | Status: ACTIVE | Noted: 2022-10-02

## 2022-11-23 PROBLEM — F03.90 UNSPECIFIED DEMENTIA WITHOUT BEHAVIORAL DISTURBANCE: Chronic | Status: ACTIVE | Noted: 2022-10-02

## 2023-01-11 ENCOUNTER — OUTPATIENT (OUTPATIENT)
Dept: OUTPATIENT SERVICES | Facility: HOSPITAL | Age: 83
LOS: 1 days | End: 2023-01-11
Payer: MEDICARE

## 2023-01-11 ENCOUNTER — APPOINTMENT (OUTPATIENT)
Dept: MRI IMAGING | Facility: CLINIC | Age: 83
End: 2023-01-11
Payer: MEDICARE

## 2023-01-11 DIAGNOSIS — Z90.5 ACQUIRED ABSENCE OF KIDNEY: Chronic | ICD-10-CM

## 2023-01-11 DIAGNOSIS — K83.1 OBSTRUCTION OF BILE DUCT: ICD-10-CM

## 2023-01-11 PROCEDURE — 74183 MRI ABD W/O CNTR FLWD CNTR: CPT | Mod: 26,MH

## 2023-01-11 PROCEDURE — A9585: CPT

## 2023-01-11 PROCEDURE — 74183 MRI ABD W/O CNTR FLWD CNTR: CPT

## 2023-01-18 ENCOUNTER — OUTPATIENT (OUTPATIENT)
Dept: OUTPATIENT SERVICES | Facility: HOSPITAL | Age: 83
LOS: 1 days | End: 2023-01-18
Payer: MEDICARE

## 2023-01-18 VITALS
HEIGHT: 64 IN | DIASTOLIC BLOOD PRESSURE: 88 MMHG | OXYGEN SATURATION: 98 % | RESPIRATION RATE: 15 BRPM | HEART RATE: 64 BPM | SYSTOLIC BLOOD PRESSURE: 171 MMHG | TEMPERATURE: 98 F | WEIGHT: 160.06 LBS

## 2023-01-18 DIAGNOSIS — Z90.5 ACQUIRED ABSENCE OF KIDNEY: Chronic | ICD-10-CM

## 2023-01-18 DIAGNOSIS — K83.1 OBSTRUCTION OF BILE DUCT: ICD-10-CM

## 2023-01-18 DIAGNOSIS — Z98.890 OTHER SPECIFIED POSTPROCEDURAL STATES: Chronic | ICD-10-CM

## 2023-01-18 DIAGNOSIS — Z91.89 OTHER SPECIFIED PERSONAL RISK FACTORS, NOT ELSEWHERE CLASSIFIED: ICD-10-CM

## 2023-01-18 DIAGNOSIS — H40.9 UNSPECIFIED GLAUCOMA: ICD-10-CM

## 2023-01-18 DIAGNOSIS — I10 ESSENTIAL (PRIMARY) HYPERTENSION: ICD-10-CM

## 2023-01-18 LAB
ALBUMIN SERPL ELPH-MCNC: 3.9 G/DL — SIGNIFICANT CHANGE UP (ref 3.3–5)
ALP SERPL-CCNC: 73 U/L — SIGNIFICANT CHANGE UP (ref 40–120)
ALT FLD-CCNC: 9 U/L — SIGNIFICANT CHANGE UP (ref 4–33)
ANION GAP SERPL CALC-SCNC: 6 MMOL/L — LOW (ref 7–14)
AST SERPL-CCNC: 20 U/L — SIGNIFICANT CHANGE UP (ref 4–32)
BILIRUB SERPL-MCNC: 0.3 MG/DL — SIGNIFICANT CHANGE UP (ref 0.2–1.2)
BUN SERPL-MCNC: 15 MG/DL — SIGNIFICANT CHANGE UP (ref 7–23)
CALCIUM SERPL-MCNC: 10.7 MG/DL — HIGH (ref 8.4–10.5)
CHLORIDE SERPL-SCNC: 105 MMOL/L — SIGNIFICANT CHANGE UP (ref 98–107)
CO2 SERPL-SCNC: 28 MMOL/L — SIGNIFICANT CHANGE UP (ref 22–31)
CREAT SERPL-MCNC: 0.87 MG/DL — SIGNIFICANT CHANGE UP (ref 0.5–1.3)
EGFR: 66 ML/MIN/1.73M2 — SIGNIFICANT CHANGE UP
GLUCOSE SERPL-MCNC: 78 MG/DL — SIGNIFICANT CHANGE UP (ref 70–99)
HCT VFR BLD CALC: 37.8 % — SIGNIFICANT CHANGE UP (ref 34.5–45)
HGB BLD-MCNC: 11.6 G/DL — SIGNIFICANT CHANGE UP (ref 11.5–15.5)
MCHC RBC-ENTMCNC: 27.7 PG — SIGNIFICANT CHANGE UP (ref 27–34)
MCHC RBC-ENTMCNC: 30.7 GM/DL — LOW (ref 32–36)
MCV RBC AUTO: 90.2 FL — SIGNIFICANT CHANGE UP (ref 80–100)
NRBC # BLD: 0 /100 WBCS — SIGNIFICANT CHANGE UP (ref 0–0)
NRBC # FLD: 0 K/UL — SIGNIFICANT CHANGE UP (ref 0–0)
PLATELET # BLD AUTO: 237 K/UL — SIGNIFICANT CHANGE UP (ref 150–400)
POTASSIUM SERPL-MCNC: 3.9 MMOL/L — SIGNIFICANT CHANGE UP (ref 3.5–5.3)
POTASSIUM SERPL-SCNC: 3.9 MMOL/L — SIGNIFICANT CHANGE UP (ref 3.5–5.3)
PROT SERPL-MCNC: 7.6 G/DL — SIGNIFICANT CHANGE UP (ref 6–8.3)
RBC # BLD: 4.19 M/UL — SIGNIFICANT CHANGE UP (ref 3.8–5.2)
RBC # FLD: 12.7 % — SIGNIFICANT CHANGE UP (ref 10.3–14.5)
SODIUM SERPL-SCNC: 139 MMOL/L — SIGNIFICANT CHANGE UP (ref 135–145)
WBC # BLD: 4.66 K/UL — SIGNIFICANT CHANGE UP (ref 3.8–10.5)
WBC # FLD AUTO: 4.66 K/UL — SIGNIFICANT CHANGE UP (ref 3.8–10.5)

## 2023-01-18 PROCEDURE — 93010 ELECTROCARDIOGRAM REPORT: CPT

## 2023-01-18 NOTE — H&P PST ADULT - CARDIOVASCULAR COMMENTS
METs- walks with walker, ADLs, can climb 1 flight of stairs with cane, denies SOB or chest pain METs- walks with walker, ADLs, very limited activities <4

## 2023-01-18 NOTE — H&P PST ADULT - PROBLEM SELECTOR PLAN 2
Patient with elevated BP at /88 mm hg repeat 168/100 mmhg.  EKG and comparison EKG and ECHO results in chart.  Patient was recently hospitalized on 10/2022 due to collapse- vasovagal syncope- Sinus bradycardia from 40-60's - Patient didn't follow up with EP/ cardiology Dr Murillo. requesting  cardiology/ EP evaluation prior to procedure. Patient with elevated BP at /88 mm hg repeat 168/100 mmhg, advanced age, low METs  EKG and comparison EKG and ECHO results in chart.  Patient was recently hospitalized on 10/2022 due to collapse- vasovagal syncope- Sinus bradycardia from 40-60's - Patient didn't follow up with EP/ cardiology Dr Murillo. requesting  cardiology/ EP evaluation prior to procedure.

## 2023-01-18 NOTE — H&P PST ADULT - PROBLEM SELECTOR PLAN 1
Patient is tentatively scheduled for surgery- ERCP stent removal with Dr Adams on 01/24/2023.    Pre-op instructions provided. Pt given verbal and written instructions with teach back on pepcid. Pt verbalized understanding with return demonstration.    Routine Covid PCR test ordered .Instructions regarding covid PCR test and locations for covid testing site provided. Pt verbalized understanding    CBC, CMP sent

## 2023-01-18 NOTE — H&P PST ADULT - ASSESSMENT
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

## 2023-01-18 NOTE — H&P PST ADULT - NSICDXPASTMEDICALHX_GEN_ALL_CORE_FT
PAST MEDICAL HISTORY:  Dementia     H/O sinus bradycardia     HLD (hyperlipidemia)     HTN (hypertension)     Vasovagal syncope

## 2023-01-18 NOTE — H&P PST ADULT - NEUROLOGICAL COMMENTS
patient was admitted in hospital 10/2022- collapse as per daughter- found to have low heart rate due- dcd aricept, didn't follow up with EP Dr Murillo .

## 2023-01-18 NOTE — H&P PST ADULT - PROBLEM SELECTOR PLAN 3
JAD precautions  complete visualization of uvula- class 2- Mallampati  denies loose teeth, upper and lower partial dentures

## 2023-01-23 ENCOUNTER — NON-APPOINTMENT (OUTPATIENT)
Age: 83
End: 2023-01-23

## 2023-02-02 ENCOUNTER — APPOINTMENT (OUTPATIENT)
Dept: ELECTROPHYSIOLOGY | Facility: CLINIC | Age: 83
End: 2023-02-02
Payer: MEDICARE

## 2023-02-02 ENCOUNTER — NON-APPOINTMENT (OUTPATIENT)
Age: 83
End: 2023-02-02

## 2023-02-02 VITALS
DIASTOLIC BLOOD PRESSURE: 84 MMHG | BODY MASS INDEX: 24.91 KG/M2 | HEART RATE: 56 BPM | WEIGHT: 155 LBS | OXYGEN SATURATION: 99 % | HEIGHT: 66 IN | SYSTOLIC BLOOD PRESSURE: 159 MMHG

## 2023-02-02 DIAGNOSIS — R00.1 BRADYCARDIA, UNSPECIFIED: ICD-10-CM

## 2023-02-02 DIAGNOSIS — N17.9 ACUTE KIDNEY FAILURE, UNSPECIFIED: ICD-10-CM

## 2023-02-02 DIAGNOSIS — I10 ESSENTIAL (PRIMARY) HYPERTENSION: ICD-10-CM

## 2023-02-02 DIAGNOSIS — Z78.9 OTHER SPECIFIED HEALTH STATUS: ICD-10-CM

## 2023-02-02 PROCEDURE — 99215 OFFICE O/P EST HI 40 MIN: CPT

## 2023-02-02 PROCEDURE — 93000 ELECTROCARDIOGRAM COMPLETE: CPT

## 2023-02-02 RX ORDER — CYCLOSPORINE 0.5 MG/ML
0.05 EMULSION OPHTHALMIC
Refills: 0 | Status: ACTIVE | COMMUNITY

## 2023-02-02 RX ORDER — BRIMONIDINE TARTRATE, TIMOLOL MALEATE 2; 5 MG/ML; MG/ML
0.2-0.5 SOLUTION/ DROPS OPHTHALMIC
Refills: 0 | Status: ACTIVE | COMMUNITY

## 2023-02-02 RX ORDER — LOSARTAN POTASSIUM 25 MG/1
25 TABLET, FILM COATED ORAL
Refills: 0 | Status: ACTIVE | COMMUNITY

## 2023-02-02 RX ORDER — DORZOLAMIDE HYDROCHLORIDE 20 MG/ML
2 SOLUTION OPHTHALMIC
Refills: 0 | Status: ACTIVE | COMMUNITY

## 2023-02-02 RX ORDER — ROSUVASTATIN CALCIUM 20 MG/1
20 TABLET, FILM COATED ORAL
Refills: 0 | Status: ACTIVE | COMMUNITY

## 2023-02-03 ENCOUNTER — NON-APPOINTMENT (OUTPATIENT)
Age: 83
End: 2023-02-03

## 2023-02-03 NOTE — HISTORY OF PRESENT ILLNESS
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 Back in October 2022 she originally presented to the hospital with complaints of weakness. Was noted to all have abdominal pain and CT showed dilated pancreatic duct without pancreatic mass . Underwent stent placement. Telemetry revealed sinus bradycardia while sleeping, believed to be vagal. Was taken off Aricept at that time and remains off Aricept or AV nodals. Accompanied by family member who states she is doing well. Denies chest pain, palpitations, SOB, syncope or near syncope.\par

## 2023-02-03 NOTE — CARDIOLOGY SUMMARY
[de-identified] : 10/3/2022: CONCLUSIONS:\par 1. Mitral annular calcification, otherwise normal mitral\par valve. Minimal mitral regurgitation.\par 2. Normal left ventricular internal dimensions and wall\par thicknesses.\par 3. Normal left ventricular systolic function. No segmental\par wall motion abnormalities.\par 4. Normal right ventricular size and function.

## 2023-02-14 PROBLEM — Z86.79 PERSONAL HISTORY OF OTHER DISEASES OF THE CIRCULATORY SYSTEM: Chronic | Status: ACTIVE | Noted: 2023-01-18

## 2023-02-14 PROBLEM — R55 SYNCOPE AND COLLAPSE: Chronic | Status: ACTIVE | Noted: 2023-01-18

## 2023-02-16 ENCOUNTER — OUTPATIENT (OUTPATIENT)
Dept: OUTPATIENT SERVICES | Facility: HOSPITAL | Age: 83
LOS: 1 days | End: 2023-02-16

## 2023-02-16 VITALS
SYSTOLIC BLOOD PRESSURE: 150 MMHG | HEIGHT: 64 IN | TEMPERATURE: 97 F | DIASTOLIC BLOOD PRESSURE: 80 MMHG | OXYGEN SATURATION: 99 % | HEART RATE: 63 BPM | WEIGHT: 158.07 LBS | RESPIRATION RATE: 15 BRPM

## 2023-02-16 DIAGNOSIS — Z98.890 OTHER SPECIFIED POSTPROCEDURAL STATES: Chronic | ICD-10-CM

## 2023-02-16 DIAGNOSIS — Z73.89 OTHER PROBLEMS RELATED TO LIFE MANAGEMENT DIFFICULTY: ICD-10-CM

## 2023-02-16 DIAGNOSIS — K83.1 OBSTRUCTION OF BILE DUCT: ICD-10-CM

## 2023-02-16 DIAGNOSIS — Z90.5 ACQUIRED ABSENCE OF KIDNEY: Chronic | ICD-10-CM

## 2023-02-16 DIAGNOSIS — R09.89 OTHER SPECIFIED SYMPTOMS AND SIGNS INVOLVING THE CIRCULATORY AND RESPIRATORY SYSTEMS: ICD-10-CM

## 2023-02-16 DIAGNOSIS — H40.9 UNSPECIFIED GLAUCOMA: ICD-10-CM

## 2023-02-16 LAB
ALBUMIN SERPL ELPH-MCNC: 4 G/DL — SIGNIFICANT CHANGE UP (ref 3.3–5)
ALP SERPL-CCNC: 79 U/L — SIGNIFICANT CHANGE UP (ref 40–120)
ALT FLD-CCNC: 11 U/L — SIGNIFICANT CHANGE UP (ref 4–33)
ANION GAP SERPL CALC-SCNC: 5 MMOL/L — LOW (ref 7–14)
AST SERPL-CCNC: 23 U/L — SIGNIFICANT CHANGE UP (ref 4–32)
BILIRUB SERPL-MCNC: 0.2 MG/DL — SIGNIFICANT CHANGE UP (ref 0.2–1.2)
BUN SERPL-MCNC: 19 MG/DL — SIGNIFICANT CHANGE UP (ref 7–23)
CALCIUM SERPL-MCNC: 11 MG/DL — HIGH (ref 8.4–10.5)
CHLORIDE SERPL-SCNC: 106 MMOL/L — SIGNIFICANT CHANGE UP (ref 98–107)
CO2 SERPL-SCNC: 31 MMOL/L — SIGNIFICANT CHANGE UP (ref 22–31)
CREAT SERPL-MCNC: 1.03 MG/DL — SIGNIFICANT CHANGE UP (ref 0.5–1.3)
EGFR: 54 ML/MIN/1.73M2 — LOW
GLUCOSE SERPL-MCNC: 86 MG/DL — SIGNIFICANT CHANGE UP (ref 70–99)
POTASSIUM SERPL-MCNC: 4.3 MMOL/L — SIGNIFICANT CHANGE UP (ref 3.5–5.3)
POTASSIUM SERPL-SCNC: 4.3 MMOL/L — SIGNIFICANT CHANGE UP (ref 3.5–5.3)
PROT SERPL-MCNC: 7.8 G/DL — SIGNIFICANT CHANGE UP (ref 6–8.3)
SODIUM SERPL-SCNC: 142 MMOL/L — SIGNIFICANT CHANGE UP (ref 135–145)

## 2023-02-16 RX ORDER — SODIUM CHLORIDE 9 MG/ML
1000 INJECTION, SOLUTION INTRAVENOUS
Refills: 0 | Status: DISCONTINUED | OUTPATIENT
Start: 2023-02-21 | End: 2023-03-07

## 2023-02-16 RX ORDER — SODIUM CHLORIDE 9 MG/ML
500 INJECTION, SOLUTION INTRAVENOUS
Refills: 0 | Status: DISCONTINUED | OUTPATIENT
Start: 2023-02-21 | End: 2023-03-07

## 2023-02-16 RX ORDER — CICLOPIROX OLAMINE 7.7 MG/G
1 CREAM TOPICAL
Qty: 0 | Refills: 0 | DISCHARGE

## 2023-02-16 RX ORDER — SOD,AMMONIUM,POTASSIUM LACTATE
1 CREAM (GRAM) TOPICAL
Qty: 0 | Refills: 0 | DISCHARGE

## 2023-02-16 NOTE — H&P PST ADULT - PROBLEM SELECTOR PLAN 2
Patient with Low METs, HTN, HLD, dementia- with elevated BP last PST visit  - followed up with cardiologist-cardiology evaluation in chart with ECHO results.    Patient instructed to take losartan with a sip of water on the morning of procedure.

## 2023-02-16 NOTE — H&P PST ADULT - PROBLEM SELECTOR PLAN 4
denies loose teeth, pt reports she has upper and lower partial dentures  complete visualization of uvula- class 2- Mallampati  JAD precautions

## 2023-02-16 NOTE — H&P PST ADULT - NSICDXPASTMEDICALHX_GEN_ALL_CORE_FT
PAST MEDICAL HISTORY:  Dementia     H/O sinus bradycardia     HLD (hyperlipidemia)     HTN (hypertension)     Vasovagal syncope      PAST MEDICAL HISTORY:  Dementia     Glaucoma     H/O sinus bradycardia     HLD (hyperlipidemia)     HTN (hypertension)     Vasovagal syncope

## 2023-02-16 NOTE — H&P PST ADULT - NEUROLOGICAL COMMENTS
patient was admitted in hospital 10/2022- collapse as per daughter- found to have low heart rate due- dcd aricept, patient was here for PST 01/18/2023, followed up with cardio/EP  Dr Murillo .

## 2023-02-16 NOTE — H&P PST ADULT - PROBLEM SELECTOR PLAN 1
Patient is tentatively scheduled surgery- ERCP stent removal with Dr Adams on 02/21/2023.    Pre-op instructions provided. Pt given verbal and written instructions with teach back on chlorhexidine wash. Pt verbalized understanding with return demonstration.    Routine Covid PCR test ordered .Instructions regarding covid PCR test and locations for covid testing site provided. Pt verbalized understanding    Recent CBC in chart  CMP sent

## 2023-02-17 NOTE — ASU PATIENT PROFILE, ADULT - NSICDXPASTMEDICALHX_GEN_ALL_CORE_FT
PAST MEDICAL HISTORY:  Dementia     Glaucoma     H/O sinus bradycardia     HLD (hyperlipidemia)     HTN (hypertension)     Vasovagal syncope

## 2023-02-17 NOTE — ASU PATIENT PROFILE, ADULT - FALL HARM RISK - UNIVERSAL INTERVENTIONS
Bed in lowest position, wheels locked, appropriate side rails in place/Call bell, personal items and telephone in reach/Instruct patient to call for assistance before getting out of bed or chair/Non-slip footwear when patient is out of bed/[place] to call system/Physically safe environment - no spills, clutter or unnecessary equipment/Purposeful Proactive Rounding/Room/bathroom lighting operational, light cord in reach

## 2023-02-21 ENCOUNTER — APPOINTMENT (OUTPATIENT)
Dept: GASTROENTEROLOGY | Facility: HOSPITAL | Age: 83
End: 2023-02-21

## 2023-02-21 ENCOUNTER — TRANSCRIPTION ENCOUNTER (OUTPATIENT)
Age: 83
End: 2023-02-21

## 2023-02-21 ENCOUNTER — OUTPATIENT (OUTPATIENT)
Dept: OUTPATIENT SERVICES | Facility: HOSPITAL | Age: 83
LOS: 1 days | Discharge: HOME | End: 2023-02-21
Payer: MEDICARE

## 2023-02-21 ENCOUNTER — RESULT REVIEW (OUTPATIENT)
Age: 83
End: 2023-02-21

## 2023-02-21 VITALS
HEART RATE: 60 BPM | OXYGEN SATURATION: 97 % | DIASTOLIC BLOOD PRESSURE: 76 MMHG | RESPIRATION RATE: 17 BRPM | SYSTOLIC BLOOD PRESSURE: 170 MMHG

## 2023-02-21 VITALS
SYSTOLIC BLOOD PRESSURE: 145 MMHG | WEIGHT: 154.98 LBS | HEIGHT: 64 IN | RESPIRATION RATE: 19 BRPM | HEART RATE: 53 BPM | DIASTOLIC BLOOD PRESSURE: 58 MMHG | TEMPERATURE: 97 F | OXYGEN SATURATION: 100 %

## 2023-02-21 DIAGNOSIS — Z90.5 ACQUIRED ABSENCE OF KIDNEY: Chronic | ICD-10-CM

## 2023-02-21 DIAGNOSIS — Z98.890 OTHER SPECIFIED POSTPROCEDURAL STATES: Chronic | ICD-10-CM

## 2023-02-21 DIAGNOSIS — K83.1 OBSTRUCTION OF BILE DUCT: ICD-10-CM

## 2023-02-21 PROCEDURE — 88305 TISSUE EXAM BY PATHOLOGIST: CPT | Mod: 26

## 2023-02-21 PROCEDURE — 43275 ERCP REMOVE FORGN BODY DUCT: CPT | Mod: GC

## 2023-02-21 PROCEDURE — 43264 ERCP REMOVE DUCT CALCULI: CPT | Mod: GC

## 2023-02-21 PROCEDURE — 74330 X-RAY BILE/PANC ENDOSCOPY: CPT | Mod: 26,GC

## 2023-02-21 PROCEDURE — 43277 ERCP EA DUCT/AMPULLA DILATE: CPT | Mod: GC,59

## 2023-02-21 PROCEDURE — 88305 TISSUE EXAM BY PATHOLOGIST: CPT | Mod: 26,59

## 2023-02-21 PROCEDURE — 43261 ENDO CHOLANGIOPANCREATOGRAPH: CPT | Mod: GC,59

## 2023-02-21 PROCEDURE — 88112 CYTOPATH CELL ENHANCE TECH: CPT | Mod: 26

## 2023-02-21 DEVICE — GWIRE JAGTOME REVOLUTION RX 260CM/0.025IN: Type: IMPLANTABLE DEVICE | Status: FUNCTIONAL

## 2023-02-21 DEVICE — CATH BLLN BIL RX 10MMX4CM: Type: IMPLANTABLE DEVICE | Status: FUNCTIONAL

## 2023-02-21 DEVICE — BLLN EXTRCTR PRO RX-S 9-12MM: Type: IMPLANTABLE DEVICE | Status: FUNCTIONAL

## 2023-02-21 NOTE — ASU DISCHARGE PLAN (ADULT/PEDIATRIC) - NS MD DC FALL RISK RISK
For information on Fall & Injury Prevention, visit: [email or web address] OR  [email or web address] OR  https://www.cdc.gov/steadi/patient.html

## 2023-02-21 NOTE — PRE PROCEDURE NOTE - PRE PROCEDURE EVALUATION
Attending Physician:     Dr. Adams                       Procedure: ERCP    Indication for Procedure: Stent removal   ________________________________________________________  PAST MEDICAL & SURGICAL HISTORY:  Dementia      HTN (hypertension)      HLD (hyperlipidemia)      H/O sinus bradycardia      Vasovagal syncope      Glaucoma      H/O right nephrectomy      H/O endoscopy        ALLERGIES:  Lipitor (Rash)    HOME MEDICATIONS:  Combigan 0.2%-0.5% ophthalmic solution: 1 drop(s) to each affected eye every 12 hours (home med)  dorzolamide 2% ophthalmic solution: 1 drop(s) to each affected eye 2 times a day (home med)  Multi Vitamin+: orally once a day/ LD on 02/15/23  Restasis 0.05% ophthalmic emulsion: 1 drop(s) in each eye every 12 hours  rosuvastatin 20 mg oral tablet: 1 tab(s) orally once a day (at bedtime)    AICD/PPM: [ ] yes   [x ] no    PERTINENT LAB DATA:  N/A                    PHYSICAL EXAMINATION:    T(C): --  HR: --  BP: --  RR: --  SpO2: --    Constitutional: NAD    Neck:  No JVD  Respiratory: CTAB/L  Cardiovascular: S1 and S2  Gastrointestinal: BS+, soft, NT/ND  Extremities: No peripheral edema  Neurological: A/O x 3, no focal deficits        COMMENTS:    The patient is a suitable candidate for the planned procedure unless box checked [ ]  No, explain:

## 2023-03-06 ENCOUNTER — NON-APPOINTMENT (OUTPATIENT)
Age: 83
End: 2023-03-06

## 2023-04-07 ENCOUNTER — INPATIENT (INPATIENT)
Facility: HOSPITAL | Age: 83
LOS: 3 days | Discharge: HOME HEALTH | End: 2023-04-11
Attending: INTERNAL MEDICINE | Admitting: INTERNAL MEDICINE
Payer: MEDICARE

## 2023-04-07 VITALS
HEIGHT: 64 IN | SYSTOLIC BLOOD PRESSURE: 146 MMHG | TEMPERATURE: 98 F | OXYGEN SATURATION: 100 % | RESPIRATION RATE: 18 BRPM | HEART RATE: 83 BPM | DIASTOLIC BLOOD PRESSURE: 77 MMHG

## 2023-04-07 DIAGNOSIS — Z90.5 ACQUIRED ABSENCE OF KIDNEY: Chronic | ICD-10-CM

## 2023-04-07 DIAGNOSIS — Z98.890 OTHER SPECIFIED POSTPROCEDURAL STATES: Chronic | ICD-10-CM

## 2023-04-07 LAB
ALBUMIN SERPL ELPH-MCNC: 3.7 G/DL — SIGNIFICANT CHANGE UP (ref 3.3–5)
ALP SERPL-CCNC: 72 U/L — SIGNIFICANT CHANGE UP (ref 40–120)
ALT FLD-CCNC: 14 U/L — SIGNIFICANT CHANGE UP (ref 4–33)
ANION GAP SERPL CALC-SCNC: 11 MMOL/L — SIGNIFICANT CHANGE UP (ref 7–14)
APTT BLD: 25 SEC — LOW (ref 27–36.3)
AST SERPL-CCNC: 31 U/L — SIGNIFICANT CHANGE UP (ref 4–32)
BASE EXCESS BLDV CALC-SCNC: 4.4 MMOL/L — HIGH (ref -2–3)
BASOPHILS # BLD AUTO: 0.02 K/UL — SIGNIFICANT CHANGE UP (ref 0–0.2)
BASOPHILS NFR BLD AUTO: 0.2 % — SIGNIFICANT CHANGE UP (ref 0–2)
BILIRUB SERPL-MCNC: 0.8 MG/DL — SIGNIFICANT CHANGE UP (ref 0.2–1.2)
BLOOD GAS VENOUS COMPREHENSIVE RESULT: SIGNIFICANT CHANGE UP
BUN SERPL-MCNC: 22 MG/DL — SIGNIFICANT CHANGE UP (ref 7–23)
CALCIUM SERPL-MCNC: 10.6 MG/DL — HIGH (ref 8.4–10.5)
CHLORIDE BLDV-SCNC: 108 MMOL/L — SIGNIFICANT CHANGE UP (ref 96–108)
CHLORIDE SERPL-SCNC: 107 MMOL/L — SIGNIFICANT CHANGE UP (ref 98–107)
CO2 BLDV-SCNC: 32.8 MMOL/L — HIGH (ref 22–26)
CO2 SERPL-SCNC: 25 MMOL/L — SIGNIFICANT CHANGE UP (ref 22–31)
CREAT SERPL-MCNC: 1.1 MG/DL — SIGNIFICANT CHANGE UP (ref 0.5–1.3)
EGFR: 50 ML/MIN/1.73M2 — LOW
EOSINOPHIL # BLD AUTO: 0.01 K/UL — SIGNIFICANT CHANGE UP (ref 0–0.5)
EOSINOPHIL NFR BLD AUTO: 0.1 % — SIGNIFICANT CHANGE UP (ref 0–6)
FLUAV AG NPH QL: SIGNIFICANT CHANGE UP
FLUBV AG NPH QL: SIGNIFICANT CHANGE UP
GAS PNL BLDV: 141 MMOL/L — SIGNIFICANT CHANGE UP (ref 136–145)
GLUCOSE BLDV-MCNC: 116 MG/DL — HIGH (ref 70–99)
GLUCOSE SERPL-MCNC: 132 MG/DL — HIGH (ref 70–99)
HCO3 BLDV-SCNC: 31 MMOL/L — HIGH (ref 22–29)
HCT VFR BLD CALC: 39.1 % — SIGNIFICANT CHANGE UP (ref 34.5–45)
HCT VFR BLDA CALC: 37 % — SIGNIFICANT CHANGE UP (ref 34.5–46.5)
HGB BLD CALC-MCNC: 12.3 G/DL — SIGNIFICANT CHANGE UP (ref 11.7–16.1)
HGB BLD-MCNC: 11.8 G/DL — SIGNIFICANT CHANGE UP (ref 11.5–15.5)
IANC: 8.98 K/UL — HIGH (ref 1.8–7.4)
IMM GRANULOCYTES NFR BLD AUTO: 0.4 % — SIGNIFICANT CHANGE UP (ref 0–0.9)
INR BLD: 1.24 RATIO — HIGH (ref 0.88–1.16)
LACTATE BLDV-MCNC: 1.7 MMOL/L — SIGNIFICANT CHANGE UP (ref 0.5–2)
LIDOCAIN IGE QN: 18 U/L — SIGNIFICANT CHANGE UP (ref 7–60)
LYMPHOCYTES # BLD AUTO: 1.32 K/UL — SIGNIFICANT CHANGE UP (ref 1–3.3)
LYMPHOCYTES # BLD AUTO: 11.9 % — LOW (ref 13–44)
MAGNESIUM SERPL-MCNC: 2 MG/DL — SIGNIFICANT CHANGE UP (ref 1.6–2.6)
MCHC RBC-ENTMCNC: 27.4 PG — SIGNIFICANT CHANGE UP (ref 27–34)
MCHC RBC-ENTMCNC: 30.2 GM/DL — LOW (ref 32–36)
MCV RBC AUTO: 90.9 FL — SIGNIFICANT CHANGE UP (ref 80–100)
MONOCYTES # BLD AUTO: 0.73 K/UL — SIGNIFICANT CHANGE UP (ref 0–0.9)
MONOCYTES NFR BLD AUTO: 6.6 % — SIGNIFICANT CHANGE UP (ref 2–14)
NEUTROPHILS # BLD AUTO: 8.98 K/UL — HIGH (ref 1.8–7.4)
NEUTROPHILS NFR BLD AUTO: 80.8 % — HIGH (ref 43–77)
NRBC # BLD: 0 /100 WBCS — SIGNIFICANT CHANGE UP (ref 0–0)
NRBC # FLD: 0 K/UL — SIGNIFICANT CHANGE UP (ref 0–0)
PCO2 BLDV: 55 MMHG — HIGH (ref 39–52)
PH BLDV: 7.36 — SIGNIFICANT CHANGE UP (ref 7.32–7.43)
PHOSPHATE SERPL-MCNC: 3.6 MG/DL — SIGNIFICANT CHANGE UP (ref 2.5–4.5)
PLATELET # BLD AUTO: 205 K/UL — SIGNIFICANT CHANGE UP (ref 150–400)
PO2 BLDV: 25 MMHG — SIGNIFICANT CHANGE UP (ref 25–45)
POTASSIUM BLDV-SCNC: 5.3 MMOL/L — HIGH (ref 3.5–5.1)
POTASSIUM SERPL-MCNC: 5.8 MMOL/L — HIGH (ref 3.5–5.3)
POTASSIUM SERPL-SCNC: 5.8 MMOL/L — HIGH (ref 3.5–5.3)
PROT SERPL-MCNC: 7.6 G/DL — SIGNIFICANT CHANGE UP (ref 6–8.3)
PROTHROM AB SERPL-ACNC: 14.4 SEC — HIGH (ref 10.5–13.4)
RBC # BLD: 4.3 M/UL — SIGNIFICANT CHANGE UP (ref 3.8–5.2)
RBC # FLD: 13.6 % — SIGNIFICANT CHANGE UP (ref 10.3–14.5)
RSV RNA NPH QL NAA+NON-PROBE: SIGNIFICANT CHANGE UP
SAO2 % BLDV: 34.7 % — LOW (ref 67–88)
SARS-COV-2 RNA SPEC QL NAA+PROBE: SIGNIFICANT CHANGE UP
SODIUM SERPL-SCNC: 143 MMOL/L — SIGNIFICANT CHANGE UP (ref 135–145)
TROPONIN T, HIGH SENSITIVITY RESULT: 16 NG/L — SIGNIFICANT CHANGE UP
TROPONIN T, HIGH SENSITIVITY RESULT: 17 NG/L — SIGNIFICANT CHANGE UP
WBC # BLD: 11.1 K/UL — HIGH (ref 3.8–10.5)
WBC # FLD AUTO: 11.1 K/UL — HIGH (ref 3.8–10.5)

## 2023-04-07 PROCEDURE — 74177 CT ABD & PELVIS W/CONTRAST: CPT | Mod: 26,MA

## 2023-04-07 PROCEDURE — 99285 EMERGENCY DEPT VISIT HI MDM: CPT

## 2023-04-07 PROCEDURE — 71046 X-RAY EXAM CHEST 2 VIEWS: CPT | Mod: 26

## 2023-04-07 NOTE — ED PROVIDER NOTE - ATTENDING CONTRIBUTION TO CARE
DR. BLOCH, ATTENDING MD-  I performed a face to face bedside interview with patient regarding history of present illness, review of symptoms and past medical history. I completed an independent physical exam.  I have discussed patient's plan of care with the resident.  Patient examined, awake, heent nml heart s1s2, lungs clear abd soft, tender RLQ. extrem no edema, pulses intact.

## 2023-04-07 NOTE — ED PROVIDER NOTE - PHYSICAL EXAMINATION
General: NAD  HEENT: NCAT. Non erythematous, non swollen tonsils. No exudates.  Cardiac: RRR, 2+ radial pulses  Chest: CTA  Abdomen: soft, non-distended, + ttp RLQ, no rebound or guarding  Extremities: no peripheral edema, calf tenderness, or leg size discrepancies  Skin: no rashes  Neuro: AAOx2, motor and sensory grossly intact.   Psych: mood and affect appropriate

## 2023-04-07 NOTE — ED PROVIDER NOTE - CLINICAL SUMMARY MEDICAL DECISION MAKING FREE TEXT BOX
This is a 82 year old female with pmh of HTN on losartan, dementia (baseline mental status A&O 2-3, ambulatory without any assistance) presenting with her daughter with chief complaint of decreased PO intake and increased lethargy. + dysuria. Afebrile. Vitals wnl. Well appearing female. A&O x2 here, but is at her baseline. Unremarkable physical exam, however RLQ tenderness to palpation. Will work up for weakness/lethargy with cbc, cmp, trop, ekg, cxr. Will assess for any intraabdominal pathology with CT abd/pelv w/ IV contrast. Dispo pending reassessment and labs/imaging.

## 2023-04-07 NOTE — ED ADULT NURSE NOTE - BEFAST ARM NUMBNESS
Chart reviewed and was recently at the hospital where they did a CBC and BMP, which were normal on 8/1/19.  Had a cholesterol screen last in 2017, which was normal so doesn't need to be rechecked at this time.  Therefore, at this time do not think she needs to do any labs prior to her physical unless she has a biometric screening form that needs to be completed for her insurance.  If so then would place for a fasting glucose and lipid panel.  Please let her know.   No

## 2023-04-07 NOTE — ED ADULT TRIAGE NOTE - CHIEF COMPLAINT QUOTE
Pt AOX2-3 (difficulty w/ time) had syncopal episode at home, family states she was "out" for 20 minutes; had similar episode in Oct 2022 and after visit to ER was found to need a pancreatic stent which was removed in February 2023  Hx of dementia

## 2023-04-07 NOTE — ED PROVIDER NOTE - OBJECTIVE STATEMENT
This is a 82 year old female with pmh of HTN on losartan, dementia (baseline mental status A&O 2-3, ambulatory without any assistance) presenting with her daughter with chief complaint of decreased PO intake and increased lethargy. Different from the triage note, the patient and her daughter denies that there was a syncopal episode. Daughter reports that the patient has been more lethargic, less responsive and more sleepy, and have decreased PO intake This is a 82 year old female with pmh of HTN on losartan, dementia (baseline mental status A&O 2-3, ambulatory without any assistance) presenting with her daughter with chief complaint of decreased PO intake and increased lethargy. Different from the triage note, the patient and her daughter denies that there was a syncopal episode. Daughter reports that the patient has been more lethargic, less responsive and more sleepy, and have decreased PO intake. Endorses dysuria and chills. Denies any recorded fever. Otherwise at her normal state of health until yesterday. Denies any nausea, vomiting.

## 2023-04-07 NOTE — ED ADULT NURSE NOTE - OBJECTIVE STATEMENT
FLOAT:: 81 yo F AAOx2 (person, place) received to rm 12, daughter at bedside, p/w lethargy, decreased appetite and generalized weakness since this morning, no LOC as per family, sacral skin intact with no breakdown, #20 placed to RAC, sepsis protocol implemented, primary RN Cyndie aware of pt care, pending CT scan

## 2023-04-07 NOTE — ED PROVIDER NOTE - NS ED ROS FT
GENERAL: No fever, + chills  	EYES: No change in vision  	HEENT: No trouble swallowing or speaking  	CARDIAC: No chest pain  	PULMONARY: No cough, no SOB  	GI: + abdominal pain, no nausea, no vomiting, no diarrhea, + constipation  	: + dysuria  	SKIN: No rashes  	NEURO: No headache, no numbness  	MSK: No visible bony deformity   Otherwise as HPI or negative.

## 2023-04-08 DIAGNOSIS — A41.9 SEPSIS, UNSPECIFIED ORGANISM: ICD-10-CM

## 2023-04-08 DIAGNOSIS — Z29.9 ENCOUNTER FOR PROPHYLACTIC MEASURES, UNSPECIFIED: ICD-10-CM

## 2023-04-08 DIAGNOSIS — N39.0 URINARY TRACT INFECTION, SITE NOT SPECIFIED: ICD-10-CM

## 2023-04-08 DIAGNOSIS — I10 ESSENTIAL (PRIMARY) HYPERTENSION: ICD-10-CM

## 2023-04-08 DIAGNOSIS — F03.90 UNSPECIFIED DEMENTIA, UNSPECIFIED SEVERITY, WITHOUT BEHAVIORAL DISTURBANCE, PSYCHOTIC DISTURBANCE, MOOD DISTURBANCE, AND ANXIETY: ICD-10-CM

## 2023-04-08 PROBLEM — H40.9 UNSPECIFIED GLAUCOMA: Chronic | Status: ACTIVE | Noted: 2023-02-16

## 2023-04-08 LAB
-  CTX-M RESISTANCE MARKER: SIGNIFICANT CHANGE UP
-  ESBL: SIGNIFICANT CHANGE UP
ALBUMIN SERPL ELPH-MCNC: 3.5 G/DL — SIGNIFICANT CHANGE UP (ref 3.3–5)
ALP SERPL-CCNC: 69 U/L — SIGNIFICANT CHANGE UP (ref 40–120)
ALT FLD-CCNC: 11 U/L — SIGNIFICANT CHANGE UP (ref 4–33)
ANION GAP SERPL CALC-SCNC: 12 MMOL/L — SIGNIFICANT CHANGE UP (ref 7–14)
APPEARANCE UR: CLEAR — SIGNIFICANT CHANGE UP
AST SERPL-CCNC: 24 U/L — SIGNIFICANT CHANGE UP (ref 4–32)
BACTERIA # UR AUTO: NEGATIVE — SIGNIFICANT CHANGE UP
BILIRUB SERPL-MCNC: 0.8 MG/DL — SIGNIFICANT CHANGE UP (ref 0.2–1.2)
BILIRUB UR-MCNC: NEGATIVE — SIGNIFICANT CHANGE UP
BUN SERPL-MCNC: 22 MG/DL — SIGNIFICANT CHANGE UP (ref 7–23)
CALCIUM SERPL-MCNC: 10.1 MG/DL — SIGNIFICANT CHANGE UP (ref 8.4–10.5)
CHLORIDE SERPL-SCNC: 104 MMOL/L — SIGNIFICANT CHANGE UP (ref 98–107)
CO2 SERPL-SCNC: 25 MMOL/L — SIGNIFICANT CHANGE UP (ref 22–31)
COLOR SPEC: SIGNIFICANT CHANGE UP
CREAT SERPL-MCNC: 1.11 MG/DL — SIGNIFICANT CHANGE UP (ref 0.5–1.3)
DIFF PNL FLD: ABNORMAL
E COLI DNA BLD POS QL NAA+NON-PROBE: SIGNIFICANT CHANGE UP
EGFR: 49 ML/MIN/1.73M2 — LOW
EPI CELLS # UR: 0 /HPF — SIGNIFICANT CHANGE UP (ref 0–5)
GLUCOSE SERPL-MCNC: 141 MG/DL — HIGH (ref 70–99)
GLUCOSE UR QL: NEGATIVE — SIGNIFICANT CHANGE UP
GRAM STN FLD: SIGNIFICANT CHANGE UP
HCT VFR BLD CALC: 36 % — SIGNIFICANT CHANGE UP (ref 34.5–45)
HGB BLD-MCNC: 11.1 G/DL — LOW (ref 11.5–15.5)
KETONES UR-MCNC: NEGATIVE — SIGNIFICANT CHANGE UP
LEUKOCYTE ESTERASE UR-ACNC: ABNORMAL
MAGNESIUM SERPL-MCNC: 2 MG/DL — SIGNIFICANT CHANGE UP (ref 1.6–2.6)
MCHC RBC-ENTMCNC: 27.9 PG — SIGNIFICANT CHANGE UP (ref 27–34)
MCHC RBC-ENTMCNC: 30.8 GM/DL — LOW (ref 32–36)
MCV RBC AUTO: 90.5 FL — SIGNIFICANT CHANGE UP (ref 80–100)
METHOD TYPE: SIGNIFICANT CHANGE UP
NITRITE UR-MCNC: NEGATIVE — SIGNIFICANT CHANGE UP
NRBC # BLD: 0 /100 WBCS — SIGNIFICANT CHANGE UP (ref 0–0)
NRBC # FLD: 0 K/UL — SIGNIFICANT CHANGE UP (ref 0–0)
PH UR: 7 — SIGNIFICANT CHANGE UP (ref 5–8)
PHOSPHATE SERPL-MCNC: 2.6 MG/DL — SIGNIFICANT CHANGE UP (ref 2.5–4.5)
PLATELET # BLD AUTO: 197 K/UL — SIGNIFICANT CHANGE UP (ref 150–400)
POTASSIUM SERPL-MCNC: 3.9 MMOL/L — SIGNIFICANT CHANGE UP (ref 3.5–5.3)
POTASSIUM SERPL-SCNC: 3.9 MMOL/L — SIGNIFICANT CHANGE UP (ref 3.5–5.3)
PROT SERPL-MCNC: 7.2 G/DL — SIGNIFICANT CHANGE UP (ref 6–8.3)
PROT UR-MCNC: ABNORMAL
RBC # BLD: 3.98 M/UL — SIGNIFICANT CHANGE UP (ref 3.8–5.2)
RBC # FLD: 13.6 % — SIGNIFICANT CHANGE UP (ref 10.3–14.5)
RBC CASTS # UR COMP ASSIST: 32 /HPF — HIGH (ref 0–4)
SODIUM SERPL-SCNC: 141 MMOL/L — SIGNIFICANT CHANGE UP (ref 135–145)
SP GR SPEC: 1.03 — SIGNIFICANT CHANGE UP (ref 1.01–1.05)
SPECIMEN SOURCE: SIGNIFICANT CHANGE UP
SPECIMEN SOURCE: SIGNIFICANT CHANGE UP
UROBILINOGEN FLD QL: SIGNIFICANT CHANGE UP
WBC # BLD: 12.13 K/UL — HIGH (ref 3.8–10.5)
WBC # FLD AUTO: 12.13 K/UL — HIGH (ref 3.8–10.5)
WBC UR QL: 45 /HPF — HIGH (ref 0–5)

## 2023-04-08 PROCEDURE — 99223 1ST HOSP IP/OBS HIGH 75: CPT | Mod: GC

## 2023-04-08 PROCEDURE — 76705 ECHO EXAM OF ABDOMEN: CPT | Mod: 26

## 2023-04-08 RX ORDER — ACETAMINOPHEN 500 MG
650 TABLET ORAL EVERY 6 HOURS
Refills: 0 | Status: DISCONTINUED | OUTPATIENT
Start: 2023-04-08 | End: 2023-04-11

## 2023-04-08 RX ORDER — LANOLIN ALCOHOL/MO/W.PET/CERES
3 CREAM (GRAM) TOPICAL AT BEDTIME
Refills: 0 | Status: DISCONTINUED | OUTPATIENT
Start: 2023-04-08 | End: 2023-04-11

## 2023-04-08 RX ORDER — CEFTRIAXONE 500 MG/1
1000 INJECTION, POWDER, FOR SOLUTION INTRAMUSCULAR; INTRAVENOUS ONCE
Refills: 0 | Status: COMPLETED | OUTPATIENT
Start: 2023-04-08 | End: 2023-04-08

## 2023-04-08 RX ORDER — SODIUM CHLORIDE 9 MG/ML
1000 INJECTION, SOLUTION INTRAVENOUS ONCE
Refills: 0 | Status: COMPLETED | OUTPATIENT
Start: 2023-04-08 | End: 2023-04-08

## 2023-04-08 RX ORDER — ACETAMINOPHEN 500 MG
1000 TABLET ORAL ONCE
Refills: 0 | Status: COMPLETED | OUTPATIENT
Start: 2023-04-08 | End: 2023-04-08

## 2023-04-08 RX ORDER — DORZOLAMIDE HYDROCHLORIDE 20 MG/ML
1 SOLUTION/ DROPS OPHTHALMIC THREE TIMES A DAY
Refills: 0 | Status: DISCONTINUED | OUTPATIENT
Start: 2023-04-08 | End: 2023-04-11

## 2023-04-08 RX ORDER — ERTAPENEM SODIUM 1 G/1
1000 INJECTION, POWDER, LYOPHILIZED, FOR SOLUTION INTRAMUSCULAR; INTRAVENOUS EVERY 24 HOURS
Refills: 0 | Status: DISCONTINUED | OUTPATIENT
Start: 2023-04-08 | End: 2023-04-11

## 2023-04-08 RX ORDER — ENOXAPARIN SODIUM 100 MG/ML
40 INJECTION SUBCUTANEOUS EVERY 24 HOURS
Refills: 0 | Status: DISCONTINUED | OUTPATIENT
Start: 2023-04-08 | End: 2023-04-11

## 2023-04-08 RX ORDER — SODIUM CHLORIDE 9 MG/ML
1000 INJECTION, SOLUTION INTRAVENOUS
Refills: 0 | Status: DISCONTINUED | OUTPATIENT
Start: 2023-04-08 | End: 2023-04-11

## 2023-04-08 RX ORDER — CEFTRIAXONE 500 MG/1
2000 INJECTION, POWDER, FOR SOLUTION INTRAMUSCULAR; INTRAVENOUS EVERY 24 HOURS
Refills: 0 | Status: DISCONTINUED | OUTPATIENT
Start: 2023-04-09 | End: 2023-04-08

## 2023-04-08 RX ADMIN — SODIUM CHLORIDE 100 MILLILITER(S): 9 INJECTION, SOLUTION INTRAVENOUS at 14:29

## 2023-04-08 RX ADMIN — Medication 400 MILLIGRAM(S): at 04:09

## 2023-04-08 RX ADMIN — Medication 1 DROP(S): at 22:23

## 2023-04-08 RX ADMIN — ENOXAPARIN SODIUM 40 MILLIGRAM(S): 100 INJECTION SUBCUTANEOUS at 17:36

## 2023-04-08 RX ADMIN — CEFTRIAXONE 100 MILLIGRAM(S): 500 INJECTION, POWDER, FOR SOLUTION INTRAMUSCULAR; INTRAVENOUS at 02:50

## 2023-04-08 RX ADMIN — SODIUM CHLORIDE 1000 MILLILITER(S): 9 INJECTION, SOLUTION INTRAVENOUS at 12:32

## 2023-04-08 RX ADMIN — Medication 1000 MILLIGRAM(S): at 06:22

## 2023-04-08 RX ADMIN — DORZOLAMIDE HYDROCHLORIDE 1 DROP(S): 20 SOLUTION/ DROPS OPHTHALMIC at 21:31

## 2023-04-08 RX ADMIN — ERTAPENEM SODIUM 120 MILLIGRAM(S): 1 INJECTION, POWDER, LYOPHILIZED, FOR SOLUTION INTRAMUSCULAR; INTRAVENOUS at 14:29

## 2023-04-08 NOTE — H&P ADULT - NSHPSOCIALHISTORY_GEN_ALL_CORE
Patient lives at home with her Daughter Rabia who is her HCP. Pt is able to ambulate independently w/o any assitance. Is able to carry out her ADL's independently. Denies smoking, alcohol use, drug use.

## 2023-04-08 NOTE — PHYSICAL THERAPY INITIAL EVALUATION ADULT - NSPTDISCHREC_GEN_A_CORE
Anticipated discharge to home with home PT services to improve functional mobility and strength, to optimize safety within the home environment, and to allow pt to return to prior level of function.

## 2023-04-08 NOTE — H&P ADULT - ASSESSMENT
82F w/ PMH of HTN, dementia (baseline AOx2-3, ambulatory without any assistance) presenting w/ lethargy and decreased PO intake found to be septic with suspected source of infection to be  vs pneumobilia   82F w/ PMH of HTN, dementia (baseline AOx2-3, ambulatory without any assistance) presenting w/ lethargy and decreased PO intake found to be septic with suspected source of infection to be  vs (less likely) biliary iso recent ERCP

## 2023-04-08 NOTE — H&P ADULT - PROBLEM SELECTOR PLAN 1
Patient found to be febrile to 103.4F w/ Leukocytosis.   - Source of infection: urinary vs biliary iso pneumobilia after recent ERCP  - Infectious w/u: f/u BCx, UCx  - Start Zosyn until source or species identified  - Tylenol PRN for fever  - Continue to monitor fever/WBC curve Patient found to be febrile to 103.4F w/ leukocytosis WBC=12.1.   - Source of infection: likely urinary vs less likely biliary iso pneumobilia after recent ERCP. No diarrhea. CXR clear. No skin lesions.  - Infectious w/u: f/u BCx, UCx  - Will c/w CTX 1g qd for now for presumed UTI.   - Tylenol PRN for fever  - Continue to monitor fever/WBC curve

## 2023-04-08 NOTE — ED ADULT NURSE REASSESSMENT NOTE - NS ED NURSE REASSESS COMMENT FT1
Break coverage RN. Patient resting in stretcher, respirations even and unlabored. Vitals stable, repeat rectal temp taken, improved. Patient cleaned and changed for comfort.

## 2023-04-08 NOTE — PATIENT PROFILE ADULT - FALL HARM RISK - HARM RISK INTERVENTIONS

## 2023-04-08 NOTE — PHYSICAL THERAPY INITIAL EVALUATION ADULT - ADDITIONAL COMMENTS
Pt owns a shower chair, cane, and rolling walker. Pt has 10 steps with bilateral railings to negotiate at home.

## 2023-04-08 NOTE — H&P ADULT - PROBLEM SELECTOR PLAN 4
DVT: Lovenox 40mg qd  Diet:   Dispo: Pending clinical course and PT eval DVT: Lovenox 40mg qd  Diet:: Regular diet  Dispo: Pending clinical course and PT eval

## 2023-04-08 NOTE — H&P ADULT - PROBLEM SELECTOR PLAN 2
Patient baseline AOx2-3. Dependent on ADL's but ambulatory w/o assistance  - PT eval Patient baseline AOx2-3. Dependent on ADL's but ambulatory w/o assistance  - Planned outpatient workup with neuro in May.

## 2023-04-08 NOTE — H&P ADULT - NSHPPHYSICALEXAM_GEN_ALL_CORE
Vital Signs Last 24 Hrs  T(C): 38 (08 Apr 2023 05:20), Max: 39.6 (08 Apr 2023 03:54)  T(F): 100.4 (08 Apr 2023 05:20), Max: 103.2 (08 Apr 2023 03:54)  HR: 84 (08 Apr 2023 05:20) (80 - 84)  BP: 134/56 (08 Apr 2023 05:20) (134/56 - 163/61)  BP(mean): --  RR: 16 (08 Apr 2023 05:20) (16 - 18)  SpO2: 100% (08 Apr 2023 05:20) (100% - 100%)    Parameters below as of 08 Apr 2023 05:20  Patient On (Oxygen Delivery Method): room air      PHYSICAL EXAM:  GENERAL: NAD, well-groomed, well-developed  HEAD:  Atraumatic, Normocephalic  EYES: EOMI, PERRLA, conjunctiva and sclera clear  ENMT: No tonsillar erythema, exudates, or enlargement; Moist mucous membranes  NECK: Supple, No JVD, Normal thyroid  HEART: Regular rate and rhythm; No murmurs, rubs, or gallops  RESPIRATORY: CTA B/L, No W/R/R  ABDOMEN: Soft, Nontender, Nondistended; Bowel sounds present  NEUROLOGY: A&Ox3, nonfocal, moving all extremities  EXTREMITIES:  2+ Peripheral Pulses, No clubbing, cyanosis, or edema  SKIN: warm, dry, normal color, no rash or abnormal lesions Vital Signs Last 24 Hrs  T(C): 38 (08 Apr 2023 05:20), Max: 39.6 (08 Apr 2023 03:54)  T(F): 100.4 (08 Apr 2023 05:20), Max: 103.2 (08 Apr 2023 03:54)  HR: 84 (08 Apr 2023 05:20) (80 - 84)  BP: 134/56 (08 Apr 2023 05:20) (134/56 - 163/61)  BP(mean): --  RR: 16 (08 Apr 2023 05:20) (16 - 18)  SpO2: 100% (08 Apr 2023 05:20) (100% - 100%)    Parameters below as of 08 Apr 2023 05:20  Patient On (Oxygen Delivery Method): room air      PHYSICAL EXAM:  GENERAL: NAD, well-groomed, well-developed  HEAD:  Atraumatic, Normocephalic  EYES: EOMI, PERRLA, conjunctiva and sclera clear  ENMT: No tonsillar erythema, exudates, or enlargement; Moist mucous membranes  NECK: Supple, No JVD, Normal thyroid  HEART: Regular rate and rhythm; No murmurs, rubs, or gallops  RESPIRATORY: CTA B/L, No W/R/R  ABDOMEN: Soft, Mildly tender in lower quadrant/suprapubic, Nondistended; Bowel sounds present  NEUROLOGY: A&Ox1-2, nonfocal, moving all extremities  EXTREMITIES:  2+ Peripheral Pulses, No clubbing, cyanosis, or edema  SKIN: warm, dry, normal color, no rash or abnormal lesions

## 2023-04-08 NOTE — ED ADULT NURSE REASSESSMENT NOTE - NS ED NURSE REASSESS COMMENT FT1
Pt noted to feel warm, rectal temp obtained and elevated reading noted. ACP called ([id number]), spoke with Sebastian. Made aware of elevated rectal temp (103.2). Temp charted in flowsheet. Sebastian states putting Ofirmev order in, awaiting order. Pt noted to feel warm, rectal temp obtained and elevated reading noted. ACP called ([id number]), spoke with Sebastian. Made aware of elevated rectal temp (103.2). Temp charted in flowsheet. Daughter at bedside. Sebastian states putting Ofirmev order in, awaiting order.

## 2023-04-08 NOTE — H&P ADULT - ATTENDING COMMENTS
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  In ED, she was febrile to 103.2F, WBC 12.  Exam: minimal suprapubic ttp, lungs clear, no ruq ttp    A/P:  # sepsis due to /GI source: UA mild pyuria, likely UTI, Bcx +GNR  inc Ceftriaxone to 2g qd, repeat cultures q48h until bacteremia clearance,   imaging (CT, US) unremarkable other than pneumobilia and L solitary kidney, no hydro/stone/pyelo  give IVF LR 1L and then 100cc/h x10h  if culture remains positive then consult advanced GI as pt with hx pancreatic duct stricture and intermittent RUQ pain 82F with h/o htn, dementia, L solitary kidney s/p remote R nephrectomy in [place], ERCP w/ stent for papillary pancreatic duct stenosis, stent removed 2/21/23, p/w lethargy and poor po intake, found to be septic, also endorses intermittent ruq pain and dysuria few days ago.  In ED, she was febrile to 103.2F, WBC 12.  Exam: minimal suprapubic ttp, lungs clear, no ruq ttp    A/P:  # sepsis due to /GI source: UA mild pyuria, likely UTI, Bcx +GNR--> speciated to ESBL E.coli  switch abx to Ertapenem 1 g qd, repeat cultures q48h until bacteremia clearance,   imaging (CT, US) unremarkable other than pneumobilia and L solitary kidney, no hydro/stone/pyelo  give IVF LR 1L and then 100cc/h x10h  if culture remains positive then consult advanced GI as pt with hx pancreatic duct stricture and intermittent RUQ pain  Case d/w house staff and daughter at bedside.

## 2023-04-08 NOTE — H&P ADULT - NSHPLABSRESULTS_GEN_ALL_CORE
.  LABS:                         11.1   12.13 )-----------( 197      ( 2023 06:10 )             36.0     04-08    141  |  104  |  22  ----------------------------<  141<H>  3.9   |  25  |  1.11    Ca    10.1      2023 06:10  Phos  2.6     04-08  Mg     2.00     04-08    TPro  7.2  /  Alb  3.5  /  TBili  0.8  /  DBili  x   /  AST  24  /  ALT  11  /  AlkPhos  69  04-08    PT/INR - ( 2023 20:53 )   PT: 14.4 sec;   INR: 1.24 ratio         PTT - ( 2023 20:53 )  PTT:25.0 sec  Urinalysis Basic - ( 2023 02:00 )    Color: Light Yellow / Appearance: Clear / S.034 / pH: x  Gluc: x / Ketone: Negative  / Bili: Negative / Urobili: <2 mg/dL   Blood: x / Protein: 100 mg/dL / Nitrite: Negative   Leuk Esterase: Small / RBC: 32 /HPF / WBC 45 /HPF   Sq Epi: x / Non Sq Epi: x / Bacteria: Negative            RADIOLOGY, EKG & ADDITIONAL TESTS: Reviewed.     < from: US Abdomen Limited (23 @ 03:28) >    TECHNIQUE: Real-time transabdominal scanning performed with images   obtained of the region of the liver, pancreas, gallbladder, common bile   duct, and intrahepatic ducts.    COMPARISON: CT abdomen pelvis 2023 and 2022    FINDINGS:    Pneumobilia less well demonstrated compared tothe earlier CT abdomen and   pelvis examination.    No evidence of cholelithiasis. No wall thickening. No pericholecystic   fluid. Negative sonographic Carbajal's sign.    No biliary ductal dilatation. CBD 3.9mm.    Visualized portions of pancreas are normal.    IMPRESSION:    Pneumobilia less well demonstrated compared to the earlier CT abdomen and   pelvis examination.    No gallstones.    < end of copied text >    < from: CT Abdomen and Pelvis w/ IV Cont (23 @ 23:49) >        < end of copied text >

## 2023-04-08 NOTE — H&P ADULT - PATIENT'S GENDER IDENTITY
Female Complex Repair And Single Advancement Flap Text: The defect edges were debeveled with a #15 scalpel blade.  The primary defect was closed partially with a complex linear closure.  Given the location of the remaining defect, shape of the defect and the proximity to free margins a single advancement flap was deemed most appropriate for complete closure of the defect.  Using a sterile surgical marker, an appropriate advancement flap was drawn incorporating the defect and placing the expected incisions within the relaxed skin tension lines where possible.    The area thus outlined was incised deep to adipose tissue with a #15 scalpel blade.  The skin margins were undermined to an appropriate distance in all directions utilizing iris scissors.

## 2023-04-08 NOTE — ED ADULT NURSE REASSESSMENT NOTE - NS ED NURSE REASSESS COMMENT FT1
Straight catheterization performed as ordered, size 16 used. Tolerated well. Urine sent as ordered. Respirations even and unlabored. No acute distress noted. NSR on bedside cardiac monitor. Pt cleaned up and repositioned for comfort. Daughter at bedside. Bed in lowest position, wheels locked, safety maintained. Awaiting bed assignment as per admission orders. Straight catheterization performed as ordered, size 16 used. Tolerated well. Urine sent as ordered. Respirations even and unlabored. No acute distress noted. NSR on bedside cardiac monitor. Pt cleaned up and repositioned for comfort. Daughter at bedside. Bed in lowest position, wheels locked, safety maintained. Awaiting further orders.

## 2023-04-08 NOTE — H&P ADULT - NSVTERISKREFERASSESS_GEN_ALL_CORE
Refer to the Assessment tab to view/cancel completed assessment. You can access the FollowMyHealth Patient Portal offered by [facility] by registering at the following website: [email or web address]. By joining [employer]’s FollowMyHealth portal, you will also be able to view your health information using other applications (apps) compatible with our system.

## 2023-04-08 NOTE — PHYSICAL THERAPY INITIAL EVALUATION ADULT - PERTINENT HX OF CURRENT PROBLEM, REHAB EVAL
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 As per chart, history received from daughter and HCP at bedside. Per report patient was at her baseline until Thursday 4/6. Friday 4/7 she did not eat her breakfast and began to progressively get weaker and more disoriented. Patient did not eat all day and increase in lethargy prompted visit to [facility].

## 2023-04-08 NOTE — H&P ADULT - HISTORY OF PRESENT ILLNESS
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  Ms. 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 During bedside examination patient is AOx1-2. History received from daughter and HCP at bedside. Per report patient was at her baseline until Thursday 4/6. Friday 4/7 she did not eat her breakfast and began to progressively get weaker and more disoriented. Patient did not eat all day and increase in lethargy [facility]. Pt's daughter reports she recently had an ERCP for pancreatic ductal strictures in October 2022. Stent was placed and was subsequently removed Feb 2022. Patient has had some mild intermittent abdominal pain since the ERCP but has not noticed any patterns. Denies post-prandial abd pain or positional pain. Pt did not have any fevers/chills at home per daughter. Pt does have urinary incontinence and has been diagnosed w/ UTI's in the past requiring antibiotic treatments.     Pt presented to the ED w/ 's/60's. Febrile to 103.4F, leukocytosis. Pt was given tylenol and started on CTX for presumed UTI. BCx and UCx drawn.

## 2023-04-08 NOTE — PHYSICAL THERAPY INITIAL EVALUATION ADULT - PLANNED THERAPY INTERVENTIONS, PT EVAL
Patient left positioned for safety in bed in NAD, call bell in reach, all lines intact. +bed alarm. Daughter at bedside./balance training/bed mobility training/gait training/strengthening/transfer training

## 2023-04-09 ENCOUNTER — TRANSCRIPTION ENCOUNTER (OUTPATIENT)
Age: 83
End: 2023-04-09

## 2023-04-09 LAB
ANION GAP SERPL CALC-SCNC: 10 MMOL/L — SIGNIFICANT CHANGE UP (ref 7–14)
BASOPHILS # BLD AUTO: 0.02 K/UL — SIGNIFICANT CHANGE UP (ref 0–0.2)
BASOPHILS NFR BLD AUTO: 0.2 % — SIGNIFICANT CHANGE UP (ref 0–2)
BUN SERPL-MCNC: 17 MG/DL — SIGNIFICANT CHANGE UP (ref 7–23)
CALCIUM SERPL-MCNC: 10.2 MG/DL — SIGNIFICANT CHANGE UP (ref 8.4–10.5)
CHLORIDE SERPL-SCNC: 105 MMOL/L — SIGNIFICANT CHANGE UP (ref 98–107)
CO2 SERPL-SCNC: 25 MMOL/L — SIGNIFICANT CHANGE UP (ref 22–31)
CREAT SERPL-MCNC: 1 MG/DL — SIGNIFICANT CHANGE UP (ref 0.5–1.3)
EGFR: 56 ML/MIN/1.73M2 — LOW
EOSINOPHIL # BLD AUTO: 0.08 K/UL — SIGNIFICANT CHANGE UP (ref 0–0.5)
EOSINOPHIL NFR BLD AUTO: 0.9 % — SIGNIFICANT CHANGE UP (ref 0–6)
GLUCOSE SERPL-MCNC: 106 MG/DL — HIGH (ref 70–99)
HCT VFR BLD CALC: 36.6 % — SIGNIFICANT CHANGE UP (ref 34.5–45)
HGB BLD-MCNC: 11.2 G/DL — LOW (ref 11.5–15.5)
IANC: 7.1 K/UL — SIGNIFICANT CHANGE UP (ref 1.8–7.4)
IMM GRANULOCYTES NFR BLD AUTO: 0.3 % — SIGNIFICANT CHANGE UP (ref 0–0.9)
LYMPHOCYTES # BLD AUTO: 0.86 K/UL — LOW (ref 1–3.3)
LYMPHOCYTES # BLD AUTO: 9.9 % — LOW (ref 13–44)
MAGNESIUM SERPL-MCNC: 1.9 MG/DL — SIGNIFICANT CHANGE UP (ref 1.6–2.6)
MCHC RBC-ENTMCNC: 27.9 PG — SIGNIFICANT CHANGE UP (ref 27–34)
MCHC RBC-ENTMCNC: 30.6 GM/DL — LOW (ref 32–36)
MCV RBC AUTO: 91.3 FL — SIGNIFICANT CHANGE UP (ref 80–100)
MONOCYTES # BLD AUTO: 0.57 K/UL — SIGNIFICANT CHANGE UP (ref 0–0.9)
MONOCYTES NFR BLD AUTO: 6.6 % — SIGNIFICANT CHANGE UP (ref 2–14)
NEUTROPHILS # BLD AUTO: 7.1 K/UL — SIGNIFICANT CHANGE UP (ref 1.8–7.4)
NEUTROPHILS NFR BLD AUTO: 82.1 % — HIGH (ref 43–77)
NRBC # BLD: 0 /100 WBCS — SIGNIFICANT CHANGE UP (ref 0–0)
NRBC # FLD: 0 K/UL — SIGNIFICANT CHANGE UP (ref 0–0)
PHOSPHATE SERPL-MCNC: 2.1 MG/DL — LOW (ref 2.5–4.5)
PLATELET # BLD AUTO: 176 K/UL — SIGNIFICANT CHANGE UP (ref 150–400)
POTASSIUM SERPL-MCNC: 3.8 MMOL/L — SIGNIFICANT CHANGE UP (ref 3.5–5.3)
POTASSIUM SERPL-SCNC: 3.8 MMOL/L — SIGNIFICANT CHANGE UP (ref 3.5–5.3)
RBC # BLD: 4.01 M/UL — SIGNIFICANT CHANGE UP (ref 3.8–5.2)
RBC # FLD: 13.5 % — SIGNIFICANT CHANGE UP (ref 10.3–14.5)
SODIUM SERPL-SCNC: 140 MMOL/L — SIGNIFICANT CHANGE UP (ref 135–145)
WBC # BLD: 8.66 K/UL — SIGNIFICANT CHANGE UP (ref 3.8–10.5)
WBC # FLD AUTO: 8.66 K/UL — SIGNIFICANT CHANGE UP (ref 3.8–10.5)

## 2023-04-09 PROCEDURE — 99232 SBSQ HOSP IP/OBS MODERATE 35: CPT | Mod: GC

## 2023-04-09 RX ORDER — POLYETHYLENE GLYCOL 3350 17 G/17G
17 POWDER, FOR SOLUTION ORAL DAILY
Refills: 0 | Status: DISCONTINUED | OUTPATIENT
Start: 2023-04-09 | End: 2023-04-11

## 2023-04-09 RX ADMIN — DORZOLAMIDE HYDROCHLORIDE 1 DROP(S): 20 SOLUTION/ DROPS OPHTHALMIC at 14:00

## 2023-04-09 RX ADMIN — DORZOLAMIDE HYDROCHLORIDE 1 DROP(S): 20 SOLUTION/ DROPS OPHTHALMIC at 05:41

## 2023-04-09 RX ADMIN — ERTAPENEM SODIUM 120 MILLIGRAM(S): 1 INJECTION, POWDER, LYOPHILIZED, FOR SOLUTION INTRAMUSCULAR; INTRAVENOUS at 12:28

## 2023-04-09 RX ADMIN — DORZOLAMIDE HYDROCHLORIDE 1 DROP(S): 20 SOLUTION/ DROPS OPHTHALMIC at 21:52

## 2023-04-09 RX ADMIN — ENOXAPARIN SODIUM 40 MILLIGRAM(S): 100 INJECTION SUBCUTANEOUS at 16:37

## 2023-04-09 RX ADMIN — Medication 1 DROP(S): at 14:01

## 2023-04-09 RX ADMIN — Medication 1 DROP(S): at 21:51

## 2023-04-09 RX ADMIN — POLYETHYLENE GLYCOL 3350 17 GRAM(S): 17 POWDER, FOR SOLUTION ORAL at 12:00

## 2023-04-09 NOTE — DISCHARGE NOTE PROVIDER - NSDCMRMEDTOKEN_GEN_ALL_CORE_FT
Combigan 0.2%-0.5% ophthalmic solution: 1 drop(s) to each affected eye every 12 hours (home med)  dorzolamide 2% ophthalmic solution: 1 drop(s) to each affected eye 2 times a day (home med)  losartan 25 mg oral tablet: 1 tab(s) orally once a day  Multi Vitamin+: orally once a day/ LD on 02/15/23  Restasis 0.05% ophthalmic emulsion: 1 drop(s) in each eye every 12 hours  rosuvastatin 20 mg oral tablet: 1 tab(s) orally once a day (at bedtime)   Combigan 0.2%-0.5% ophthalmic solution: 1 drop(s) to each affected eye every 12 hours (home med)  dorzolamide 2% ophthalmic solution: 1 drop(s) to each affected eye 2 times a day (home med)  Ertapenem 1g q24 until 4/14/23: Daily  losartan 25 mg oral tablet: 1 tab(s) orally once a day  Multi Vitamin+: orally once a day/ LD on 02/15/23  Restasis 0.05% ophthalmic emulsion: 1 drop(s) in each eye every 12 hours  rosuvastatin 20 mg oral tablet: 1 tab(s) orally once a day (at bedtime)   Combigan 0.2%-0.5% ophthalmic solution: 1 drop(s) to each affected eye every 12 hours (home med)  dorzolamide 2% ophthalmic solution: 1 drop(s) to each affected eye 2 times a day (home med)  Ertapenem 1g q24 until 4/15/23: Daily  losartan 25 mg oral tablet: 1 tab(s) orally once a day  Multi Vitamin+: orally once a day/ LD on 02/15/23  Restasis 0.05% ophthalmic emulsion: 1 drop(s) in each eye every 12 hours  rosuvastatin 20 mg oral tablet: 1 tab(s) orally once a day (at bedtime)

## 2023-04-09 NOTE — DISCHARGE NOTE PROVIDER - NSDCCPTREATMENT_GEN_ALL_CORE_FT
PRINCIPAL PROCEDURE  Procedure: CT abdomen pelvis wo/w con  Findings and Treatment: CT of the Abdomen and Pelvis was performed.  Sagittal and coronal reformats were performed.  FINDINGS:  LOWER CHEST: Within normal limits.  LIVER: Within normal limits.  BILE DUCTS: Pneumobilia present and new since prior exam.  GALLBLADDER: Within normal limits.  SPLEEN: Within normal limits.  PANCREAS: Within normal limits.  ADRENALS: Within normal limits.  KIDNEYS/URETERS: Right kidney absent. Left kidney without hydronephrosis   or stones. Stable left peripelvic renal cyst. Stable simple cyst upper   pole left kidney measuring 10 mm.  BLADDER: Within normal limits.  REPRODUCTIVE ORGANS: Hysterectomy.  Diverticulosis without acute diverticulitis.  BOWEL: No bowel obstruction. Appendix is normal.  PERITONEUM: No ascites.  No free air or abscess.  VESSELS: Atherosclerotic changes.  RETROPERITONEUM/LYMPH NODES: No lymphadenopathy.  ABDOMINAL WALL: Within normal limits.  BONES: Degenerative changes.Stable heterogeneous sclerosis of the right   iliac bone. Stable heterogeneous appearance of the thoracolumbar spine.   No evidence of new lytic or blastic process.  IMPRESSION:   Pneumobilia present and new since prior exam. May reflect interval   sphincterotomy.  Right kidney absent. Left kidney without hydronephrosis or stones. Stable   left peripelvic renal cyst.  Normal appendix.  Stable heterogeneous sclerosis of the right iliac bone. Stable   heterogeneous appearance of the thoracolumbar spine. No evidence of new   lytic or blastic process.

## 2023-04-09 NOTE — DISCHARGE NOTE PROVIDER - NSDCFUADDAPPT_GEN_ALL_CORE_FT
1) Please follow up with your PCP. If you need a new PCP please Please make an appointment with General Internal Medicine, [address] [place] [address], [address], [place] [address]. Please call [phone] to make an appointment

## 2023-04-09 NOTE — PROGRESS NOTE ADULT - SUBJECTIVE AND OBJECTIVE BOX
Mekhi Del Castillo, PGY1    DERRICK LEON  82y  Female    Complaints:  Subjective:     No acute o/n events. Pt denies subj fevers/chills, HA, dizziness, chest pain, palpitations, n/v, abd pain, dysuria, diarrhea      FAMILY HISTORY:  FH: HTN (hypertension) (Father)      82yVital Signs Last 24 Hrs  T(C): 36.3 (09 Apr 2023 04:35), Max: 37.4 (08 Apr 2023 15:00)  T(F): 97.4 (09 Apr 2023 04:35), Max: 99.3 (08 Apr 2023 15:00)  HR: 71 (09 Apr 2023 04:35) (54 - 71)  BP: 143/60 (09 Apr 2023 04:35) (130/71 - 158/59)  BP(mean): --  RR: 17 (09 Apr 2023 04:35) (17 - 17)  SpO2: 100% (09 Apr 2023 04:35) (100% - 100%)    Parameters below as of 09 Apr 2023 04:35  Patient On (Oxygen Delivery Method): room air          PHYSICAL EXAM:  GENERAL: NAD, well-groomed, well-developed  HEAD:  Atraumatic, Normocephalic  EYES: EOMI, PERRLA, conjunctiva and sclera clear  ENMT: No tonsillar erythema, exudates, or enlargement; Moist mucous membranes, Good dentition, No lesions  NECK: Supple, No JVD, Normal thyroid  NERVOUS SYSTEM:  Alert & Oriented X3, Good concentration; Motor Strength 5/5 B/L upper and lower extremities; DTRs 2+ intact and symmetric  CHEST/LUNG: Clear to percussion bilaterally; No rales, rhonchi, wheezing, or rubs  HEART: Regular rate and rhythm; No murmurs, rubs, or gallops  ABDOMEN: Soft, Nontender, Nondistended; Bowel sounds present  EXTREMITIES:  2+ Peripheral Pulses, No clubbing, cyanosis, or edema  LYMPH: No lymphadenopathy noted  SKIN: No rashes or lesions      Consultant(s) Notes Reviewed:  [x ] YES  [ ] NO  Care Discussed with Consultants/Other Providers [ x] YES  [ ] NO    LABS:                Female  RADIOLOGY & ADDITIONAL TESTS:    Imaging Personally Reviewed:  [ ] YES  [ ] NO    MedsMEDICATIONS  (STANDING):  artificial  tears Solution 1 Drop(s) Both EYES three times a day  dorzolamide 2% Ophthalmic Solution 1 Drop(s) Both EYES three times a day  enoxaparin Injectable 40 milliGRAM(s) SubCutaneous every 24 hours  ertapenem  IVPB 1000 milliGRAM(s) IV Intermittent every 24 hours  lactated ringers. 1000 milliLiter(s) (100 mL/Hr) IV Continuous <Continuous>    MEDICATIONS  (PRN):  acetaminophen     Tablet .. 650 milliGRAM(s) Oral every 6 hours PRN Temp greater or equal to 38C (100.4F), Mild Pain (1 - 3)  melatonin 3 milliGRAM(s) Oral at bedtime PRN Insomnia      HEALTH ISSUES - PROBLEM Dx:  Sepsis    Dementia    Hypertension    Prophylactic measure                   Mekhi Del Castillo, PGY1    DERRICK LEON  82y  Female    Complaints:  Subjective:     No acute o/n events. Now AOx3. Mild left sided abd pain. Pt denies subj fevers/chills, HA, dizziness, chest pain, palpitations, n/v. dysuria, diarrhea      FAMILY HISTORY:  FH: HTN (hypertension) (Father)      82yVital Signs Last 24 Hrs  T(C): 36.3 (2023 04:35), Max: 37.4 (2023 15:00)  T(F): 97.4 (2023 04:35), Max: 99.3 (2023 15:00)  HR: 71 (2023 04:35) (54 - 71)  BP: 143/60 (2023 04:35) (130/71 - 158/59)  BP(mean): --  RR: 17 (2023 04:35) (17 - 17)  SpO2: 100% (2023 04:35) (100% - 100%)    Parameters below as of 2023 04:35  Patient On (Oxygen Delivery Method): room air          PHYSICAL EXAM:  GENERAL: NAD, well-groomed, well-developed  HEAD:  Atraumatic, Normocephalic  EYES: EOMI, PERRLA, conjunctiva and sclera clear  ENMT: No tonsillar erythema, exudates, or enlargement; Moist mucous membranes, Good dentition, No lesions  NECK: Supple, No JVD, Normal thyroid  NERVOUS SYSTEM:  Alert & Oriented X3, Good concentration; Motor Strength 5/5 B/L upper and lower extremities; DTRs 2+ intact and symmetric  CHEST/LUNG: Clear to percussion bilaterally; No rales, rhonchi, wheezing, or rubs  HEART: Regular rate and rhythm; No murmurs, rubs, or gallops  ABDOMEN: Soft, Nontender, Nondistended; Bowel sounds present  EXTREMITIES:  2+ Peripheral Pulses, No clubbing, cyanosis, or edema  LYMPH: No lymphadenopathy noted  SKIN: No rashes or lesions      Consultant(s) Notes Reviewed:  [x ] YES  [ ] NO  Care Discussed with Consultants/Other Providers [ x] YES  [ ] NO    LABS:                            11.2   8.66  )-----------( 176      ( 2023 06:45 )             36.6       04-    140  |  105  |  17  ----------------------------<  106<H>  3.8   |  25  |  1.00    Ca    10.2      2023 06:45  Phos  2.1     -  Mg     1.90     -    TPro  7.2  /  Alb  3.5  /  TBili  0.8  /  DBili  x   /  AST  24  /  ALT  11  /  AlkPhos  69                Urinalysis Basic - ( 2023 02:00 )    Color: Light Yellow / Appearance: Clear / S.034 / pH: x  Gluc: x / Ketone: Negative  / Bili: Negative / Urobili: <2 mg/dL   Blood: x / Protein: 100 mg/dL / Nitrite: Negative   Leuk Esterase: Small / RBC: 32 /HPF / WBC 45 /HPF   Sq Epi: x / Non Sq Epi: x / Bacteria: Negative        PT/INR - ( 2023 20:53 )   PT: 14.4 sec;   INR: 1.24 ratio         PTT - ( 2023 20:53 )  PTT:25.0 sec          CAPILLARY BLOOD GLUCOSE          Female  RADIOLOGY & ADDITIONAL TESTS:    Imaging Personally Reviewed:  [ ] YES  [ ] NO    MedsMEDICATIONS  (STANDING):  artificial  tears Solution 1 Drop(s) Both EYES three times a day  dorzolamide 2% Ophthalmic Solution 1 Drop(s) Both EYES three times a day  enoxaparin Injectable 40 milliGRAM(s) SubCutaneous every 24 hours  ertapenem  IVPB 1000 milliGRAM(s) IV Intermittent every 24 hours  lactated ringers. 1000 milliLiter(s) (100 mL/Hr) IV Continuous <Continuous>    MEDICATIONS  (PRN):  acetaminophen     Tablet .. 650 milliGRAM(s) Oral every 6 hours PRN Temp greater or equal to 38C (100.4F), Mild Pain (1 - 3)  melatonin 3 milliGRAM(s) Oral at bedtime PRN Insomnia      HEALTH ISSUES - PROBLEM Dx:  Sepsis    Dementia    Hypertension    Prophylactic measure

## 2023-04-09 NOTE — PROGRESS NOTE ADULT - ATTENDING COMMENTS
Sepsis present on admission likely d/t UTI, c/w Ertapenem, f/up cultures   Abdominal pain possibly d/t constipation, CT A/P and abd US w/ no significant acute findings (pneumobilia likely related to recent ERCP, start bowel regimen   HTN, holding ARB, monitor BP  Dementia, avoid sedating agents, no longer on Donepezil as per daughter Sepsis present on admission likely d/t UTI, c/w Ertapenem, f/up cultures   Abdominal pain possibly d/t constipation, CT A/P and abd US w/ no significant acute findings (pneumobilia likely related to recent ERCP, start bowel regimen   HTN, holding ARB, monitor BP and restart ARB if remains stable   Dementia, avoid sedating agents, no longer on Donepezil as per daughter

## 2023-04-09 NOTE — PROGRESS NOTE ADULT - ASSESSMENT
82F w/ PMH of HTN, dementia (baseline AOx2-3, ambulatory without any assistance) presenting w/ lethargy and decreased PO intake found to be septic with suspected source of infection to be  vs (less likely) biliary iso recent ERCP

## 2023-04-09 NOTE — DISCHARGE NOTE PROVIDER - CARE PROVIDER_API CALL
[facility]  Internal Medicine  [address] [place] [address]  Phone: [phone]  Fax: [phone]  Established Patient  Follow Up Time:

## 2023-04-09 NOTE — DISCHARGE NOTE PROVIDER - NSFOLLOWUPCLINICS_GEN_ALL_ED_FT
[facility] - Primary Care  Primary Care  [address] [place] [address]  Phone: [phone]  Fax:   Follow Up Time: 2 weeks

## 2023-04-09 NOTE — DISCHARGE NOTE PROVIDER - NSDCCPCAREPLAN_GEN_ALL_CORE_FT
PRINCIPAL DISCHARGE DIAGNOSIS  Diagnosis: Urinary tract infection  Assessment and Plan of Treatment: You came in with weakness and decreased oral intake. You were found to have an infection in your urine. The infection was with a bacteria called E coli. Specifically the strain of E coli you were infected with was resistent to many different antibiotics and you had to be on a special antibiotic called ertapenum. Additionally, your infection spread past your urinary tract and became systemic, entering your blood stream. You became bacteremic which caused some of your confusion and weakness. You improved daily on antibiotives through the IV. On discharge you will need to take __________ to complete your treatment.      SECONDARY DISCHARGE DIAGNOSES  Diagnosis: Adult failure to thrive  Assessment and Plan of Treatment:      PRINCIPAL DISCHARGE DIAGNOSIS  Diagnosis: Urinary tract infection  Assessment and Plan of Treatment: You came in with weakness and decreased oral intake. You were found to have an infection in your urine. The infection was with a bacteria called E coli. Specifically the strain of E coli you were infected with was resistent to many different antibiotics and you had to be on a special antibiotic called ertapenum. Additionally, your infection spread past your urinary tract and became systemic, entering your blood stream. You became bacteremic which caused some of your confusion and weakness. You improved daily on antibiotics through the IV. On discharge you will need to take __________ to complete your treatment.      SECONDARY DISCHARGE DIAGNOSES  Diagnosis: Adult failure to thrive  Assessment and Plan of Treatment:      PRINCIPAL DISCHARGE DIAGNOSIS  Diagnosis: Urinary tract infection  Assessment and Plan of Treatment: You came in with weakness and decreased oral intake. You were found to have an infection in your urine. The infection was with a bacteria called E coli. Specifically the strain of E coli you were infected with was resistent to many different antibiotics and you had to be on a special antibiotic called ertapenum. Additionally, your infection spread past your urinary tract and became systemic, entering your blood stream. You became bacteremic which caused some of your confusion and weakness. You improved daily on antibiotics through the IV. On discharge you will need to take 7 total days of antibiotics through the IV to complete your treatment. Please follow-up with your primary care physician within 2 weeks of leaving the hospital to have repeat blood work sent and to check your blood pressure. Please return to the Emergency Department if you feel any shortness of breath, weakness, or altered mental status.

## 2023-04-09 NOTE — DISCHARGE NOTE PROVIDER - HOSPITAL COURSE
82F w/ PMHx of HTN, dementia (baseline mental status A&O 2-3, ambulatory without any assistance) presented with decreased PO and increased lethargy, dysuria, chills. Found to have borderline +UA and fever in the ER with mildly elevated WBC, started on ceftriaxone empirically, blood cultures now positive for ESBL E. coli so on ertapenem, improving daily. Source UTI as UCx also growing Ecoli. Pt's abdominal CT w/o any findings. Pt had mild abdominal pain which __________?improved w/ bowel regimen?___ 82F w/ PMHx of HTN, dementia (baseline mental status A&O 2-3, ambulatory without any assistance) presented with decreased PO and increased lethargy, dysuria, chills. Found to have borderline +UA and fever in the ER with mildly elevated WBC, started on ceftriaxone empirically, blood cultures now positive for ESBL E. coli so on ertapenem, improving daily. Source UTI as UCx also growing ESBL Ecoli. Pt's abdominal CT w/o any findings. Pt had mild abdominal pain which improved w/ bowel regimen. Repeat BCx shows ...   Patient is to be discharged with ... days of Ertapenem. On day of discharge, patient was deemed medically stable for discharge. 82F w/ PMHx of HTN, dementia (baseline mental status A&O 2-3, ambulatory without any assistance) presented with decreased PO and increased lethargy, dysuria, chills. Found to have borderline +UA and fever in the ER with mildly elevated WBC, started on ceftriaxone empirically, blood cultures now positive for ESBL E. coli so on ertapenem, improving daily. Source UTI as UCx also growing ESBL Ecoli. Pt's abdominal CT w/o any findings. Pt had mild abdominal pain which improved w/ bowel regimen. Repeat BCx was sent. Patient was found to have significant improvement after treatment was initiated. During admission, BMP was also notable for elevated calcium level to 11.0. Vitamin D and PTH levels were sent. Patient's losartan was also held during this admission due to concern for hypotension 2/2 sepsis.   Patient is to be discharged with 7 days of Ertapenem from 4/8/2023-4/15/2023. On day of discharge, patient was deemed medically stable for discharge.     Follow-up:   - repeat BMP to trend calcium and monitor potassium after restarting Losartan 82F w/ PMHx of HTN, dementia (baseline mental status A&O 2-3, ambulatory without any assistance) presented with decreased PO and increased lethargy, dysuria, chills. Found to have borderline +UA and fever in the ER with mildly elevated WBC, started on ceftriaxone empirically, blood cultures now positive for ESBL E. coli so on ertapenem, improving daily. Source UTI as UCx also growing ESBL Ecoli. Pt's abdominal CT w/o any findings. Pt had mild abdominal pain which improved w/ bowel regimen. Repeat BCx was sent. Patient was found to have significant improvement after treatment was initiated. During admission, BMP was also notable for elevated calcium level to 11.0. Vitamin D and PTH levels were sent. Patient's losartan was also held during this admission due to concern for hypotension 2/2 sepsis.   Patient is to be discharged with 7 days of Ertapenem from 4/8/2023-4/15/2023. On day of discharge, patient was deemed medically stable for discharge. Repeat blood culture NGTD.    Follow-up:   - repeat BMP to trend calcium and monitor potassium after restarting Losartan

## 2023-04-09 NOTE — PROGRESS NOTE ADULT - PROBLEM SELECTOR PLAN 2
Patient baseline AOx2-3. Dependent on ADL's but ambulatory w/o assistance  - Planned outpatient workup with neuro in May.

## 2023-04-09 NOTE — DISCHARGE NOTE PROVIDER - NSDCFUSCHEDAPPT_GEN_ALL_CORE_FT
Cristian Neri, [facility]  [facility] [phone]  Scheduled Appointment: 04/25/2023

## 2023-04-09 NOTE — PROGRESS NOTE ADULT - PROBLEM SELECTOR PLAN 1
Patient found to be febrile to 103.4F w/ leukocytosis WBC=12.1.   - Source of infection: likely urinary vs less likely biliary iso pneumobilia after recent ERCP. No diarrhea. CXR clear. No skin lesions.  - Infectious w/u: f/u BCx, UCx  - Will c/w CTX 1g qd for now for presumed UTI.   - Tylenol PRN for fever  - Continue to monitor fever/WBC curve Patient found to be febrile to 103.4F w/ leukocytosis WBC=12.1.   - Source of infection: likely urinary   - Infectious w/u: BCx, UCx both positive for ESBL ecoli.  - c/w Ertapenum given ESBL  - Tylenol PRN for fever  - Continue to monitor fever/WBC curve

## 2023-04-10 LAB
-  AMIKACIN: SIGNIFICANT CHANGE UP
-  AMPICILLIN/SULBACTAM: SIGNIFICANT CHANGE UP
-  AMPICILLIN: SIGNIFICANT CHANGE UP
-  AZTREONAM: SIGNIFICANT CHANGE UP
-  CEFAZOLIN: SIGNIFICANT CHANGE UP
-  CEFEPIME: SIGNIFICANT CHANGE UP
-  CEFTRIAXONE: SIGNIFICANT CHANGE UP
-  CIPROFLOXACIN: SIGNIFICANT CHANGE UP
-  ERTAPENEM: SIGNIFICANT CHANGE UP
-  GENTAMICIN: SIGNIFICANT CHANGE UP
-  IMIPENEM: SIGNIFICANT CHANGE UP
-  LEVOFLOXACIN: SIGNIFICANT CHANGE UP
-  MEROPENEM: SIGNIFICANT CHANGE UP
-  PIPERACILLIN/TAZOBACTAM: SIGNIFICANT CHANGE UP
-  TOBRAMYCIN: SIGNIFICANT CHANGE UP
-  TRIMETHOPRIM/SULFAMETHOXAZOLE: SIGNIFICANT CHANGE UP
ANION GAP SERPL CALC-SCNC: 11 MMOL/L — SIGNIFICANT CHANGE UP (ref 7–14)
ANION GAP SERPL CALC-SCNC: 8 MMOL/L — SIGNIFICANT CHANGE UP (ref 7–14)
BUN SERPL-MCNC: 21 MG/DL — SIGNIFICANT CHANGE UP (ref 7–23)
BUN SERPL-MCNC: 22 MG/DL — SIGNIFICANT CHANGE UP (ref 7–23)
CALCIUM SERPL-MCNC: 10.5 MG/DL — SIGNIFICANT CHANGE UP (ref 8.4–10.5)
CALCIUM SERPL-MCNC: 10.7 MG/DL — HIGH (ref 8.4–10.5)
CHLORIDE SERPL-SCNC: 103 MMOL/L — SIGNIFICANT CHANGE UP (ref 98–107)
CHLORIDE SERPL-SCNC: 105 MMOL/L — SIGNIFICANT CHANGE UP (ref 98–107)
CO2 SERPL-SCNC: 23 MMOL/L — SIGNIFICANT CHANGE UP (ref 22–31)
CO2 SERPL-SCNC: 27 MMOL/L — SIGNIFICANT CHANGE UP (ref 22–31)
CREAT SERPL-MCNC: 1.03 MG/DL — SIGNIFICANT CHANGE UP (ref 0.5–1.3)
CREAT SERPL-MCNC: 1.06 MG/DL — SIGNIFICANT CHANGE UP (ref 0.5–1.3)
CULTURE RESULTS: SIGNIFICANT CHANGE UP
CULTURE RESULTS: SIGNIFICANT CHANGE UP
EGFR: 52 ML/MIN/1.73M2 — LOW
EGFR: 54 ML/MIN/1.73M2 — LOW
GLUCOSE SERPL-MCNC: 104 MG/DL — HIGH (ref 70–99)
GLUCOSE SERPL-MCNC: 104 MG/DL — HIGH (ref 70–99)
HCT VFR BLD CALC: 37.3 % — SIGNIFICANT CHANGE UP (ref 34.5–45)
HGB BLD-MCNC: 11.3 G/DL — LOW (ref 11.5–15.5)
MAGNESIUM SERPL-MCNC: 2.1 MG/DL — SIGNIFICANT CHANGE UP (ref 1.6–2.6)
MAGNESIUM SERPL-MCNC: 2.2 MG/DL — SIGNIFICANT CHANGE UP (ref 1.6–2.6)
MCHC RBC-ENTMCNC: 27.6 PG — SIGNIFICANT CHANGE UP (ref 27–34)
MCHC RBC-ENTMCNC: 30.3 GM/DL — LOW (ref 32–36)
MCV RBC AUTO: 91.2 FL — SIGNIFICANT CHANGE UP (ref 80–100)
METHOD TYPE: SIGNIFICANT CHANGE UP
NRBC # BLD: 0 /100 WBCS — SIGNIFICANT CHANGE UP (ref 0–0)
NRBC # FLD: 0 K/UL — SIGNIFICANT CHANGE UP (ref 0–0)
ORGANISM # SPEC MICROSCOPIC CNT: SIGNIFICANT CHANGE UP
PHOSPHATE SERPL-MCNC: 2.1 MG/DL — LOW (ref 2.5–4.5)
PHOSPHATE SERPL-MCNC: 2.3 MG/DL — LOW (ref 2.5–4.5)
PLATELET # BLD AUTO: 188 K/UL — SIGNIFICANT CHANGE UP (ref 150–400)
POTASSIUM SERPL-MCNC: 4.2 MMOL/L — SIGNIFICANT CHANGE UP (ref 3.5–5.3)
POTASSIUM SERPL-MCNC: 5.5 MMOL/L — HIGH (ref 3.5–5.3)
POTASSIUM SERPL-SCNC: 4.2 MMOL/L — SIGNIFICANT CHANGE UP (ref 3.5–5.3)
POTASSIUM SERPL-SCNC: 5.5 MMOL/L — HIGH (ref 3.5–5.3)
RBC # BLD: 4.09 M/UL — SIGNIFICANT CHANGE UP (ref 3.8–5.2)
RBC # FLD: 13.5 % — SIGNIFICANT CHANGE UP (ref 10.3–14.5)
SODIUM SERPL-SCNC: 137 MMOL/L — SIGNIFICANT CHANGE UP (ref 135–145)
SODIUM SERPL-SCNC: 140 MMOL/L — SIGNIFICANT CHANGE UP (ref 135–145)
SPECIMEN SOURCE: SIGNIFICANT CHANGE UP
SPECIMEN SOURCE: SIGNIFICANT CHANGE UP
WBC # BLD: 6.1 K/UL — SIGNIFICANT CHANGE UP (ref 3.8–10.5)
WBC # FLD AUTO: 6.1 K/UL — SIGNIFICANT CHANGE UP (ref 3.8–10.5)

## 2023-04-10 PROCEDURE — 99233 SBSQ HOSP IP/OBS HIGH 50: CPT | Mod: GC

## 2023-04-10 RX ADMIN — Medication 1 DROP(S): at 21:52

## 2023-04-10 RX ADMIN — ERTAPENEM SODIUM 120 MILLIGRAM(S): 1 INJECTION, POWDER, LYOPHILIZED, FOR SOLUTION INTRAMUSCULAR; INTRAVENOUS at 13:06

## 2023-04-10 RX ADMIN — DORZOLAMIDE HYDROCHLORIDE 1 DROP(S): 20 SOLUTION/ DROPS OPHTHALMIC at 05:39

## 2023-04-10 RX ADMIN — Medication 1 DROP(S): at 05:40

## 2023-04-10 RX ADMIN — DORZOLAMIDE HYDROCHLORIDE 1 DROP(S): 20 SOLUTION/ DROPS OPHTHALMIC at 13:11

## 2023-04-10 RX ADMIN — Medication 1 DROP(S): at 13:09

## 2023-04-10 RX ADMIN — DORZOLAMIDE HYDROCHLORIDE 1 DROP(S): 20 SOLUTION/ DROPS OPHTHALMIC at 21:51

## 2023-04-10 NOTE — PROGRESS NOTE ADULT - PROBLEM SELECTOR PLAN 1
Patient found to be febrile to 103.4F w/ leukocytosis WBC=12.1.   - Source of infection: likely urinary   - Infectious w/u: BCx, UCx both positive for ESBL ecoli.  - c/w Ertapenum given ESBL  - Tylenol PRN for fever  - Continue to monitor fever/WBC curve Patient found to be febrile to 103.4F w/ leukocytosis WBC=12.1.   - Source of infection: likely urinary   - Infectious w/u: BCx, UCx both positive for ESBL ecoli.  - c/w Ertapenum given ESBL, will plan for 7-10d course depending on blood cultures   - Tylenol PRN for fever  - Continue to monitor fever/WBC curve Patient found to be febrile to 103.4F w/ leukocytosis WBC=12.1.   - Source of infection: likely urinary   - Infectious w/u: BCx, UCx both positive for ESBL ecoli.  - c/w Ertapenum given ESBL, will plan for 7-10d course depending on blood cultures   - Tylenol PRN for fever  - Continue to monitor fever/WBC curve   - Repeat BCx in AM to monitor for clearance

## 2023-04-10 NOTE — PROGRESS NOTE ADULT - SUBJECTIVE AND OBJECTIVE BOX
PROGRESS NOTE:     Patient is a 82y old  Female who presents with a chief complaint of lethargy (09 Apr 2023 19:33)      SUBJECTIVE / OVERNIGHT EVENTS:    No acute events overnight. Patient examined at bedside with no acute complaints.     Pain:  Bowel Movements:  Urination:  OOB:  PT:    REVIEW OF SYSTEMS:    CONSTITUTIONAL: No weakness, fevers or chills  EYES/ENT: No visual changes;  No vertigo or throat pain   NECK: No pain or stiffness  RESPIRATORY: No cough, wheezing, hemoptysis; No shortness of breath  CARDIOVASCULAR: No chest pain or palpitations  GASTROINTESTINAL: No abdominal or epigastric pain. No nausea, vomiting, or hematemesis; No diarrhea or constipation. No melena or hematochezia.  GENITOURINARY: No dysuria, frequency or hematuria  NEUROLOGICAL: No numbness or weakness  SKIN: No itching, rashes      MEDICATIONS  (STANDING):  artificial  tears Solution 1 Drop(s) Both EYES three times a day  dorzolamide 2% Ophthalmic Solution 1 Drop(s) Both EYES three times a day  enoxaparin Injectable 40 milliGRAM(s) SubCutaneous every 24 hours  ertapenem  IVPB 1000 milliGRAM(s) IV Intermittent every 24 hours  lactated ringers. 1000 milliLiter(s) (100 mL/Hr) IV Continuous <Continuous>  polyethylene glycol 3350 17 Gram(s) Oral daily    MEDICATIONS  (PRN):  acetaminophen     Tablet .. 650 milliGRAM(s) Oral every 6 hours PRN Temp greater or equal to 38C (100.4F), Mild Pain (1 - 3)  melatonin 3 milliGRAM(s) Oral at bedtime PRN Insomnia      CAPILLARY BLOOD GLUCOSE        I&O's Summary      VITALS:   T(C): 36.5 (04-10-23 @ 05:41), Max: 37.4 (04-09-23 @ 21:55)  HR: 60 (04-10-23 @ 05:41) (60 - 70)  BP: 135/88 (04-10-23 @ 05:41) (135/88 - 146/68)  RR: 18 (04-10-23 @ 05:41) (18 - 18)  SpO2: 99% (04-10-23 @ 05:41) (99% - 99%)    GENERAL: NAD, lying in bed comfortably  HEAD:  Atraumatic, normocephalic  EYES: EOMI, PERRLA, conjunctiva and sclera clear  ENT: Moist mucous membranes  NECK: Supple, no JVD  HEART: Regular rate and rhythm, no murmurs, rubs, or gallops  LUNGS: Unlabored respirations.  Clear to auscultation bilaterally, no crackles, wheezing, or rhonchi  ABDOMEN: Soft, nontender, nondistended, +BS  EXTREMITIES: 2+ peripheral pulses bilaterally. No clubbing, cyanosis, or edema  NERVOUS SYSTEM:  A&Ox3, no focal deficits   SKIN: No rashes or lesions    LABS:                        11.3   6.10  )-----------( 188      ( 10 Apr 2023 06:09 )             37.3     04-09    140  |  105  |  17  ----------------------------<  106<H>  3.8   |  25  |  1.00    Ca    10.2      09 Apr 2023 06:45  Phos  2.1     04-09  Mg     1.90     04-09                Culture - Urine (collected 08 Apr 2023 02:05)  Source: Clean Catch Clean Catch (Midstream)  Preliminary Report (09 Apr 2023 12:14):    >100,000 CFU/ml Escherichia coli    Culture - Blood (collected 07 Apr 2023 21:10)  Source: .Blood Blood-Venous  Gram Stain (08 Apr 2023 13:59):    Growth in anaerobic bottle: Gram Negative Rods    Growth in aerobic bottle: Gram Negative Rods  Preliminary Report (09 Apr 2023 10:25):    Growth in aerobic and anaerobic bottles: Escherichia coli See previous    culture [id number]    Culture - Blood (collected 07 Apr 2023 20:03)  Source: .Blood Blood-Peripheral  Gram Stain (08 Apr 2023 12:44):    Growth in anaerobic bottle: Gram Negative Rods    Growth in aerobic bottle: Gram Negative Rods  Preliminary Report (09 Apr 2023 10:25):    Growth in aerobic and anaerobic bottles: Escherichia coli    ***Blood Panel PCR results on this specimen are available    approximately 3 hours after the Gram stain result.***    Gram stain, PCR, and/or culture results may not always    correspond due to difference in methodologies.    ************************************************************    This PCR assay was performed by multiplex PCR. This    Assay tests for 66 bacterial and resistance gene targets.    Please refer to the [facility] test directory    at [email or web address] for details.  Organism: Blood Culture PCR (08 Apr 2023 13:13)  Organism: Blood Culture PCR (08 Apr 2023 13:13)        RADIOLOGY & ADDITIONAL TESTS:  Results Reviewed:   Imaging Personally Reviewed:  Electrocardiogram Personally Reviewed:    COORDINATION OF CARE:  Care Discussed with Consultants/Other Providers [Y/N]:  Prior or Outpatient Records Reviewed [Y/N]:   PROGRESS NOTE:     Patient is a 82y old  Female who presents with a chief complaint of lethargy (09 Apr 2023 19:33)      SUBJECTIVE / OVERNIGHT EVENTS:    No acute events overnight. Patient examined at bedside with no acute complaints.     Pain: N/A   Bowel Movements: limited   Urination: regular   OOB: limited   PT: dispo home w/ home PT     REVIEW OF SYSTEMS:    CONSTITUTIONAL: No weakness, fevers or chills  EYES/ENT: No visual changes;  No vertigo or throat pain   NECK: No pain or stiffness  RESPIRATORY: No cough, wheezing, hemoptysis; No shortness of breath  CARDIOVASCULAR: No chest pain or palpitations  GASTROINTESTINAL: No abdominal or epigastric pain. No nausea, vomiting, or hematemesis; No diarrhea or constipation. No melena or hematochezia.  GENITOURINARY: No dysuria, frequency or hematuria  NEUROLOGICAL: No numbness or weakness  SKIN: No itching, rashes      MEDICATIONS  (STANDING):  artificial  tears Solution 1 Drop(s) Both EYES three times a day  dorzolamide 2% Ophthalmic Solution 1 Drop(s) Both EYES three times a day  enoxaparin Injectable 40 milliGRAM(s) SubCutaneous every 24 hours  ertapenem  IVPB 1000 milliGRAM(s) IV Intermittent every 24 hours  lactated ringers. 1000 milliLiter(s) (100 mL/Hr) IV Continuous <Continuous>  polyethylene glycol 3350 17 Gram(s) Oral daily    MEDICATIONS  (PRN):  acetaminophen     Tablet .. 650 milliGRAM(s) Oral every 6 hours PRN Temp greater or equal to 38C (100.4F), Mild Pain (1 - 3)  melatonin 3 milliGRAM(s) Oral at bedtime PRN Insomnia      CAPILLARY BLOOD GLUCOSE        I&O's Summary      VITALS:   T(C): 36.5 (04-10-23 @ 05:41), Max: 37.4 (04-09-23 @ 21:55)  HR: 60 (04-10-23 @ 05:41) (60 - 70)  BP: 135/88 (04-10-23 @ 05:41) (135/88 - 146/68)  RR: 18 (04-10-23 @ 05:41) (18 - 18)  SpO2: 99% (04-10-23 @ 05:41) (99% - 99%)    GENERAL: NAD, lying in bed comfortably  HEAD:  Atraumatic, normocephalic  EYES: EOMI, PERRLA, conjunctiva and sclera clear  ENT: Moist mucous membranes  NECK: Supple, no JVD  HEART: Regular rate and rhythm, no murmurs, rubs, or gallops  LUNGS: Unlabored respirations.  Clear to auscultation bilaterally, no crackles, wheezing, or rhonchi  ABDOMEN: Soft, nontender, nondistended, +BS  EXTREMITIES: 2+ peripheral pulses bilaterally. No clubbing, cyanosis, or edema  NERVOUS SYSTEM:  A&Ox3, no focal deficits   SKIN: No rashes or lesions    LABS:                        11.3   6.10  )-----------( 188      ( 10 Apr 2023 06:09 )             37.3     04-09    140  |  105  |  17  ----------------------------<  106<H>  3.8   |  25  |  1.00    Ca    10.2      09 Apr 2023 06:45  Phos  2.1     04-09  Mg     1.90     04-09                Culture - Urine (collected 08 Apr 2023 02:05)  Source: Clean Catch Clean Catch (Midstream)  Preliminary Report (09 Apr 2023 12:14):    >100,000 CFU/ml Escherichia coli    Culture - Blood (collected 07 Apr 2023 21:10)  Source: .Blood Blood-Venous  Gram Stain (08 Apr 2023 13:59):    Growth in anaerobic bottle: Gram Negative Rods    Growth in aerobic bottle: Gram Negative Rods  Preliminary Report (09 Apr 2023 10:25):    Growth in aerobic and anaerobic bottles: Escherichia coli See previous    culture [id number]    Culture - Blood (collected 07 Apr 2023 20:03)  Source: .Blood Blood-Peripheral  Gram Stain (08 Apr 2023 12:44):    Growth in anaerobic bottle: Gram Negative Rods    Growth in aerobic bottle: Gram Negative Rods  Preliminary Report (09 Apr 2023 10:25):    Growth in aerobic and anaerobic bottles: Escherichia coli    ***Blood Panel PCR results on this specimen are available    approximately 3 hours after the Gram stain result.***    Gram stain, PCR, and/or culture results may not always    correspond due to difference in methodologies.    ************************************************************    This PCR assay was performed by multiplex PCR. This    Assay tests for 66 bacterial and resistance gene targets.    Please refer to the [facility] test directory    at [email or web address] for details.  Organism: Blood Culture PCR (08 Apr 2023 13:13)  Organism: Blood Culture PCR (08 Apr 2023 13:13)        RADIOLOGY & ADDITIONAL TESTS:  Results Reviewed:   Imaging Personally Reviewed:  Electrocardiogram Personally Reviewed:    COORDINATION OF CARE:  Care Discussed with Consultants/Other Providers [Y/N]:  Prior or Outpatient Records Reviewed [Y/N]:   PROGRESS NOTE:     Patient is a 82y old  Female who presents with a chief complaint of lethargy (09 Apr 2023 19:33)      SUBJECTIVE / OVERNIGHT EVENTS:    No acute events overnight. Patient examined at bedside with no acute complaints.     Pain: N/A   Bowel Movements: limited   Urination: regular   OOB: limited   PT: dispo home w/ home PT     REVIEW OF SYSTEMS:    CONSTITUTIONAL: No weakness, fevers or chills  EYES/ENT: No visual changes;  No vertigo or throat pain   NECK: No pain or stiffness  RESPIRATORY: No cough, wheezing, hemoptysis; No shortness of breath  CARDIOVASCULAR: No chest pain or palpitations  GASTROINTESTINAL: No abdominal or epigastric pain. No nausea, vomiting, or hematemesis; No diarrhea or constipation. No melena or hematochezia.  GENITOURINARY: No dysuria, frequency or hematuria  NEUROLOGICAL: No numbness or weakness  SKIN: No itching, rashes      MEDICATIONS  (STANDING):  artificial  tears Solution 1 Drop(s) Both EYES three times a day  dorzolamide 2% Ophthalmic Solution 1 Drop(s) Both EYES three times a day  enoxaparin Injectable 40 milliGRAM(s) SubCutaneous every 24 hours  ertapenem  IVPB 1000 milliGRAM(s) IV Intermittent every 24 hours  lactated ringers. 1000 milliLiter(s) (100 mL/Hr) IV Continuous <Continuous>  polyethylene glycol 3350 17 Gram(s) Oral daily    MEDICATIONS  (PRN):  acetaminophen     Tablet .. 650 milliGRAM(s) Oral every 6 hours PRN Temp greater or equal to 38C (100.4F), Mild Pain (1 - 3)  melatonin 3 milliGRAM(s) Oral at bedtime PRN Insomnia      CAPILLARY BLOOD GLUCOSE        I&O's Summary      VITALS:   T(C): 36.5 (04-10-23 @ 05:41), Max: 37.4 (04-09-23 @ 21:55)  HR: 60 (04-10-23 @ 05:41) (60 - 70)  BP: 135/88 (04-10-23 @ 05:41) (135/88 - 146/68)  RR: 18 (04-10-23 @ 05:41) (18 - 18)  SpO2: 99% (04-10-23 @ 05:41) (99% - 99%)    GENERAL: NAD, lying in bed comfortably  HEAD:  Atraumatic, normocephalic  EYES: EOMI, PERRLA, conjunctiva and sclera clear  ENT: Moist mucous membranes  NECK: Supple, no JVD  HEART: Regular rate and rhythm, no murmurs, rubs, or gallops  LUNGS: Unlabored respirations.  Clear to auscultation bilaterally, no crackles, wheezing, or rhonchi  ABDOMEN: Soft, nontender, nondistended, +BS  EXTREMITIES: 2+ peripheral pulses bilaterally. No clubbing, cyanosis, or edema  NERVOUS SYSTEM:  A&Ox3, no focal deficits   SKIN: No rashes or lesions    LABS:                        11.3   6.10  )-----------( 188      ( 10 Apr 2023 06:09 )             37.3     04-09    140  |  105  |  17  ----------------------------<  106<H>  3.8   |  25  |  1.00    Ca    10.2      09 Apr 2023 06:45  Phos  2.1     04-09  Mg     1.90     04-09                Culture - Urine (collected 08 Apr 2023 02:05)  Source: Clean Catch Clean Catch (Midstream)  Preliminary Report (09 Apr 2023 12:14):    >100,000 CFU/ml Escherichia coli    Culture - Blood (collected 07 Apr 2023 21:10)  Source: .Blood Blood-Venous  Gram Stain (08 Apr 2023 13:59):    Growth in anaerobic bottle: Gram Negative Rods    Growth in aerobic bottle: Gram Negative Rods  Preliminary Report (09 Apr 2023 10:25):    Growth in aerobic and anaerobic bottles: Escherichia coli See previous    culture [id number]    Culture - Blood (collected 07 Apr 2023 20:03)  Source: .Blood Blood-Peripheral  Gram Stain (08 Apr 2023 12:44):    Growth in anaerobic bottle: Gram Negative Rods    Growth in aerobic bottle: Gram Negative Rods  Preliminary Report (09 Apr 2023 10:25):    Growth in aerobic and anaerobic bottles: Escherichia coli    ***Blood Panel PCR results on this specimen are available    approximately 3 hours after the Gram stain result.***    Gram stain, PCR, and/or culture results may not always    correspond due to difference in methodologies.    ************************************************************    This PCR assay was performed by multiplex PCR. This    Assay tests for 66 bacterial and resistance gene targets.    Please refer to the [facility] Labs test directory    at [email or web address] for details.  Organism: Blood Culture PCR (08 Apr 2023 13:13)  Organism: Blood Culture PCR (08 Apr 2023 13:13)        RADIOLOGY & ADDITIONAL TESTS: Reviewed

## 2023-04-10 NOTE — PROVIDER CONTACT NOTE (OTHER) - SITUATION
Anaerobic and aerobic E.coli ESBL found in the blood
Growth in aerobic and aerobic E.coli ESBL found in the blood

## 2023-04-10 NOTE — PROGRESS NOTE ADULT - ATTENDING COMMENTS
82F w/ PMH of HTN, dementia (baseline AOx2-3, ambulatory without any assistance) presenting w/ lethargy and decreased PO intake found to be septic with ESBL bacteremia from suspected urinary source. C/w ertapenem for anticipated 7 day course. Appreciate CM input regarding setting up outpt IV abx. Remainder as above.

## 2023-04-10 NOTE — PROVIDER CONTACT NOTE (OTHER) - REASON
Anaerobic and aerobic E.coli ESBL found in the blood
Anaerobic and aerobic E.coli ESBL found in the blood (final blood cx)
No

## 2023-04-10 NOTE — PROVIDER CONTACT NOTE (OTHER) - BACKGROUND
82Y F admitted for UTI, pmh of HTN, dementia, Sepsis
82Y F admitted for UTI, pmh of HTN, dementia, Sepsis

## 2023-04-11 ENCOUNTER — TRANSCRIPTION ENCOUNTER (OUTPATIENT)
Age: 83
End: 2023-04-11

## 2023-04-11 VITALS
DIASTOLIC BLOOD PRESSURE: 67 MMHG | RESPIRATION RATE: 18 BRPM | HEART RATE: 69 BPM | TEMPERATURE: 98 F | SYSTOLIC BLOOD PRESSURE: 142 MMHG | OXYGEN SATURATION: 99 %

## 2023-04-11 DIAGNOSIS — R62.7 ADULT FAILURE TO THRIVE: ICD-10-CM

## 2023-04-11 DIAGNOSIS — E83.52 HYPERCALCEMIA: ICD-10-CM

## 2023-04-11 LAB
-  AMIKACIN: SIGNIFICANT CHANGE UP
-  AMOXICILLIN/CLAVULANIC ACID: SIGNIFICANT CHANGE UP
-  AMPICILLIN/SULBACTAM: SIGNIFICANT CHANGE UP
-  AMPICILLIN: SIGNIFICANT CHANGE UP
-  AZTREONAM: SIGNIFICANT CHANGE UP
-  CEFAZOLIN: SIGNIFICANT CHANGE UP
-  CEFEPIME: SIGNIFICANT CHANGE UP
-  CEFTRIAXONE: SIGNIFICANT CHANGE UP
-  CEFUROXIME: SIGNIFICANT CHANGE UP
-  CIPROFLOXACIN: SIGNIFICANT CHANGE UP
-  ERTAPENEM: SIGNIFICANT CHANGE UP
-  GENTAMICIN: SIGNIFICANT CHANGE UP
-  IMIPENEM: SIGNIFICANT CHANGE UP
-  LEVOFLOXACIN: SIGNIFICANT CHANGE UP
-  MEROPENEM: SIGNIFICANT CHANGE UP
-  NITROFURANTOIN: SIGNIFICANT CHANGE UP
-  PIPERACILLIN/TAZOBACTAM: SIGNIFICANT CHANGE UP
-  TOBRAMYCIN: SIGNIFICANT CHANGE UP
-  TRIMETHOPRIM/SULFAMETHOXAZOLE: SIGNIFICANT CHANGE UP
ANION GAP SERPL CALC-SCNC: 10 MMOL/L — SIGNIFICANT CHANGE UP (ref 7–14)
BUN SERPL-MCNC: 21 MG/DL — SIGNIFICANT CHANGE UP (ref 7–23)
CALCIUM SERPL-MCNC: 11 MG/DL — HIGH (ref 8.4–10.5)
CHLORIDE SERPL-SCNC: 105 MMOL/L — SIGNIFICANT CHANGE UP (ref 98–107)
CO2 SERPL-SCNC: 27 MMOL/L — SIGNIFICANT CHANGE UP (ref 22–31)
CREAT SERPL-MCNC: 1.05 MG/DL — SIGNIFICANT CHANGE UP (ref 0.5–1.3)
CULTURE RESULTS: SIGNIFICANT CHANGE UP
EGFR: 53 ML/MIN/1.73M2 — LOW
GLUCOSE SERPL-MCNC: 96 MG/DL — SIGNIFICANT CHANGE UP (ref 70–99)
HCT VFR BLD CALC: 39 % — SIGNIFICANT CHANGE UP (ref 34.5–45)
HGB BLD-MCNC: 11.7 G/DL — SIGNIFICANT CHANGE UP (ref 11.5–15.5)
MAGNESIUM SERPL-MCNC: 2.3 MG/DL — SIGNIFICANT CHANGE UP (ref 1.6–2.6)
MCHC RBC-ENTMCNC: 27.8 PG — SIGNIFICANT CHANGE UP (ref 27–34)
MCHC RBC-ENTMCNC: 30 GM/DL — LOW (ref 32–36)
MCV RBC AUTO: 92.6 FL — SIGNIFICANT CHANGE UP (ref 80–100)
METHOD TYPE: SIGNIFICANT CHANGE UP
NRBC # BLD: 0 /100 WBCS — SIGNIFICANT CHANGE UP (ref 0–0)
NRBC # FLD: 0 K/UL — SIGNIFICANT CHANGE UP (ref 0–0)
ORGANISM # SPEC MICROSCOPIC CNT: SIGNIFICANT CHANGE UP
ORGANISM # SPEC MICROSCOPIC CNT: SIGNIFICANT CHANGE UP
PHOSPHATE SERPL-MCNC: 2.6 MG/DL — SIGNIFICANT CHANGE UP (ref 2.5–4.5)
PLATELET # BLD AUTO: 208 K/UL — SIGNIFICANT CHANGE UP (ref 150–400)
POTASSIUM SERPL-MCNC: 4.6 MMOL/L — SIGNIFICANT CHANGE UP (ref 3.5–5.3)
POTASSIUM SERPL-SCNC: 4.6 MMOL/L — SIGNIFICANT CHANGE UP (ref 3.5–5.3)
RBC # BLD: 4.21 M/UL — SIGNIFICANT CHANGE UP (ref 3.8–5.2)
RBC # FLD: 13.4 % — SIGNIFICANT CHANGE UP (ref 10.3–14.5)
SODIUM SERPL-SCNC: 142 MMOL/L — SIGNIFICANT CHANGE UP (ref 135–145)
SPECIMEN SOURCE: SIGNIFICANT CHANGE UP
WBC # BLD: 5.63 K/UL — SIGNIFICANT CHANGE UP (ref 3.8–10.5)
WBC # FLD AUTO: 5.63 K/UL — SIGNIFICANT CHANGE UP (ref 3.8–10.5)

## 2023-04-11 PROCEDURE — 99239 HOSP IP/OBS DSCHRG MGMT >30: CPT

## 2023-04-11 RX ADMIN — ERTAPENEM SODIUM 120 MILLIGRAM(S): 1 INJECTION, POWDER, LYOPHILIZED, FOR SOLUTION INTRAMUSCULAR; INTRAVENOUS at 14:12

## 2023-04-11 RX ADMIN — DORZOLAMIDE HYDROCHLORIDE 1 DROP(S): 20 SOLUTION/ DROPS OPHTHALMIC at 05:23

## 2023-04-11 RX ADMIN — DORZOLAMIDE HYDROCHLORIDE 1 DROP(S): 20 SOLUTION/ DROPS OPHTHALMIC at 14:12

## 2023-04-11 RX ADMIN — Medication 1 DROP(S): at 05:23

## 2023-04-11 RX ADMIN — Medication 1 DROP(S): at 14:12

## 2023-04-11 NOTE — PROGRESS NOTE ADULT - PROBLEM SELECTOR PLAN 4
DVT: Lovenox 40mg qd  Diet:: Regular diet  Dispo: Pending clinical course and PT eval On Losartan 25mg Qd  - Will hold iso sepsis, resume on discharge given that potassium levels wnl

## 2023-04-11 NOTE — PROGRESS NOTE ADULT - PROBLEM SELECTOR PLAN 3
On Losartan 25mg Qd  - Will hold iso sepsis Patient baseline AOx2-3. Dependent on ADL's but ambulatory w/o assistance  - Planned outpatient workup with neuro in May.

## 2023-04-11 NOTE — PROVIDER CONTACT NOTE (CRITICAL VALUE NOTIFICATION) - TEST AND RESULT REPORTED:
Urine culture >100,000. ESBO E.coli
+ blood culture from 4/7 growth in anaerobic bottle show gram negative rods in 1st and 2nd bottles.

## 2023-04-11 NOTE — PROGRESS NOTE ADULT - PROBLEM SELECTOR PLAN 2
Patient baseline AOx2-3. Dependent on ADL's but ambulatory w/o assistance  - Planned outpatient workup with neuro in May. Elevated Calcium to 11.0 on 4/11   - PTH, Vitamin D labs sent

## 2023-04-11 NOTE — PROGRESS NOTE ADULT - PROBLEM SELECTOR PLAN 1
Patient found to be febrile to 103.4F w/ leukocytosis WBC=12.1.   - Source of infection: likely urinary   - Infectious w/u: BCx, UCx both positive for ESBL ecoli.  - c/w Ertapenum given ESBL, will plan for 7-10d course depending on blood cultures   - Tylenol PRN for fever  - Continue to monitor fever/WBC curve   - Repeat BCx in AM to monitor for clearance Patient found to be febrile to 103.4F w/ leukocytosis WBC=12.1.   Source of infection: likely urinary   Infectious w/u: BCx, UCx both positive for ESBL ecoli.  - c/w Ertapenum given ESBL, will plan for 7-10d course depending on blood cultures   - Tylenol PRN for fever  - Continue to monitor fever/WBC curve   - Repeat BCx sent yesterday to monitor for clearance. Will f/u Patient found to be febrile to 103.4F w/ leukocytosis WBC=12.1.   Source of infection: likely urinary   Infectious w/u: BCx, UCx both positive for ESBL ecoli.  - c/w Ertapenum given ESBL, will plan for 7-10d course depending on blood cultures   - Tylenol PRN for fever  - Continue to monitor fever/WBC curve   - Repeat BCx sent yesterday to monitor for clearance. Will f/u today for prelim results Patient found to be febrile to 103.4F w/ leukocytosis WBC=12.1.   Source of infection: likely urinary   Infectious w/u: BCx, UCx both positive for ESBL ecoli.  - c/w Ertapenum given ESBL, will plan for 7d course  - Tylenol PRN for fever  - Continue to monitor fever/WBC curve   - Repeat BCx sent yesterday to monitor for clearance. Will f/u today for prelim results

## 2023-04-11 NOTE — DISCHARGE NOTE NURSING/CASE MANAGEMENT/SOCIAL WORK - PATIENT PORTAL LINK FT
You can access the FollowMyHealth Patient Portal offered by [facility] by registering at the following website: [email or web address]. By joining [employer]’s FollowMyHealth portal, you will also be able to view your health information using other applications (apps) compatible with our system.

## 2023-04-11 NOTE — PROGRESS NOTE ADULT - SUBJECTIVE AND OBJECTIVE BOX
PROGRESS NOTE:     Patient is a 82y old  Female who presents with a chief complaint of lethargy (10 Apr 2023 06:54)      SUBJECTIVE / OVERNIGHT EVENTS:    No acute events overnight. Patient examined at bedside with no acute complaints.     Pain:  Bowel Movements:  Urination:  OOB:  PT:    REVIEW OF SYSTEMS:    CONSTITUTIONAL: No weakness, fevers or chills  EYES/ENT: No visual changes;  No vertigo or throat pain   NECK: No pain or stiffness  RESPIRATORY: No cough, wheezing, hemoptysis; No shortness of breath  CARDIOVASCULAR: No chest pain or palpitations  GASTROINTESTINAL: No abdominal or epigastric pain. No nausea, vomiting, or hematemesis; No diarrhea or constipation. No melena or hematochezia.  GENITOURINARY: No dysuria, frequency or hematuria  NEUROLOGICAL: No numbness or weakness  SKIN: No itching, rashes      MEDICATIONS  (STANDING):  artificial  tears Solution 1 Drop(s) Both EYES three times a day  dorzolamide 2% Ophthalmic Solution 1 Drop(s) Both EYES three times a day  enoxaparin Injectable 40 milliGRAM(s) SubCutaneous every 24 hours  ertapenem  IVPB 1000 milliGRAM(s) IV Intermittent every 24 hours  lactated ringers. 1000 milliLiter(s) (100 mL/Hr) IV Continuous <Continuous>  polyethylene glycol 3350 17 Gram(s) Oral daily  sodium phosphate 30 milliMole(s)/500 mL IVPB 30 milliMole(s) IV Intermittent once    MEDICATIONS  (PRN):  acetaminophen     Tablet .. 650 milliGRAM(s) Oral every 6 hours PRN Temp greater or equal to 38C (100.4F), Mild Pain (1 - 3)  melatonin 3 milliGRAM(s) Oral at bedtime PRN Insomnia      CAPILLARY BLOOD GLUCOSE        I&O's Summary      VITALS:   T(C): 37.1 (04-11-23 @ 05:05), Max: 37.1 (04-11-23 @ 05:05)  HR: 66 (04-11-23 @ 05:05) (66 - 67)  BP: 154/66 (04-11-23 @ 05:05) (148/60 - 161/61)  RR: 17 (04-11-23 @ 05:05) (17 - 18)  SpO2: 100% (04-11-23 @ 05:05) (99% - 100%)    GENERAL: NAD, lying in bed comfortably  HEAD:  Atraumatic, normocephalic  EYES: EOMI, PERRLA, conjunctiva and sclera clear  ENT: Moist mucous membranes  NECK: Supple, no JVD  HEART: Regular rate and rhythm, no murmurs, rubs, or gallops  LUNGS: Unlabored respirations.  Clear to auscultation bilaterally, no crackles, wheezing, or rhonchi  ABDOMEN: Soft, nontender, nondistended, +BS  EXTREMITIES: 2+ peripheral pulses bilaterally. No clubbing, cyanosis, or edema  NERVOUS SYSTEM:  A&Ox3, no focal deficits   SKIN: No rashes or lesions    LABS:                        11.3   6.10  )-----------( 188      ( 10 Apr 2023 06:09 )             37.3     04-10    140  |  105  |  21  ----------------------------<  104<H>  4.2   |  27  |  1.06    Ca    10.7<H>      10 Apr 2023 08:05  Phos  2.1     04-10  Mg     2.20     04-10                  RADIOLOGY & ADDITIONAL TESTS:  Results Reviewed:   Imaging Personally Reviewed:  Electrocardiogram Personally Reviewed:    COORDINATION OF CARE:  Care Discussed with Consultants/Other Providers [Y/N]:  Prior or Outpatient Records Reviewed [Y/N]:   PROGRESS NOTE:     Patient is a 82y old  Female who presents with a chief complaint of lethargy (10 Apr 2023 06:54)      SUBJECTIVE / OVERNIGHT EVENTS:    No acute events overnight. Patient examined at bedside with no acute complaints.     Pain: N/A   Bowel Movements: regular   Urination: regular   OOB: as tolerated   PT: home with home PT     REVIEW OF SYSTEMS:    CONSTITUTIONAL: No weakness, fevers or chills  EYES/ENT: No visual changes;  No vertigo or throat pain   NECK: No pain or stiffness  RESPIRATORY: No cough, wheezing, hemoptysis; No shortness of breath  CARDIOVASCULAR: No chest pain or palpitations  GASTROINTESTINAL: No abdominal or epigastric pain. No nausea, vomiting, or hematemesis; No diarrhea or constipation. No melena or hematochezia.  GENITOURINARY: No dysuria, frequency or hematuria  NEUROLOGICAL: No numbness or weakness  SKIN: No itching, rashes      MEDICATIONS  (STANDING):  artificial  tears Solution 1 Drop(s) Both EYES three times a day  dorzolamide 2% Ophthalmic Solution 1 Drop(s) Both EYES three times a day  enoxaparin Injectable 40 milliGRAM(s) SubCutaneous every 24 hours  ertapenem  IVPB 1000 milliGRAM(s) IV Intermittent every 24 hours  lactated ringers. 1000 milliLiter(s) (100 mL/Hr) IV Continuous <Continuous>  polyethylene glycol 3350 17 Gram(s) Oral daily  sodium phosphate 30 milliMole(s)/500 mL IVPB 30 milliMole(s) IV Intermittent once    MEDICATIONS  (PRN):  acetaminophen     Tablet .. 650 milliGRAM(s) Oral every 6 hours PRN Temp greater or equal to 38C (100.4F), Mild Pain (1 - 3)  melatonin 3 milliGRAM(s) Oral at bedtime PRN Insomnia      CAPILLARY BLOOD GLUCOSE        I&O's Summary      VITALS:   T(C): 37.1 (04-11-23 @ 05:05), Max: 37.1 (04-11-23 @ 05:05)  HR: 66 (04-11-23 @ 05:05) (66 - 67)  BP: 154/66 (04-11-23 @ 05:05) (148/60 - 161/61)  RR: 17 (04-11-23 @ 05:05) (17 - 18)  SpO2: 100% (04-11-23 @ 05:05) (99% - 100%)    GENERAL: NAD, lying in bed comfortably  HEAD:  Atraumatic, normocephalic  EYES: EOMI, PERRLA, conjunctiva and sclera clear  ENT: Moist mucous membranes  NECK: Supple, no JVD  HEART: Regular rate and rhythm, no murmurs, rubs, or gallops  LUNGS: Unlabored respirations.  Clear to auscultation bilaterally, no crackles, wheezing, or rhonchi  ABDOMEN: Soft, nontender, nondistended, +BS  EXTREMITIES: 2+ peripheral pulses bilaterally. No clubbing, cyanosis, or edema  NERVOUS SYSTEM:  A&Ox3, no focal deficits   SKIN: No rashes or lesions    LABS:                        11.3   6.10  )-----------( 188      ( 10 Apr 2023 06:09 )             37.3     04-10    140  |  105  |  21  ----------------------------<  104<H>  4.2   |  27  |  1.06    Ca    10.7<H>      10 Apr 2023 08:05  Phos  2.1     04-10  Mg     2.20     04-10                  RADIOLOGY & ADDITIONAL TESTS:  Results Reviewed:   Imaging Personally Reviewed:  Electrocardiogram Personally Reviewed:    COORDINATION OF CARE:  Care Discussed with Consultants/Other Providers [Y/N]:  Prior or Outpatient Records Reviewed [Y/N]:   PROGRESS NOTE:     Patient is a 82y old  Female who presents with a chief complaint of lethargy (10 Apr 2023 06:54)    SUBJECTIVE / OVERNIGHT EVENTS:    No acute events overnight. Patient examined at bedside with no acute complaints.     Pain: N/A   Bowel Movements: regular   Urination: regular   OOB: as tolerated   PT: home with home PT     REVIEW OF SYSTEMS:    CONSTITUTIONAL: No weakness, fevers or chills  EYES/ENT: No visual changes;  No vertigo or throat pain   NECK: No pain or stiffness  RESPIRATORY: No cough, wheezing, hemoptysis; No shortness of breath  CARDIOVASCULAR: No chest pain or palpitations  GASTROINTESTINAL: No abdominal or epigastric pain. No nausea, vomiting, or hematemesis; No diarrhea or constipation. No melena or hematochezia.  GENITOURINARY: No dysuria, frequency or hematuria  NEUROLOGICAL: No numbness or weakness  SKIN: No itching, rashes      MEDICATIONS  (STANDING):  artificial  tears Solution 1 Drop(s) Both EYES three times a day  dorzolamide 2% Ophthalmic Solution 1 Drop(s) Both EYES three times a day  enoxaparin Injectable 40 milliGRAM(s) SubCutaneous every 24 hours  ertapenem  IVPB 1000 milliGRAM(s) IV Intermittent every 24 hours  lactated ringers. 1000 milliLiter(s) (100 mL/Hr) IV Continuous <Continuous>  polyethylene glycol 3350 17 Gram(s) Oral daily  sodium phosphate 30 milliMole(s)/500 mL IVPB 30 milliMole(s) IV Intermittent once    MEDICATIONS  (PRN):  acetaminophen     Tablet .. 650 milliGRAM(s) Oral every 6 hours PRN Temp greater or equal to 38C (100.4F), Mild Pain (1 - 3)  melatonin 3 milliGRAM(s) Oral at bedtime PRN Insomnia      CAPILLARY BLOOD GLUCOSE        I&O's Summary      VITALS:   T(C): 37.1 (04-11-23 @ 05:05), Max: 37.1 (04-11-23 @ 05:05)  HR: 66 (04-11-23 @ 05:05) (66 - 67)  BP: 154/66 (04-11-23 @ 05:05) (148/60 - 161/61)  RR: 17 (04-11-23 @ 05:05) (17 - 18)  SpO2: 100% (04-11-23 @ 05:05) (99% - 100%)    GENERAL: NAD, lying in bed comfortably  HEAD:  Atraumatic, normocephalic  EYES: EOMI, PERRLA, conjunctiva and sclera clear  ENT: Moist mucous membranes  NECK: Supple, no JVD  HEART: Regular rate and rhythm, no murmurs, rubs, or gallops  LUNGS: Unlabored respirations.  Clear to auscultation bilaterally, no crackles, wheezing, or rhonchi  ABDOMEN: Soft, nontender, nondistended, +BS  EXTREMITIES: 2+ peripheral pulses bilaterally. No clubbing, cyanosis, or edema  NERVOUS SYSTEM:  A&Ox3, no focal deficits   SKIN: No rashes or lesions    LABS:                        11.3   6.10  )-----------( 188      ( 10 Apr 2023 06:09 )             37.3     04-10    140  |  105  |  21  ----------------------------<  104<H>  4.2   |  27  |  1.06    Ca    10.7<H>      10 Apr 2023 08:05  Phos  2.1     04-10  Mg     2.20     04-10    RADIOLOGY & ADDITIONAL TESTS: Reviewed

## 2023-04-11 NOTE — PROGRESS NOTE ADULT - ASSESSMENT
82F w/ PMH of HTN, dementia (baseline AOx2-3, ambulatory without any assistance) presenting w/ lethargy and decreased PO intake found to be septic with suspected source of infection to be  vs (less likely) biliary iso recent ERCP   82F w/ PMH of HTN, dementia (baseline AOx2-3, ambulatory without any assistance) presenting w/ lethargy and decreased PO intake found to be septic with suspected source of infection to be  vs (less likely) biliary iso recent ERCP.

## 2023-04-11 NOTE — DISCHARGE NOTE NURSING/CASE MANAGEMENT/SOCIAL WORK - NSDCFUADDAPPT_GEN_ALL_CORE_FT
1) Please follow up with your PCP. If you need a new PCP please Please make an appointment with General Internal Medicine, [address] [place] [address], [address], [place] [address]. Please call [phone] to make an appointment

## 2023-04-11 NOTE — PROVIDER CONTACT NOTE (CRITICAL VALUE NOTIFICATION) - NS PROVIDER READ BACK TO LAB
Now with draining sinus at the medial aspect of his incision.  Feel that this may be due to a suture granuloma.  Drainage is not frankly purulent.  There is no surrounding cellulitis and no mary fluctuance although the tissue feels indurated.  May require operative excision of the medial aspect of the incision.  Want to evaluate with CT scan with IV contrast to rule out deeper fluid collection or concern for mesh infection.   -Will obtain CT scan of the pelvis with IV contrast in the next few days    -Follow-up Tuesday afternoon after CT scan completed  
yes
yes

## 2023-04-11 NOTE — PROGRESS NOTE ADULT - ATTENDING COMMENTS
82F w/ PMH of HTN, dementia (baseline AOx2-3, ambulatory without any assistance) presenting w/ lethargy and decreased PO intake found to be septic with ESBL bacteremia from suspected urinary source. C/w ertapenem for 7 day course. Appreciate CM input regarding setting up outpt IV abx. DC home today, dc planning 36 minutes. Remainder as above.

## 2023-04-15 LAB
CULTURE RESULTS: SIGNIFICANT CHANGE UP
SPECIMEN SOURCE: SIGNIFICANT CHANGE UP

## 2023-04-25 ENCOUNTER — APPOINTMENT (OUTPATIENT)
Dept: INTERNAL MEDICINE | Facility: CLINIC | Age: 83
End: 2023-04-25
Payer: MEDICARE

## 2023-04-25 VITALS
DIASTOLIC BLOOD PRESSURE: 70 MMHG | SYSTOLIC BLOOD PRESSURE: 134 MMHG | WEIGHT: 158 LBS | HEART RATE: 69 BPM | TEMPERATURE: 97.1 F | OXYGEN SATURATION: 95 % | HEIGHT: 63 IN | BODY MASS INDEX: 28 KG/M2

## 2023-04-25 DIAGNOSIS — B35.1 TINEA UNGUIUM: ICD-10-CM

## 2023-04-25 DIAGNOSIS — Z82.49 FAMILY HISTORY OF ISCHEMIC HEART DISEASE AND OTHER DISEASES OF THE CIRCULATORY SYSTEM: ICD-10-CM

## 2023-04-25 DIAGNOSIS — E83.52 HYPERCALCEMIA: ICD-10-CM

## 2023-04-25 PROCEDURE — G0439: CPT

## 2023-04-25 NOTE — ASSESSMENT
[FreeTextEntry1] : HCM:\par - a1c, CMP\par - COVID Pfizer 3/12/21, 4/2/21, moderna 12/30/21, pfizer bivalent 1/15/23\par - Pneumonia vaccine reported as 2 received in [place]\par - Shingrix discussed and recommended\par \par RTC pending subspecialty care, anticipate 6 months

## 2023-04-25 NOTE — PHYSICAL EXAM
[No Acute Distress] : no acute distress [PERRL] : pupils equal round and reactive to light [EOMI] : extraocular movements intact [Normal Outer Ear/Nose] : the outer ears and nose were normal in appearance [Normal Oropharynx] : the oropharynx was normal [Normal TMs] : both tympanic membranes were normal [No Respiratory Distress] : no respiratory distress  [No Accessory Muscle Use] : no accessory muscle use [Clear to Auscultation] : lungs were clear to auscultation bilaterally [Normal Rate] : normal rate  [Regular Rhythm] : with a regular rhythm [Normal S1, S2] : normal S1 and S2 [No Murmur] : no murmur heard [No Edema] : there was no peripheral edema [Soft] : abdomen soft [Non Tender] : non-tender [Non-distended] : non-distended [Grossly Normal Strength/Tone] : grossly normal strength/tone [No Rash] : no rash [de-identified] : Min renner [de-identified] : Hard of hearing. Answers questions inappropriately at times. Slight tremor noted with extended arms and gripping. 4+/5 right hand, 4/5 right hand. 4-/5 legs hip flexion bilaterally. [de-identified] : Tearful at times

## 2023-04-25 NOTE — HEALTH RISK ASSESSMENT
[No] : In the past 12 months have you used drugs other than those required for medical reasons? No [No falls in past year] : Patient reported no falls in the past year [With Family] : lives with family [Retired] : retired [Independent] : feeding [Some assistance needed] : using telephone [Full assistance needed] : managing finances [Reports changes in hearing] : Reports changes in hearing [Reports changes in vision] : Reports changes in vision [Smoke Detector] : smoke detector [Carbon Monoxide Detector] : carbon monoxide detector [Seat Belt] :  uses seat belt [Medical reason not done] : Medical reason not done [Never] : Never [de-identified] : Dementia [Reports changes in dental health] : Reports no changes in dental health

## 2023-04-25 NOTE — HISTORY OF PRESENT ILLNESS
[de-identified] : Allyn Interiano is a 83yo F with HTN, HLD, dementia, pancreatic duct stenosis s/p ERCP who presents to establish care/ follow-up after hospital discharge. Accompanied by daughter Rabia\par \par Admitted 4/8-4/11 for lethargy, found to have ESBL E. coli bacteremia 2/2 UTI. Discharged on ertapenem. Losartan was held due to hypotension and FORD. Since discharge, daughter reports patient is doing well. Patient reports she moved here from [place] about 1 year ago. Has a few requests:\par - POdaitry referral for thickened toenails\par - Dementia specialist. Previously was on donepezil but this was held in the setting of bradycardia in the hospital. Daughter notes patient refused to wear hearing aids due to discomfort\par - Handicap placard\par - Continued PT\par \par Surgery:\par Appendix\par Hysterectomy\par \par CAD: Mom\par \par

## 2023-04-26 ENCOUNTER — LABORATORY RESULT (OUTPATIENT)
Age: 83
End: 2023-04-26

## 2023-04-28 NOTE — ASU PATIENT PROFILE, ADULT - PATIENT REPRESENTATIVE NAME
"  Subjective:     Problem List:  Heart murmur    HPI:   Harley Cohen is a 56 y.o. male referred by [facility] for evaluation of Heart Murmur.    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 He   He states he has had the heart murmur for over 20 years.  He recalls being told that he had a valve problem but that he did not require surgery He moved from [place] to [place] 2 years ago.  He does not report chest discomfort or shortness of breath.  He does not report orthopnea, PND or edema.  He does not report decrease in exercise tolerance.   He does not recall having a heart murmur during childhood.  He does not have a family history of of congenital heart disease.      Review of Systems   Constitutional: Negative for malaise/fatigue, weight gain and weight loss.   Cardiovascular:  Negative for chest pain, dyspnea on exertion, leg swelling and palpitations.   Respiratory:  Negative for cough and hemoptysis.    Hematologic/Lymphatic: Does not bruise/bleed easily.   Musculoskeletal:  Negative for arthritis and muscle cramps.   Gastrointestinal:  Negative for abdominal pain, heartburn and melena.   Genitourinary:  Negative for hematuria and nocturia.   Neurological:  Negative for headaches, light-headedness and seizures.   Psychiatric/Behavioral:  Negative for depression.      Review of patient's allergies indicates:  No Known Allergies     No current outpatient medications on file.     No current facility-administered medications for this visit.         Past Medical and Surgical history:  Harley Cohen  has no past medical history on file.   No past surgical history on file.      Social history:  He moved from [place] to [place] 2 years ago.    Family history:  Father had cancer.  There is no history of premature coronary disease        Objective:     Physical Exam  Constitutional:       Appearance: He is well-developed.      Comments: BP (!) 148/72   Pulse 87   Ht 5' 6" (1.676 m)   Wt 86 " kg (189 lb 7.8 oz)   BMI 30.58 kg/m²      HENT:      Head: Normocephalic and atraumatic.   Neck:      Vascular: No carotid bruit or JVD.   Cardiovascular:      Rate and Rhythm: Normal rate and regular rhythm.      Pulses:           Radial pulses are 2+ on the right side and 2+ on the left side.        Posterior tibial pulses are 2+ on the right side and 2+ on the left side.      Heart sounds: S1 normal and S2 normal. Murmur heard.   Harsh midsystolic murmur is present with a grade of 3/6 at the lower left sternal border radiating to the back.     No gallop.   Pulmonary:      Effort: Pulmonary effort is normal.      Breath sounds: No wheezing or rales.   Chest:      Chest wall: There is no dullness to percussion.   Abdominal:      Palpations: Abdomen is soft. There is no hepatomegaly or splenomegaly.      Tenderness: There is no abdominal tenderness.   Musculoskeletal:      Right lower leg: No edema.      Left lower leg: No edema.   Skin:     General: Skin is warm and dry.      Findings: No bruising.      Nails: There is no clubbing.   Neurological:      Mental Status: He is alert and oriented to person, place, and time.      Gait: Gait normal.   Psychiatric:         Speech: Speech normal.         Behavior: Behavior normal.         Thought Content: Thought content normal.         Judgment: Judgment normal.         ECG today sinus rhythm without LVH, ST-T abnormality.      Focused echo performed using a hand-held device revealed normal biventricular size and function.  The tricuspid mitral valves appear normal except for a bit of mitral annular calcification.  I could not visualize the aortic or pulmonic valves      Assessment and Plan:       ICD-10-CM ICD-9-CM   1. Systolic murmur  R01.1 785.2   2. Blood pressure elevated without history of HTN  R03.0 796.2        I suspect that he has bicuspid aortic valve, VSD or patent ductus arteriosus.  Will obtain a formal echocardiogram.  See no reason to delay his CDL  renewal.     Orders entered during this encounter:    Systolic murmur  -     IN OFFICE EKG 12-LEAD (to Muse)  -     Echo; Future; Expected date: 04/28/2023    Blood pressure elevated without history of HTN         Follow up if symptoms worsen or fail to improve.               Rabia

## 2023-05-02 DIAGNOSIS — E21.3 HYPERPARATHYROIDISM, UNSPECIFIED: ICD-10-CM

## 2023-05-02 LAB
25(OH)D3 SERPL-MCNC: 35.3 NG/ML
ALBUMIN MFR SERPL ELPH: 49.7 %
ALBUMIN SERPL ELPH-MCNC: 3.9 G/DL
ALBUMIN SERPL-MCNC: 3.6 G/DL
ALBUMIN/GLOB SERPL: 1 RATIO
ALP BLD-CCNC: 86 U/L
ALPHA1 GLOB MFR SERPL ELPH: 4.9 %
ALPHA1 GLOB SERPL ELPH-MCNC: 0.4 G/DL
ALPHA2 GLOB MFR SERPL ELPH: 10.4 %
ALPHA2 GLOB SERPL ELPH-MCNC: 0.8 G/DL
ALT SERPL-CCNC: 16 U/L
ANION GAP SERPL CALC-SCNC: 9 MMOL/L
AST SERPL-CCNC: 24 U/L
B-GLOBULIN MFR SERPL ELPH: 12.3 %
B-GLOBULIN SERPL ELPH-MCNC: 0.9 G/DL
BILIRUB SERPL-MCNC: 0.3 MG/DL
BUN SERPL-MCNC: 17 MG/DL
CALCIUM SERPL-MCNC: 10.7 MG/DL
CALCIUM SERPL-MCNC: 10.7 MG/DL
CHLORIDE SERPL-SCNC: 105 MMOL/L
CO2 SERPL-SCNC: 27 MMOL/L
CREAT SERPL-MCNC: 0.98 MG/DL
EGFR: 57 ML/MIN/1.73M2
ESTIMATED AVERAGE GLUCOSE: 120 MG/DL
GAMMA GLOB FLD ELPH-MCNC: 1.7 G/DL
GAMMA GLOB MFR SERPL ELPH: 22.7 %
GLUCOSE SERPL-MCNC: 94 MG/DL
HBA1C MFR BLD HPLC: 5.8 %
INTERPRETATION SERPL IEP-IMP: NORMAL
PARATHYROID HORMONE INTACT: 106 PG/ML
PHOSPHATE SERPL-MCNC: 2 MG/DL
POTASSIUM SERPL-SCNC: 4.1 MMOL/L
PROT SERPL-MCNC: 7.3 G/DL
SODIUM SERPL-SCNC: 142 MMOL/L
TSH SERPL-ACNC: 1.71 UIU/ML
VIT B12 SERPL-MCNC: 782 PG/ML

## 2023-05-08 ENCOUNTER — NON-APPOINTMENT (OUTPATIENT)
Age: 83
End: 2023-05-08

## 2023-05-11 ENCOUNTER — TRANSCRIPTION ENCOUNTER (OUTPATIENT)
Age: 83
End: 2023-05-11

## 2023-05-12 ENCOUNTER — INPATIENT (INPATIENT)
Facility: HOSPITAL | Age: 83
LOS: 0 days | Discharge: HOME | End: 2023-05-13
Attending: STUDENT IN AN ORGANIZED HEALTH CARE EDUCATION/TRAINING PROGRAM | Admitting: STUDENT IN AN ORGANIZED HEALTH CARE EDUCATION/TRAINING PROGRAM
Payer: MEDICARE

## 2023-05-12 ENCOUNTER — RESULT REVIEW (OUTPATIENT)
Age: 83
End: 2023-05-12

## 2023-05-12 VITALS
HEART RATE: 86 BPM | RESPIRATION RATE: 16 BRPM | DIASTOLIC BLOOD PRESSURE: 92 MMHG | SYSTOLIC BLOOD PRESSURE: 137 MMHG | HEIGHT: 64 IN | TEMPERATURE: 98 F

## 2023-05-12 DIAGNOSIS — Z98.890 OTHER SPECIFIED POSTPROCEDURAL STATES: Chronic | ICD-10-CM

## 2023-05-12 DIAGNOSIS — K35.80 UNSPECIFIED ACUTE APPENDICITIS: ICD-10-CM

## 2023-05-12 DIAGNOSIS — Z90.5 ACQUIRED ABSENCE OF KIDNEY: Chronic | ICD-10-CM

## 2023-05-12 LAB
ALBUMIN SERPL ELPH-MCNC: 3.7 G/DL — SIGNIFICANT CHANGE UP (ref 3.3–5)
ALP SERPL-CCNC: 100 U/L — SIGNIFICANT CHANGE UP (ref 40–120)
ALT FLD-CCNC: 9 U/L — SIGNIFICANT CHANGE UP (ref 4–33)
ANION GAP SERPL CALC-SCNC: 15 MMOL/L — HIGH (ref 7–14)
APPEARANCE UR: ABNORMAL
APTT BLD: 27.2 SEC — SIGNIFICANT CHANGE UP (ref 27–36.3)
AST SERPL-CCNC: 23 U/L — SIGNIFICANT CHANGE UP (ref 4–32)
BACTERIA # UR AUTO: ABNORMAL
BASOPHILS # BLD AUTO: 0.02 K/UL — SIGNIFICANT CHANGE UP (ref 0–0.2)
BASOPHILS NFR BLD AUTO: 0.1 % — SIGNIFICANT CHANGE UP (ref 0–2)
BILIRUB SERPL-MCNC: 0.7 MG/DL — SIGNIFICANT CHANGE UP (ref 0.2–1.2)
BILIRUB UR-MCNC: NEGATIVE — SIGNIFICANT CHANGE UP
BLD GP AB SCN SERPL QL: NEGATIVE — SIGNIFICANT CHANGE UP
BUN SERPL-MCNC: 34 MG/DL — HIGH (ref 7–23)
CALCIUM SERPL-MCNC: 10.6 MG/DL — HIGH (ref 8.4–10.5)
CHLORIDE SERPL-SCNC: 97 MMOL/L — LOW (ref 98–107)
CO2 SERPL-SCNC: 25 MMOL/L — SIGNIFICANT CHANGE UP (ref 22–31)
COLOR SPEC: YELLOW — SIGNIFICANT CHANGE UP
CREAT SERPL-MCNC: 1.86 MG/DL — HIGH (ref 0.5–1.3)
DIFF PNL FLD: ABNORMAL
EGFR: 27 ML/MIN/1.73M2 — LOW
EOSINOPHIL # BLD AUTO: 0 K/UL — SIGNIFICANT CHANGE UP (ref 0–0.5)
EOSINOPHIL NFR BLD AUTO: 0 % — SIGNIFICANT CHANGE UP (ref 0–6)
EPI CELLS # UR: 4 /HPF — SIGNIFICANT CHANGE UP (ref 0–5)
GLUCOSE SERPL-MCNC: 121 MG/DL — HIGH (ref 70–99)
GLUCOSE UR QL: NEGATIVE — SIGNIFICANT CHANGE UP
HCT VFR BLD CALC: 43.5 % — SIGNIFICANT CHANGE UP (ref 34.5–45)
HGB BLD-MCNC: 14.1 G/DL — SIGNIFICANT CHANGE UP (ref 11.5–15.5)
HYALINE CASTS # UR AUTO: 25 /LPF — HIGH (ref 0–7)
IANC: 11.55 K/UL — HIGH (ref 1.8–7.4)
IMM GRANULOCYTES NFR BLD AUTO: 0.4 % — SIGNIFICANT CHANGE UP (ref 0–0.9)
INR BLD: 1.15 RATIO — SIGNIFICANT CHANGE UP (ref 0.88–1.16)
KETONES UR-MCNC: NEGATIVE — SIGNIFICANT CHANGE UP
LEUKOCYTE ESTERASE UR-ACNC: NEGATIVE — SIGNIFICANT CHANGE UP
LIDOCAIN IGE QN: 22 U/L — SIGNIFICANT CHANGE UP (ref 7–60)
LYMPHOCYTES # BLD AUTO: 1.61 K/UL — SIGNIFICANT CHANGE UP (ref 1–3.3)
LYMPHOCYTES # BLD AUTO: 11.4 % — LOW (ref 13–44)
MCHC RBC-ENTMCNC: 27.8 PG — SIGNIFICANT CHANGE UP (ref 27–34)
MCHC RBC-ENTMCNC: 32.4 GM/DL — SIGNIFICANT CHANGE UP (ref 32–36)
MCV RBC AUTO: 85.6 FL — SIGNIFICANT CHANGE UP (ref 80–100)
MONOCYTES # BLD AUTO: 0.91 K/UL — HIGH (ref 0–0.9)
MONOCYTES NFR BLD AUTO: 6.4 % — SIGNIFICANT CHANGE UP (ref 2–14)
NEUTROPHILS # BLD AUTO: 11.55 K/UL — HIGH (ref 1.8–7.4)
NEUTROPHILS NFR BLD AUTO: 81.7 % — HIGH (ref 43–77)
NITRITE UR-MCNC: NEGATIVE — SIGNIFICANT CHANGE UP
NRBC # BLD: 0 /100 WBCS — SIGNIFICANT CHANGE UP (ref 0–0)
NRBC # FLD: 0 K/UL — SIGNIFICANT CHANGE UP (ref 0–0)
PH UR: 6 — SIGNIFICANT CHANGE UP (ref 5–8)
PLATELET # BLD AUTO: 255 K/UL — SIGNIFICANT CHANGE UP (ref 150–400)
POTASSIUM SERPL-MCNC: 4.4 MMOL/L — SIGNIFICANT CHANGE UP (ref 3.5–5.3)
POTASSIUM SERPL-SCNC: 4.4 MMOL/L — SIGNIFICANT CHANGE UP (ref 3.5–5.3)
PROT SERPL-MCNC: 8.1 G/DL — SIGNIFICANT CHANGE UP (ref 6–8.3)
PROT UR-MCNC: ABNORMAL
PROTHROM AB SERPL-ACNC: 13.4 SEC — SIGNIFICANT CHANGE UP (ref 10.5–13.4)
RBC # BLD: 5.08 M/UL — SIGNIFICANT CHANGE UP (ref 3.8–5.2)
RBC # FLD: 13.7 % — SIGNIFICANT CHANGE UP (ref 10.3–14.5)
RBC CASTS # UR COMP ASSIST: 7 /HPF — HIGH (ref 0–4)
RH IG SCN BLD-IMP: POSITIVE — SIGNIFICANT CHANGE UP
SODIUM SERPL-SCNC: 137 MMOL/L — SIGNIFICANT CHANGE UP (ref 135–145)
SP GR SPEC: 1.02 — SIGNIFICANT CHANGE UP (ref 1.01–1.05)
UROBILINOGEN FLD QL: SIGNIFICANT CHANGE UP
WBC # BLD: 14.14 K/UL — HIGH (ref 3.8–10.5)
WBC # FLD AUTO: 14.14 K/UL — HIGH (ref 3.8–10.5)
WBC UR QL: 6 /HPF — HIGH (ref 0–5)

## 2023-05-12 PROCEDURE — 99222 1ST HOSP IP/OBS MODERATE 55: CPT | Mod: 57,GC

## 2023-05-12 PROCEDURE — 44970 LAPAROSCOPY APPENDECTOMY: CPT | Mod: GC

## 2023-05-12 PROCEDURE — 88304 TISSUE EXAM BY PATHOLOGIST: CPT | Mod: 26

## 2023-05-12 PROCEDURE — 74176 CT ABD & PELVIS W/O CONTRAST: CPT | Mod: 26,MA

## 2023-05-12 PROCEDURE — 71045 X-RAY EXAM CHEST 1 VIEW: CPT | Mod: 26

## 2023-05-12 PROCEDURE — 99285 EMERGENCY DEPT VISIT HI MDM: CPT | Mod: FS

## 2023-05-12 DEVICE — STAPLER COVIDIEN TRI-STAPLE 45MM TAN RELOAD: Type: IMPLANTABLE DEVICE | Status: FUNCTIONAL

## 2023-05-12 RX ORDER — CYCLOSPORINE 0.5 MG/ML
1 EMULSION OPHTHALMIC
Qty: 0 | Refills: 0 | DISCHARGE

## 2023-05-12 RX ORDER — HEPARIN SODIUM 5000 [USP'U]/ML
5000 INJECTION INTRAVENOUS; SUBCUTANEOUS EVERY 8 HOURS
Refills: 0 | Status: DISCONTINUED | OUTPATIENT
Start: 2023-05-12 | End: 2023-05-13

## 2023-05-12 RX ORDER — SODIUM CHLORIDE 9 MG/ML
1000 INJECTION, SOLUTION INTRAVENOUS
Refills: 0 | Status: DISCONTINUED | OUTPATIENT
Start: 2023-05-12 | End: 2023-05-13

## 2023-05-12 RX ORDER — ROSUVASTATIN CALCIUM 5 MG/1
1 TABLET ORAL
Qty: 0 | Refills: 0 | DISCHARGE

## 2023-05-12 RX ORDER — PIPERACILLIN AND TAZOBACTAM 4; .5 G/20ML; G/20ML
3.38 INJECTION, POWDER, LYOPHILIZED, FOR SOLUTION INTRAVENOUS EVERY 12 HOURS
Refills: 0 | Status: DISCONTINUED | OUTPATIENT
Start: 2023-05-12 | End: 2023-05-13

## 2023-05-12 RX ORDER — DORZOLAMIDE HYDROCHLORIDE 20 MG/ML
1 SOLUTION/ DROPS OPHTHALMIC
Qty: 0 | Refills: 0 | DISCHARGE

## 2023-05-12 RX ORDER — OXYCODONE HYDROCHLORIDE 5 MG/1
5 TABLET ORAL ONCE
Refills: 0 | Status: DISCONTINUED | OUTPATIENT
Start: 2023-05-12 | End: 2023-05-13

## 2023-05-12 RX ORDER — HYDROMORPHONE HYDROCHLORIDE 2 MG/ML
0.5 INJECTION INTRAMUSCULAR; INTRAVENOUS; SUBCUTANEOUS
Refills: 0 | Status: DISCONTINUED | OUTPATIENT
Start: 2023-05-12 | End: 2023-05-13

## 2023-05-12 RX ORDER — BRIMONIDINE TARTRATE, TIMOLOL MALEATE 2; 5 MG/ML; MG/ML
1 SOLUTION/ DROPS OPHTHALMIC
Qty: 0 | Refills: 0 | DISCHARGE

## 2023-05-12 RX ORDER — ONDANSETRON 8 MG/1
4 TABLET, FILM COATED ORAL ONCE
Refills: 0 | Status: DISCONTINUED | OUTPATIENT
Start: 2023-05-12 | End: 2023-05-13

## 2023-05-12 RX ORDER — PIPERACILLIN AND TAZOBACTAM 4; .5 G/20ML; G/20ML
3.38 INJECTION, POWDER, LYOPHILIZED, FOR SOLUTION INTRAVENOUS ONCE
Refills: 0 | Status: COMPLETED | OUTPATIENT
Start: 2023-05-12 | End: 2023-05-12

## 2023-05-12 RX ORDER — DORZOLAMIDE HYDROCHLORIDE 20 MG/ML
1 SOLUTION/ DROPS OPHTHALMIC
Refills: 0 | Status: DISCONTINUED | OUTPATIENT
Start: 2023-05-12 | End: 2023-05-13

## 2023-05-12 RX ORDER — SODIUM CHLORIDE 9 MG/ML
1000 INJECTION INTRAMUSCULAR; INTRAVENOUS; SUBCUTANEOUS ONCE
Refills: 0 | Status: COMPLETED | OUTPATIENT
Start: 2023-05-12 | End: 2023-05-12

## 2023-05-12 RX ADMIN — SODIUM CHLORIDE 1000 MILLILITER(S): 9 INJECTION INTRAMUSCULAR; INTRAVENOUS; SUBCUTANEOUS at 13:28

## 2023-05-12 RX ADMIN — PIPERACILLIN AND TAZOBACTAM 200 GRAM(S): 4; .5 INJECTION, POWDER, LYOPHILIZED, FOR SOLUTION INTRAVENOUS at 19:32

## 2023-05-12 NOTE — ED PROVIDER NOTE - CLINICAL SUMMARY MEDICAL DECISION MAKING FREE TEXT BOX
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 Daughter states also agitated this am which is out of character for her.  Denies fever, chills, headache, chest pain, shortness of breath,  n/v/d, dysuria, weakness, numbness, tingling. Last BM yesterday. Well appearing. Accompanied with daughter   PMD- Dr. Yamila Neri  Plan: Will obtain basic labs, UA, UCX, CT ab/pelvis, give fluids, ABX?, reassess

## 2023-05-12 NOTE — ED ADULT TRIAGE NOTE - CHIEF COMPLAINT QUOTE
pt brought in by EMS, called in by daughter for worsening AMS, failure to thrive. PMHx: dementia, HTN, UTI's; right nephrectomy. Pt not taking any dementia meds at this time due to side efffects. daughter accompanying patient, denies sick contacts or recent fevers. PT AOx1-2 (name and place). BG in field [phone].

## 2023-05-12 NOTE — H&P ADULT - NSHPPHYSICALEXAM_GEN_ALL_CORE
Vital Signs Last 24 Hrs  T(C): 36.7 (12 May 2023 21:43), Max: 36.8 (12 May 2023 12:11)  T(F): 98 (12 May 2023 21:43), Max: 98.3 (12 May 2023 12:11)  HR: 71 (12 May 2023 21:43) (71 - 86)  BP: 121/58 (12 May 2023 21:43) (121/58 - 137/92)  BP(mean): --  RR: 18 (12 May 2023 21:43) (16 - 18)  SpO2: 98% (12 May 2023 21:43) (98% - 100%)    Parameters below as of 12 May 2023 21:43  Patient On (Oxygen Delivery Method): room air    General: well developed, well nourished, NAD  Neuro: alert and oriented, no focal deficits, moves all extremities spontaneously  HEENT: NCAT, EOMI, anicteric, mucosa moist  Respiratory: airway patent  CVS: regular rate  Abdomen: soft, tender in RLQ, no rebound or guarding  Extremities: no edema, sensation and movement grossly intact  Skin: warm, dry, appropriate color

## 2023-05-12 NOTE — ED ADULT NURSE NOTE - CCCP TRG CHIEF CMPLNT
PA needs to be initiated by patient, he is aware.  Smoking cessations medications are taken care in house through [employer].  (Routing comment)    failure to thrive

## 2023-05-12 NOTE — ED PROVIDER NOTE - ATTENDING APP SHARED VISIT CONTRIBUTION OF CARE
DR. QUINONEZ, ATTENDING MD-  I personally saw the patient with the PA and performed a substantive portion of the visit including all aspects of the medical decision making.    81 y/o female h/o dementia htn uti bibems for worsening confusion and refusal to eat x2 days.  Pt is sleepy but easily rousable, abd soft ttp rlq, dry mm.  Evaluate for lyte abn uti intra-abd infection ftt.  Obtain cbc cmp ct a/p ua ucx give ivf bolus will need admission for further care and evaluation.

## 2023-05-12 NOTE — H&P ADULT - ASSESSMENT
87F pmh nephrectomy, dementia, hysterectomy with appendicitis    Plan:  - Admit to Dr. Carrillo  - IV abx  - Added on and consented for appendectomy  - [employer] refusing blood transfusions  - DVT ppx    Plan discussed with Dr. Gerardo Terry [facility]  General Surgery  B Team  [phone]

## 2023-05-12 NOTE — ED ADULT NURSE NOTE - NSFALLLASTSIX_ED_ALL_ED
CC:  Azucena Thayer is here today for Well Child (15 month old)     Medications: currently is not taking any medications  Refills needed today?  NO  denies Latex allergy or sensitivity  Patient would like communication of their results via:      Cell Phone:   Telephone Information:   Mobile [phone]     Okay to leave a message containing results? Yes  Tobacco history: verified  Patient's current [facility] status: Inactive - Information given.    Health Maintenance   Topic Date Due   • Hepatitis A Vaccine (1 of 2 - 2-dose series) 04/18/2018   • DTaP/Tdap/Td Vaccine (4 - DTaP) 07/18/2018   • Influenza Vaccine (1) 09/01/2018   • IPV Vaccine (4 of 4 - All-IPV series) 04/18/2021   • MMR Vaccine (2 of 2 - Standard series) 04/18/2021   • Varicella Vaccine (2 of 2 - 2-dose childhood series) 04/18/2021   • Meningococcal Vaccine (1 of 2 - 2-dose series) 04/18/2028   • Pneumococcal Vaccine  Completed   • Hepatitis B Vaccine  Completed   • HIB Vaccine  Completed   • Rotavirus Vaccine  Completed              No.

## 2023-05-12 NOTE — H&P ADULT - NSHPLABSRESULTS_GEN_ALL_CORE
----------  LABORATORY DATA:  ----------                        14.1   14.14 )-----------( 255      ( 12 May 2023 13:30 )             43.5                   137  |  97<L>  |  34<H>  ----------------------------<  121<H>  4.4   |  25  |  1.86<H>    Ca    10.6<H>      12 May 2023 13:30    TPro  8.1  /  Alb  3.7  /  TBili  0.7  /  DBili  x   /  AST  23  /  ALT  9   /  AlkPhos  100      LIVER FUNCTIONS - ( 12 May 2023 13:30 )    < from: CT Abdomen and Pelvis No Cont (23 @ 17:31) >      FINDINGS:  LOWER CHEST: Within normal limits.    LIVER: Within normal limits.  BILE DUCTS: Mildly dilated, decreased from 2023 with resolution of   previously described pneumobilia.  GALLBLADDER: Contracted  SPLEEN: Within normal limits.  PANCREAS: Within normal limits.  ADRENALS: Within normal limits.  KIDNEYS/URETERS: Absent right kidney. No left hydronephrosis. Stable left   renal cysts.    BLADDER: Within normal limits.  REPRODUCTIVE ORGANS: Hysterectomy.    BOWEL: No bowel obstruction. Appendix is dilated up to 1.5 cm at the tip   with adjacent fatty stranding. No free air or adjacent collection is   identified. Colonic diverticulosis without evidence of acute   diverticulitis.  PERITONEUM: No ascites.  VESSELS: Atherosclerotic changes.  RETROPERITONEUM/LYMPH NODES: No lymphadenopathy.  ABDOMINAL WALL: Small fat-containing umbilical hernia.  BONES: Degenerative changes. Stable right iliac bone sclerosis and   heterogeneous sclerosis of the visualized thoracolumbar spine spine.    IMPRESSION:  Acute uncomplicated appendicitis.    < end of copied text >      Alb: 3.7 g/dL / Pro: 8.1 g/dL / ALK PHOS: 100 U/L / ALT: 9 U/L / AST: 23 U/L / GGT: x           PT/INR - ( 12 May 2023 20:33 )   PT: 13.4 sec;   INR: 1.15 ratio         PTT - ( 12 May 2023 20:33 )  PTT:27.2 sec  Urinalysis Basic - ( 12 May 2023 17:07 )    Color: Yellow / Appearance: Slightly Turbid / S.022 / pH: x  Gluc: x / Ketone: Negative  / Bili: Negative / Urobili: <2 mg/dL   Blood: x / Protein: >600 mg/dL / Nitrite: Negative   Leuk Esterase: Negative / RBC: 7 /HPF / WBC 6 /HPF   Sq Epi: x / Non Sq Epi: x / Bacteria: Moderate                ----------  RADIOGRAPHIC DATA:  ---------  PACS Image: Image(s) Available (23 @ 17:31)  PACS Image: Image(s) Available (23 @ 16:36)

## 2023-05-12 NOTE — ED PROVIDER NOTE - OBJECTIVE STATEMENT
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 Daughter states also agitated this am which is out of character for her.  Denies fever, chills, headache, chest pain, shortness of breath,  n/v/d, dysuria, weakness, numbness, tingling. Last BM yesterday. Well appearing. Accompanied with daughter   PMD- Dr. Yamila Neri

## 2023-05-12 NOTE — ED ADULT NURSE NOTE - OBJECTIVE STATEMENT
A&Ox1. ambulatory w/ assistance. brought in by EMS for increasing dementia and agitation. NAD. pt does not answer RN assessment questions. daughter at bedside and states PT was combative with her this morning and has not eaten since lunch yesterday. respirations are even and un labored. skin intact. 20g placed to LAC. labs drawn and sent call bell at bedside. safety precautions maintained.

## 2023-05-12 NOTE — ED ADULT NURSE NOTE - NSFALLHARMRISKINTERV_ED_ALL_ED
Assistance OOB with selected safe patient handling equipment if applicable/Assistance with ambulation/Communicate risk of Fall with Harm to all staff, patient, and family/Monitor gait and stability/Monitor for mental status changes and reorient to person, place, and time, as needed/Move patient closer to nursing station/within visual sight of ED staff/Provide visual cue: red socks, yellow wristband, yellow gown, etc/Reinforce activity limits and safety measures with patient and family/Toileting schedule using arm’s reach rule for commode and bathroom/Use of alarms - bed, stretcher, chair and/or video monitoring/Bed in lowest position, wheels locked, appropriate side rails in place/Call bell, personal items and telephone in reach/Instruct patient to call for assistance before getting out of bed/chair/stretcher/Non-slip footwear applied when patient is off stretcher/[place] to call system/Physically safe environment - no spills, clutter or unnecessary equipment/Purposeful Proactive Rounding/Room/bathroom lighting operational, light cord in reach

## 2023-05-12 NOTE — H&P ADULT - HISTORY OF PRESENT ILLNESS
82F pmh dementia, nephrectomy, hysterectomy, HTN presenting with lethargy and weakness for past two days,    Daughter reports patient had lethargy and was not acting herself for the past two days, poor po intake without any food or drink since 5/11 lunch time. Denies f/n, did have chills, denies chest pain, sob, abdominal pain, n/v, diarrhea.

## 2023-05-13 ENCOUNTER — TRANSCRIPTION ENCOUNTER (OUTPATIENT)
Age: 83
End: 2023-05-13

## 2023-05-13 VITALS
DIASTOLIC BLOOD PRESSURE: 88 MMHG | HEART RATE: 74 BPM | SYSTOLIC BLOOD PRESSURE: 143 MMHG | OXYGEN SATURATION: 99 % | RESPIRATION RATE: 18 BRPM | TEMPERATURE: 98 F

## 2023-05-13 LAB
ANION GAP SERPL CALC-SCNC: 18 MMOL/L — HIGH (ref 7–14)
BUN SERPL-MCNC: 36 MG/DL — HIGH (ref 7–23)
CALCIUM SERPL-MCNC: 10.4 MG/DL — SIGNIFICANT CHANGE UP (ref 8.4–10.5)
CHLORIDE SERPL-SCNC: 99 MMOL/L — SIGNIFICANT CHANGE UP (ref 98–107)
CO2 SERPL-SCNC: 20 MMOL/L — LOW (ref 22–31)
CREAT SERPL-MCNC: 1.56 MG/DL — HIGH (ref 0.5–1.3)
EGFR: 33 ML/MIN/1.73M2 — LOW
GLUCOSE SERPL-MCNC: 93 MG/DL — SIGNIFICANT CHANGE UP (ref 70–99)
HCT VFR BLD CALC: 44.1 % — SIGNIFICANT CHANGE UP (ref 34.5–45)
HGB BLD-MCNC: 13.6 G/DL — SIGNIFICANT CHANGE UP (ref 11.5–15.5)
MAGNESIUM SERPL-MCNC: 2 MG/DL — SIGNIFICANT CHANGE UP (ref 1.6–2.6)
MCHC RBC-ENTMCNC: 26.9 PG — LOW (ref 27–34)
MCHC RBC-ENTMCNC: 30.8 GM/DL — LOW (ref 32–36)
MCV RBC AUTO: 87.2 FL — SIGNIFICANT CHANGE UP (ref 80–100)
NRBC # BLD: 0 /100 WBCS — SIGNIFICANT CHANGE UP (ref 0–0)
NRBC # FLD: 0 K/UL — SIGNIFICANT CHANGE UP (ref 0–0)
PHOSPHATE SERPL-MCNC: 3.9 MG/DL — SIGNIFICANT CHANGE UP (ref 2.5–4.5)
PLATELET # BLD AUTO: 205 K/UL — SIGNIFICANT CHANGE UP (ref 150–400)
POTASSIUM SERPL-MCNC: 4.7 MMOL/L — SIGNIFICANT CHANGE UP (ref 3.5–5.3)
POTASSIUM SERPL-SCNC: 4.7 MMOL/L — SIGNIFICANT CHANGE UP (ref 3.5–5.3)
RBC # BLD: 5.06 M/UL — SIGNIFICANT CHANGE UP (ref 3.8–5.2)
RBC # FLD: 14.1 % — SIGNIFICANT CHANGE UP (ref 10.3–14.5)
SODIUM SERPL-SCNC: 137 MMOL/L — SIGNIFICANT CHANGE UP (ref 135–145)
WBC # BLD: 9.7 K/UL — SIGNIFICANT CHANGE UP (ref 3.8–10.5)
WBC # FLD AUTO: 9.7 K/UL — SIGNIFICANT CHANGE UP (ref 3.8–10.5)

## 2023-05-13 RX ORDER — ACETAMINOPHEN 500 MG
3 TABLET ORAL
Qty: 0 | Refills: 0 | DISCHARGE
Start: 2023-05-13

## 2023-05-13 RX ORDER — LOSARTAN POTASSIUM 100 MG/1
25 TABLET, FILM COATED ORAL DAILY
Refills: 0 | Status: DISCONTINUED | OUTPATIENT
Start: 2023-05-13 | End: 2023-05-13

## 2023-05-13 RX ORDER — ONDANSETRON 8 MG/1
4 TABLET, FILM COATED ORAL EVERY 6 HOURS
Refills: 0 | Status: DISCONTINUED | OUTPATIENT
Start: 2023-05-13 | End: 2023-05-13

## 2023-05-13 RX ORDER — ACETAMINOPHEN 500 MG
975 TABLET ORAL EVERY 6 HOURS
Refills: 0 | Status: DISCONTINUED | OUTPATIENT
Start: 2023-05-13 | End: 2023-05-13

## 2023-05-13 RX ORDER — HYDRALAZINE HCL 50 MG
10 TABLET ORAL ONCE
Refills: 0 | Status: DISCONTINUED | OUTPATIENT
Start: 2023-05-13 | End: 2023-05-13

## 2023-05-13 RX ORDER — OXYCODONE HYDROCHLORIDE 5 MG/1
5 TABLET ORAL EVERY 6 HOURS
Refills: 0 | Status: DISCONTINUED | OUTPATIENT
Start: 2023-05-13 | End: 2023-05-13

## 2023-05-13 RX ORDER — OXYCODONE HYDROCHLORIDE 5 MG/1
2.5 TABLET ORAL EVERY 6 HOURS
Refills: 0 | Status: DISCONTINUED | OUTPATIENT
Start: 2023-05-13 | End: 2023-05-13

## 2023-05-13 RX ORDER — OXYCODONE HYDROCHLORIDE 5 MG/1
1 TABLET ORAL
Qty: 5 | Refills: 0
Start: 2023-05-13 | End: 2023-05-13

## 2023-05-13 RX ADMIN — SODIUM CHLORIDE 75 MILLILITER(S): 9 INJECTION, SOLUTION INTRAVENOUS at 00:45

## 2023-05-13 RX ADMIN — DORZOLAMIDE HYDROCHLORIDE 1 DROP(S): 20 SOLUTION/ DROPS OPHTHALMIC at 07:03

## 2023-05-13 RX ADMIN — HEPARIN SODIUM 5000 UNIT(S): 5000 INJECTION INTRAVENOUS; SUBCUTANEOUS at 07:02

## 2023-05-13 RX ADMIN — HEPARIN SODIUM 5000 UNIT(S): 5000 INJECTION INTRAVENOUS; SUBCUTANEOUS at 11:23

## 2023-05-13 RX ADMIN — PIPERACILLIN AND TAZOBACTAM 25 GRAM(S): 4; .5 INJECTION, POWDER, LYOPHILIZED, FOR SOLUTION INTRAVENOUS at 07:01

## 2023-05-13 NOTE — DISCHARGE NOTE NURSING/CASE MANAGEMENT/SOCIAL WORK - NSPROMEDSBROUGHTTOHOSP_GEN_A_NUR
----- Message from Brenda Floyd sent at 1/6/2020  8:11 AM CST -----  Contact: pt  Type:  Needs Medical Advice    Who Called: Patient  Symptoms (please be specific): light headache,high blood pressure   How long has patient had these symptoms:  Saturday  Would the patient rather a call back or a response via [facility]? Call back  Best Call Back Number: [phone]  Additional Information: pt states it comes and go   jorge luis, pt nonverbal

## 2023-05-13 NOTE — BRIEF OPERATIVE NOTE - OPERATION/FINDINGS
inflamed appendix and surrounding appendiceal fat. The base of the appendix was stapled using an EndoGIA 45mm tan load. The mesoappendix was taken down using a ligasure. Hemostasis achieved.

## 2023-05-13 NOTE — DISCHARGE NOTE PROVIDER - HOSPITAL COURSE
82F pmh dementia, nephrectomy, hysterectomy, HTN presenting with lethargy and weakness for past two days,  Daughter reports patient had lethargy and was not acting herself for the past two days, poor po intake without any food or drink since 5/11 lunch time. Denies f/n, did have chills, denies chest pain, sob, abdominal pain, n/v, diarrhea. found to have acute appendicitis on CT scan and a WBC of 14. Now s/p laparoscopic appendectomy. At the time of discharge, the patient was hemodynamically stable, was tolerating PO diet, was voiding urine and passing stool.  SHe was ambulating and was comfortable with adequate pain control.  The patient was instructed to follow up with Dr. Carrillo within 2 weeks after discharge from the hospital.  The patient / family felt comfortable with discharge.  The patient had no other issues. Per attending, patient deemed medically stable and ready for discharge at this time.

## 2023-05-13 NOTE — DISCHARGE NOTE NURSING/CASE MANAGEMENT/SOCIAL WORK - NSDCPEFALRISK_GEN_ALL_CORE
For information on Fall & Injury Prevention, visit: [email or web address] OR  [email or web address] OR  https://www.cdc.gov/steadi/patient.html

## 2023-05-13 NOTE — PATIENT PROFILE ADULT - FALL HARM RISK - FACTORS
"Anesthesia Post Evaluation    Patient: Alma Delia Tamayo    Procedure(s) Performed: Procedure(s) (LRB):  SPHINCTEROTOMY-LATERAL INTERNAL ANAL (N/A)    Final Anesthesia Type: general  Patient location during evaluation: PACU  Patient participation: Yes- Able to Participate  Level of consciousness: awake and alert  Post-procedure vital signs: reviewed and stable  Pain management: adequate  PONV status at discharge: No PONV  Anesthetic complications: no      Cardiovascular status: blood pressure returned to baseline  Respiratory status: unassisted  Hydration status: euvolemic  Follow-up not needed.        Visit Vitals  BP (!) 116/54   Pulse 73   Temp 36.6 °C (97.9 °F) (Temporal)   Resp 18   Ht 5' 2" (1.575 m)   Wt 65.8 kg (145 lb)   LMP  (Exact Date)   SpO2 100%   Breastfeeding? No   BMI 26.52 kg/m²       Pain/Charo Score: Pain Assessment Performed: Yes (8/31/2017 10:06 AM)  Presence of Pain: denies (8/31/2017 10:06 AM)  Charo Score: 10 (8/31/2017 10:06 AM)      "
Post Op

## 2023-05-13 NOTE — PROGRESS NOTE ADULT - SUBJECTIVE AND OBJECTIVE BOX
"Anesthesia Transfer of Care Note    Patient: Xin Taylor    Procedure(s) Performed: Procedure(s) (LRB):  COLONOSCOPY (N/A)    Patient location: PACU    Anesthesia Type: general    Transport from OR: Transported from OR on room air with adequate spontaneous ventilation    Post pain: adequate analgesia    Post assessment: no apparent anesthetic complications and tolerated procedure well    Post vital signs: stable    Level of consciousness: awake, alert and oriented    Nausea/Vomiting: no nausea/vomiting    Complications: none    Transfer of care protocol was followed      Last vitals:   Visit Vitals  BP (!) 142/76 (BP Location: Left arm)   Pulse 75   Temp 36.8 °C (98.3 °F) (Temporal)   Resp 12   Ht 5' 5" (1.651 m)   Wt 74.7 kg (164 lb 10.9 oz)   LMP  (LMP Unknown)   SpO2 98%   Breastfeeding No   BMI 27.40 kg/m²     " ANESTHESIA POSTOP CHECK    82y Female POSTOP DAY 1 S/P Lap Appy    Vital Signs Last 24 Hrs  T(C): 36.7 (13 May 2023 16:00), Max: 37.2 (13 May 2023 07:00)  T(F): 98.1 (13 May 2023 16:00), Max: 98.9 (13 May 2023 07:00)  HR: 74 (13 May 2023 16:00) (56 - 86)  BP: 143/88 (13 May 2023 16:00) (121/58 - 183/69)  BP(mean): 95 (13 May 2023 02:15) (93 - 100)  RR: 18 (13 May 2023 16:00) (12 - 18)  SpO2: 99% (13 May 2023 16:00) (95% - 100%)    Parameters below as of 13 May 2023 16:00  Patient On (Oxygen Delivery Method): room air      I&O's Summary    12 May 2023 07:01  -  13 May 2023 07:00  --------------------------------------------------------  IN: 150 mL / OUT: 300 mL / NET: -150 mL        [X] NO APPARENT ANESTHESIA COMPLICATIONS      Comments:

## 2023-05-13 NOTE — DISCHARGE NOTE NURSING/CASE MANAGEMENT/SOCIAL WORK - PATIENT PORTAL LINK FT
You can access the FollowMyHealth Patient Portal offered by [facility] by registering at the following website: [email or web address]. By joining [facility]’s FollowMyHealth portal, you will also be able to view your health information using other applications (apps) compatible with our system.

## 2023-05-13 NOTE — DISCHARGE NOTE PROVIDER - CARE PROVIDER_API CALL
Delaney Carrillo)  Critical Care Medicine; Surgery  [phone] [place] [address]  Phone: [phone]  Fax: [phone]  Follow Up Time: 2 weeks

## 2023-05-13 NOTE — DISCHARGE NOTE PROVIDER - NSDCMRMEDTOKEN_GEN_ALL_CORE_FT
acetaminophen 325 mg oral tablet: 3 tab(s) orally every 6 hours  Combigan 0.2%-0.5% ophthalmic solution: 1 drop(s) to each affected eye every 12 hours (home med)  dorzolamide 2% ophthalmic solution: 1 drop(s) to each affected eye 2 times a day (home med)  losartan 25 mg oral tablet: 1 tab(s) orally once a day  Multi Vitamin+: orally once a day/ LD on 02/15/23  oxyCODONE 5 mg oral tablet: 1 tab(s) orally every 4 hours MDD: 4  Restasis 0.05% ophthalmic emulsion: 1 drop(s) in each eye every 12 hours  rosuvastatin 20 mg oral tablet: 1 tab(s) orally once a day (at bedtime)

## 2023-05-13 NOTE — CHART NOTE - NSCHARTNOTEFT_GEN_A_CORE
Surgery Post-Op Note    Pre-Op Dx: Acute appendicitis     Procedure: Laparoscopic appendectomy      Surgeon: Gerardo    SUBJECTIVE:  Pt seen and examined at the bedside.    OBJECTIVE:  Vital Signs Last 24 Hrs  T(C): 36.4 (13 May 2023 03:35), Max: 36.8 (12 May 2023 12:11)  T(F): 97.5 (13 May 2023 03:35), Max: 98.3 (12 May 2023 12:11)  HR: 86 (13 May 2023 03:35) (56 - 86)  BP: 156/70 (13 May 2023 03:35) (121/58 - 183/69)  BP(mean): 95 (13 May 2023 02:15) (93 - 100)  RR: 18 (13 May 2023 03:35) (12 - 18)  SpO2: 100% (13 May 2023 03:35) (95% - 100%)    Parameters below as of 13 May 2023 03:35  Patient On (Oxygen Delivery Method): room air        Physical Exam:  General: NAD, asleep  Pulmonary: Nonlabored breathing, no respiratory distress  Abdominal: soft, port sites cdi  Extremities: WWP    LABS:                        14.1   14.14 )-----------( 255      ( 12 May 2023 13:30 )             43.5     05-12    137  |  97<L>  |  34<H>  ----------------------------<  121<H>  4.4   |  25  |  1.86<H>    Ca    10.6<H>      12 May 2023 13:30    TPro  8.1  /  Alb  3.7  /  TBili  0.7  /  DBili  x   /  AST  23  /  ALT  9   /  AlkPhos  100  05-12    PT/INR - ( 12 May 2023 20:33 )   PT: 13.4 sec;   INR: 1.15 ratio         PTT - ( 12 May 2023 20:33 )  PTT:27.2 sec  CAPILLARY BLOOD GLUCOSE        Urinalysis Basic - ( 12 May 2023 17:07 )    Color: Yellow / Appearance: Slightly Turbid / S.022 / pH: x  Gluc: x / Ketone: Negative  / Bili: Negative / Urobili: <2 mg/dL   Blood: x / Protein: >600 mg/dL / Nitrite: Negative   Leuk Esterase: Negative / RBC: 7 /HPF / WBC 6 /HPF   Sq Epi: x / Non Sq Epi: x / Bacteria: Moderate      LIVER FUNCTIONS - ( 12 May 2023 13:30 )  Alb: 3.7 g/dL / Pro: 8.1 g/dL / ALK PHOS: 100 U/L / ALT: 9 U/L / AST: 23 U/L / GGT: x           ABO Interpretation: O ( @ 20:38)      IMAGING:    ASSESSMENT:82y Female now 4hours s/p laparoscopic appendectomy     PLAN:  - home meds  - reg diet  - zosyn  - TOV 8am  - am labs  - pain control

## 2023-05-13 NOTE — PATIENT PROFILE ADULT - FALL HARM RISK - HARM RISK INTERVENTIONS
Assistance with ambulation/Assistance OOB with selected safe patient handling equipment/Communicate Risk of Fall with Harm to all staff/Monitor gait and stability/Orthostatic vital signs/Reinforce activity limits and safety measures with patient and family/Sit up slowly, dangle for a short time, stand at bedside before walking/Tailored Fall Risk Interventions/Use of alarms - bed, chair and/or voice tab/Visual Cue: Yellow wristband and red socks/Bed in lowest position, wheels locked, appropriate side rails in place/Call bell, personal items and telephone in reach/Instruct patient to call for assistance before getting out of bed or chair/Non-slip footwear when patient is out of bed/[place] to call system/Physically safe environment - no spills, clutter or unnecessary equipment/Purposeful Proactive Rounding/Room/bathroom lighting operational, light cord in reach

## 2023-05-13 NOTE — DISCHARGE NOTE PROVIDER - NSDCFUSCHEDAPPT_GEN_ALL_CORE_FT
[facility] Physician [facility]  [place]  [facility]  Scheduled Appointment: 05/19/2023    Emily Hoyt  [facility]  GERIATRICS [address]   Scheduled Appointment: 06/13/2023    [facility]  DISPENSARY [address]   Scheduled Appointment: 06/20/2023

## 2023-05-14 LAB
CULTURE RESULTS: SIGNIFICANT CHANGE UP
SPECIMEN SOURCE: SIGNIFICANT CHANGE UP

## 2023-05-18 LAB
CULTURE RESULTS: SIGNIFICANT CHANGE UP
CULTURE RESULTS: SIGNIFICANT CHANGE UP
SPECIMEN SOURCE: SIGNIFICANT CHANGE UP
SPECIMEN SOURCE: SIGNIFICANT CHANGE UP

## 2023-05-19 ENCOUNTER — OUTPATIENT (OUTPATIENT)
Dept: OUTPATIENT SERVICES | Facility: HOSPITAL | Age: 83
LOS: 1 days | Discharge: HOME | End: 2023-05-19

## 2023-05-19 ENCOUNTER — APPOINTMENT (OUTPATIENT)
Dept: SPEECH THERAPY | Facility: CLINIC | Age: 83
End: 2023-05-19

## 2023-05-19 DIAGNOSIS — Z90.5 ACQUIRED ABSENCE OF KIDNEY: Chronic | ICD-10-CM

## 2023-05-19 DIAGNOSIS — Z98.890 OTHER SPECIFIED POSTPROCEDURAL STATES: Chronic | ICD-10-CM

## 2023-05-19 NOTE — REASON FOR VISIT
[Initial] : initial visit for [Audiology Evaluation] : audiology evaluation [Audiogram] : audiogram [Other: _____] : [unfilled]

## 2023-05-22 NOTE — PLAN
[FreeTextEntry2] : ENT evaluation re: cerumen removal. \par Audiological evaluation post cerumen removal. \par

## 2023-05-22 NOTE — HISTORY OF PRESENT ILLNESS
[Hearing Aid ___] : hearing aid(s) [unfilled] [Difficulty hearing in group setting] : difficulty hearing in group setting [Ear Infections] : no ear infections [Drainage from ear] : no drainage from ear [Ear, Nose or Throat Surgery] : no ear, nose or throat surgery [FreeTextEntry1] : 81 yo female referred for audiological evaluation. Hx of Dementia. Patient recently moved to [place] 1 year ago- living with daughter. She was fit with binaural hearing aids in [place], however daughter is interested in obtaining new hearing aids as current do not fit properly. \par Difficulties hearing include individual speakers, groups/noise, and she listens to TV loudly.  [FreeTextEntry8] : Patient has hearing aid evaluation scheduled with [facility] for 6/20/23.

## 2023-05-22 NOTE — ASSESSMENT
[FreeTextEntry1] : Testing discontinued due to occluding cerumen. Advised to have cerumen removed with audiological evaluation following- she was agreeable. Provided contact information for [facility] ENT department.

## 2023-05-25 DIAGNOSIS — H90.0 CONDUCTIVE HEARING LOSS, BILATERAL: ICD-10-CM

## 2023-05-31 LAB — SURGICAL PATHOLOGY STUDY: SIGNIFICANT CHANGE UP

## 2023-06-09 ENCOUNTER — APPOINTMENT (OUTPATIENT)
Dept: TRAUMA SURGERY | Facility: HOSPITAL | Age: 83
End: 2023-06-09
Payer: MEDICARE

## 2023-06-09 VITALS
TEMPERATURE: 97.9 F | BODY MASS INDEX: 26.58 KG/M2 | HEART RATE: 63 BPM | WEIGHT: 150 LBS | SYSTOLIC BLOOD PRESSURE: 154 MMHG | HEIGHT: 63 IN | DIASTOLIC BLOOD PRESSURE: 81 MMHG

## 2023-06-09 PROCEDURE — 99024 POSTOP FOLLOW-UP VISIT: CPT

## 2023-06-12 ENCOUNTER — NON-APPOINTMENT (OUTPATIENT)
Age: 83
End: 2023-06-12

## 2023-06-13 NOTE — ASSESSMENT
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 Pt is of good functional status (walks with walker, does some self care, does not cook). After discussion with surgical oncologist, would not recommend up-front R colectomy, as the margins were clear, because this would mainly serve to limit recurrence and obtain nodes for staging. Will refer to Dr. Stacia Sandoval for medical oncology to assess for possible chemotherapy. Would also recommend colonoscopy to evaluate partially resected polyp in the proximal appendiceal resection margin.

## 2023-06-13 NOTE — PLAN
[FreeTextEntry1] : - Refer to Dr. Stacia Sandoval for consideration of chemotherapy \par - GI for colonoscopy \par - follow up as needed

## 2023-06-13 NOTE — HISTORY OF PRESENT ILLNESS
[de-identified] : 81yo F s/p laparoscopic appendectomy on 5/12, presents for routine follow up. Per patient and daughter, pt has been feeling well. Denies abdominal pain, tolerating regular diet, having regular bowel movements.

## 2023-06-13 NOTE — PHYSICAL EXAM
[Normal Breath Sounds] : Normal breath sounds [Normal Heart Sounds] : normal heart sounds [Alert] : alert [Oriented to Person] : oriented to person [Oriented to Place] : disoriented to place [Oriented to Time] : disoriented to time [Calm] : calm [de-identified] : NAD, confused [de-identified] : Soft, nondistended, nontender, incisions well healed.  [de-identified] : No deformities

## 2023-06-13 NOTE — DATA REVIEWED
[FreeTextEntry1] : Pathology: goblet cell adenocarcinoma, Grade 1, arising in a background of acute appendicitis with periappendicitis. Tumor invades muscularis propria into subserosa and mesoappendix, margins of resection negative for adenocarcinoma. Serrated polyp at proximal resection margin. extensive perineural invasion. \par

## 2023-06-14 NOTE — PHYSICAL THERAPY INITIAL EVALUATION ADULT - RISK REDUCTION/PREVENTION, PT EVAL
pt breathing heavy and appears anxious, speaking clearly, Breath sounds clear and equal bilaterally.
risk factors

## 2023-06-16 ENCOUNTER — OUTPATIENT (OUTPATIENT)
Dept: OUTPATIENT SERVICES | Facility: HOSPITAL | Age: 83
LOS: 1 days | Discharge: HOME | End: 2023-06-16

## 2023-06-16 DIAGNOSIS — C18.1 MALIGNANT NEOPLASM OF APPENDIX: ICD-10-CM

## 2023-06-16 DIAGNOSIS — Z90.5 ACQUIRED ABSENCE OF KIDNEY: Chronic | ICD-10-CM

## 2023-06-16 DIAGNOSIS — Z98.890 OTHER SPECIFIED POSTPROCEDURAL STATES: Chronic | ICD-10-CM

## 2023-06-21 ENCOUNTER — APPOINTMENT (OUTPATIENT)
Dept: HEMATOLOGY ONCOLOGY | Facility: CLINIC | Age: 83
End: 2023-06-21
Payer: MEDICARE

## 2023-06-21 ENCOUNTER — NON-APPOINTMENT (OUTPATIENT)
Age: 83
End: 2023-06-21

## 2023-06-21 VITALS
SYSTOLIC BLOOD PRESSURE: 138 MMHG | HEART RATE: 92 BPM | DIASTOLIC BLOOD PRESSURE: 75 MMHG | WEIGHT: 152.12 LBS | OXYGEN SATURATION: 96 % | RESPIRATION RATE: 16 BRPM | TEMPERATURE: 97 F | BODY MASS INDEX: 26.95 KG/M2

## 2023-06-21 PROCEDURE — 99205 OFFICE O/P NEW HI 60 MIN: CPT

## 2023-06-23 ENCOUNTER — NON-APPOINTMENT (OUTPATIENT)
Age: 83
End: 2023-06-23

## 2023-06-23 ENCOUNTER — APPOINTMENT (OUTPATIENT)
Dept: GASTROENTEROLOGY | Facility: CLINIC | Age: 83
End: 2023-06-23
Payer: MEDICARE

## 2023-06-23 PROCEDURE — 99442: CPT | Mod: 95

## 2023-06-27 ENCOUNTER — APPOINTMENT (OUTPATIENT)
Dept: GASTROENTEROLOGY | Facility: HOSPITAL | Age: 83
End: 2023-06-27

## 2023-06-27 ENCOUNTER — OUTPATIENT (OUTPATIENT)
Dept: OUTPATIENT SERVICES | Facility: HOSPITAL | Age: 83
LOS: 1 days | Discharge: HOME | End: 2023-06-27
Payer: MEDICARE

## 2023-06-27 ENCOUNTER — RESULT REVIEW (OUTPATIENT)
Age: 83
End: 2023-06-27

## 2023-06-27 VITALS
SYSTOLIC BLOOD PRESSURE: 151 MMHG | RESPIRATION RATE: 14 BRPM | HEART RATE: 89 BPM | TEMPERATURE: 98 F | OXYGEN SATURATION: 99 % | DIASTOLIC BLOOD PRESSURE: 88 MMHG

## 2023-06-27 VITALS
RESPIRATION RATE: 15 BRPM | HEART RATE: 66 BPM | DIASTOLIC BLOOD PRESSURE: 66 MMHG | SYSTOLIC BLOOD PRESSURE: 140 MMHG | OXYGEN SATURATION: 97 %

## 2023-06-27 DIAGNOSIS — Z90.5 ACQUIRED ABSENCE OF KIDNEY: Chronic | ICD-10-CM

## 2023-06-27 DIAGNOSIS — D37.3 NEOPLASM OF UNCERTAIN BEHAVIOR OF APPENDIX: ICD-10-CM

## 2023-06-27 DIAGNOSIS — Z98.890 OTHER SPECIFIED POSTPROCEDURAL STATES: Chronic | ICD-10-CM

## 2023-06-27 PROCEDURE — 88305 TISSUE EXAM BY PATHOLOGIST: CPT | Mod: 26

## 2023-06-27 PROCEDURE — 45385 COLONOSCOPY W/LESION REMOVAL: CPT

## 2023-06-27 PROCEDURE — 45380 COLONOSCOPY AND BIOPSY: CPT | Mod: 59

## 2023-06-27 NOTE — PRE PROCEDURE NOTE - PRE PROCEDURE EVALUATION
Pre-Endoscopy Evaluation    Referring Physician:                                    Dr Stacia Sandoval    Indication for Procedure:  appendiceal CA (goblet cell)      Sedation by Anesthesia [X ]    PAST MEDICAL & SURGICAL HISTORY:  Dementia      HTN (hypertension)      HLD (hyperlipidemia)      H/O sinus bradycardia      Vasovagal syncope      Glaucoma      H/O right nephrectomy      H/O endoscopy          Latex allergy: [ ] yes [X] no    Smoking: [ ] yes  [X] no    AICD/PPM: [ ] yes   [X] no    Pertinent lab data:                      Physical Examination:  Daily     Daily   Vital Signs Last 24 Hrs  T(C): 36.1 (27 Jun 2023 11:54), Max: 36.6 (27 Jun 2023 10:54)  T(F): 97 (27 Jun 2023 11:54), Max: 97.8 (27 Jun 2023 10:54)  HR: 66 (27 Jun 2023 12:30) (61 - 89)  BP: 140/66 (27 Jun 2023 12:30) (131/57 - 151/88)  BP(mean): --  RR: 15 (27 Jun 2023 12:30) (12 - 15)  SpO2: 97% (27 Jun 2023 12:30) (97% - 99%)    Parameters below as of 27 Jun 2023 12:30  Patient On (Oxygen Delivery Method): room air        Constitutional: NAD    HEENT: PERRLA, EOMI,       Neck:  No JVD    Respiratory: CTAB/L    Cardiovascular: S1 and S2    Gastrointestinal: BS+, soft, NT/ND    Extremities: No peripheral edema    Neurological: A/O x 3, no focal deficits    Psychiatric: Normal mood, normal affect    : No Stark    Skin: No rashes    Comments:    ASA Class: I [ ]  II [ ]  III [X ]  IV [ ]    The patient is a suitable candidate for the planned procedure unless box checked [ ]  No, explain:

## 2023-06-30 LAB — SURGICAL PATHOLOGY STUDY: SIGNIFICANT CHANGE UP

## 2023-07-05 ENCOUNTER — APPOINTMENT (OUTPATIENT)
Dept: PHARMACY | Facility: CLINIC | Age: 83
End: 2023-07-05
Payer: SELF-PAY

## 2023-07-05 ENCOUNTER — APPOINTMENT (OUTPATIENT)
Dept: OTOLARYNGOLOGY | Facility: CLINIC | Age: 83
End: 2023-07-05
Payer: MEDICARE

## 2023-07-05 VITALS
BODY MASS INDEX: 26.93 KG/M2 | SYSTOLIC BLOOD PRESSURE: 170 MMHG | DIASTOLIC BLOOD PRESSURE: 49 MMHG | HEART RATE: 63 BPM | HEIGHT: 63 IN | WEIGHT: 152 LBS

## 2023-07-05 PROCEDURE — 99204 OFFICE O/P NEW MOD 45 MIN: CPT

## 2023-07-05 PROCEDURE — V5010 ASSESSMENT FOR HEARING AID: CPT

## 2023-07-05 PROCEDURE — 92567 TYMPANOMETRY: CPT

## 2023-07-05 PROCEDURE — 92557 COMPREHENSIVE HEARING TEST: CPT

## 2023-07-05 RX ORDER — ASPIRIN ENTERIC COATED TABLETS 81 MG 81 MG/1
81 TABLET, DELAYED RELEASE ORAL
Refills: 0 | Status: DISCONTINUED | COMMUNITY
End: 2023-07-05

## 2023-07-05 RX ORDER — AMLODIPINE BESYLATE 5 MG/1
5 TABLET ORAL
Refills: 0 | Status: DISCONTINUED | COMMUNITY
End: 2023-07-05

## 2023-07-05 NOTE — HISTORY OF PRESENT ILLNESS
[Difficulty hearing in group setting] : difficulty hearing in group setting [Ear Infections] : no ear infections [Imbalance or Dizziness] : no imbalance or dizziness [Ear, Nose or Throat Surgery] : no ear, nose or throat surgery [FreeTextEntry1] : 82 year old female arrived for hearing aid evaluation-- referred by Dr. Miller. Hx of dementia. 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 Pt dx with hypertension and high cholesterol. Patient recently moved to [place] 1 year ago- living with daughter. She was previously fit with binaural hearing aids in [place], however daughter is interested in obtaining new hearing aids as current do not fit properly.  \par Difficulties hearing include individual speakers, groups/noise, on the phone, and watching tv.

## 2023-07-05 NOTE — ASSESSMENT
[FreeTextEntry1] : Performed DEMO of [employer]on MORE 1 hearing aids to pt's hearing loss at [facility] 2. Patient noted difference and was hearing her daughter well in quiet office environment. Counseled regarding styles of hearing aids, technology levels, manufacturers, and various features including rechargeable and direct streaming. \par Counseled re: realistic expectations and acclimatization to devices. \par \par Patient and daughter selected Zircon 1 MR-R hearing aids based on patient's lifestyle. Patient chose chestnut brown color. Ordered with 2(85) receivers and 8mm DV domes AU. \par \par Informed of warranties, 45-day trial period, and patient financial responsibility.

## 2023-07-07 NOTE — HISTORY OF PRESENT ILLNESS
[de-identified] : 83 yo F with history of dementia and hearing loss over 5 years ago presents for cerumen removal and clearance for new hearing aids. Was wearing hearing aids for the past few years. Occasional tinnitus and otalgia AS. No ear infections, dizziness or headaches related to hearing loss. Hx of vertigo but not within the last year.  No hx of head trauma or exposure to loud noises. Son with hearing loss starting in his 50s (worked in the [employer]). No recent imaging. Last audiogram was over a year ago.

## 2023-07-09 NOTE — ASSESSMENT
[Curative] : Goals of care discussed with patient: Curative [Palliative Care Plan] : not applicable at this time [Designated Health Care Proxy] : Designated Health Care Proxy [Name: ___] : Name: [unfilled] [Relationship: ___] : Relationship: [unfilled] [Full Code] : full code

## 2023-07-10 NOTE — PHYSICAL EXAM
[Ambulatory and capable of all self care but unable to carry out any work activities] : Status 2- Ambulatory and capable of all self care but unable to carry out any work activities. Up and about more than 50% of waking hours [Normal] : normal appearance, no rash, nodules, vesicles, ulcers, erythema [de-identified] : AAO1-2 [de-identified] : flat affect

## 2023-07-10 NOTE — REVIEW OF SYSTEMS
[Fatigue] : fatigue [Muscle Weakness] : muscle weakness [Confused] : confusion [Difficulty Walking] : difficulty walking [Negative] : Allergic/Immunologic [Vomiting] : no vomiting [Joint Pain] : no joint pain [Joint Stiffness] : no joint stiffness [Muscle Pain] : no muscle pain [FreeTextEntry7] : chronic constipation

## 2023-07-10 NOTE — HISTORY OF PRESENT ILLNESS
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 \par \colby Gruber is accompanied by her daughter and granddaughter who provide history for her given her short term memory loss. Reportedly pt had been c/o LLQ and RLQ pain x 3-4 mos. Pain was intermittent, at times severe, would last for 30 min. Would take Tylenol PRN with improvement at times. Appetite and weight has always been stable. \par \colby Gruber was brought in by her daughter on 5/12/23 to [facility] ER due to lethargy and weakness for two days. Pt was not eating or drinking and daughter was concerned. Initial CT A/P on 5/12/23 showed acute uncomplicated appendicitis. She was taken to the OR on 5/12/23 and underwent a simple appendectomy. Final path showed goblet cell adenocarcinoma, grade 1, T3NX. Serrated polyp at proximal resection margin. extensive perineural invasion. Post op course unremarkable. Pt was evaluated by Dr. Carrillo, surgery who did not recommend R hemicolectomy. \par \par Referred to medical oncology for further management. \par  \par In terms of dementia- patient is AOx1-2, without capacity for medical decision making due to poor executive function and short term memory issues. Her daughter is the HCP.  Pt is able to perform ADLs but needs assistance with some things. 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 No fevers, chills, night sweats, CP, SOB, urine issues.\par

## 2023-07-10 NOTE — REASON FOR VISIT
[Initial Consultation] : an initial consultation [Family Member] : family member [FreeTextEntry2] : Appendiceal cancer

## 2023-07-12 ENCOUNTER — APPOINTMENT (OUTPATIENT)
Dept: OTOLARYNGOLOGY | Facility: CLINIC | Age: 83
End: 2023-07-12

## 2023-07-17 ENCOUNTER — NON-APPOINTMENT (OUTPATIENT)
Age: 83
End: 2023-07-17

## 2023-07-17 ENCOUNTER — RESULT REVIEW (OUTPATIENT)
Age: 83
End: 2023-07-17

## 2023-07-19 NOTE — PATIENT PROFILE ADULT - STATED REASON FOR ADMISSION
Health Maintenance and immunizations reviewed/ updated.  CALLED PT PERTAINING TO [facility] REFERRAL. (LVM)   Health Maintenance Due   Topic Date Due    Shingles Vaccine (1 of 2) Never done    COVID-19 Vaccine (4 - Moderna series) 01/25/2022    Colorectal Cancer Screening  08/13/2023      abd pain

## 2023-07-21 ENCOUNTER — APPOINTMENT (OUTPATIENT)
Dept: MRI IMAGING | Facility: CLINIC | Age: 83
End: 2023-07-21
Payer: MEDICARE

## 2023-07-21 ENCOUNTER — APPOINTMENT (OUTPATIENT)
Dept: CT IMAGING | Facility: CLINIC | Age: 83
End: 2023-07-21
Payer: MEDICARE

## 2023-07-21 ENCOUNTER — OUTPATIENT (OUTPATIENT)
Dept: OUTPATIENT SERVICES | Facility: HOSPITAL | Age: 83
LOS: 1 days | End: 2023-07-21
Payer: MEDICARE

## 2023-07-21 DIAGNOSIS — C18.1 MALIGNANT NEOPLASM OF APPENDIX: ICD-10-CM

## 2023-07-21 DIAGNOSIS — Z90.5 ACQUIRED ABSENCE OF KIDNEY: Chronic | ICD-10-CM

## 2023-07-21 DIAGNOSIS — Z98.890 OTHER SPECIFIED POSTPROCEDURAL STATES: Chronic | ICD-10-CM

## 2023-07-21 PROCEDURE — 74183 MRI ABD W/O CNTR FLWD CNTR: CPT | Mod: 26,MH

## 2023-07-21 PROCEDURE — A9585: CPT

## 2023-07-21 PROCEDURE — 72197 MRI PELVIS W/O & W/DYE: CPT

## 2023-07-21 PROCEDURE — 74183 MRI ABD W/O CNTR FLWD CNTR: CPT

## 2023-07-21 PROCEDURE — 71250 CT THORAX DX C-: CPT | Mod: 26,MH

## 2023-07-21 PROCEDURE — 72197 MRI PELVIS W/O & W/DYE: CPT | Mod: 26,MH

## 2023-07-21 PROCEDURE — 71250 CT THORAX DX C-: CPT

## 2023-07-27 PROBLEM — C18.1: Status: ACTIVE | Noted: 2023-06-13

## 2023-07-28 ENCOUNTER — APPOINTMENT (OUTPATIENT)
Dept: SURGICAL ONCOLOGY | Facility: CLINIC | Age: 83
End: 2023-07-28
Payer: MEDICARE

## 2023-07-28 ENCOUNTER — NON-APPOINTMENT (OUTPATIENT)
Age: 83
End: 2023-07-28

## 2023-07-28 VITALS
OXYGEN SATURATION: 99 % | DIASTOLIC BLOOD PRESSURE: 82 MMHG | WEIGHT: 150 LBS | BODY MASS INDEX: 26.58 KG/M2 | RESPIRATION RATE: 16 BRPM | HEART RATE: 70 BPM | HEIGHT: 63 IN | SYSTOLIC BLOOD PRESSURE: 142 MMHG

## 2023-07-28 DIAGNOSIS — C18.1 MALIGNANT NEOPLASM OF APPENDIX: ICD-10-CM

## 2023-07-28 PROCEDURE — 99205 OFFICE O/P NEW HI 60 MIN: CPT

## 2023-07-28 NOTE — HISTORY OF PRESENT ILLNESS
[de-identified] : 83F with dementia (A&OX1-2 by report, but here she is A&OX3) presents for an initial consultation for an appendiceal goblet cell adenocarcinoma, s/p simple appendectomy, referred by Dr Sandoval for discussion of risk vs benefit for further surgery. \par \par Reportedly pt had been c/o LLQ and RLQ pain x 3-4 mos. Pain was intermittent, at times severe, would last for 30 min. Would take Tylenol PRN with improvement at times. Appetite and weight has always been stable. She was brought in by her daughter on 23 to [facility] ER due to lethargy and weakness for two days. Pt was not eating or drinking and daughter was concerned. Initial CT A/P on 23 showed acute uncomplicated appendicitis. She was taken to the OR on 23 by Dr Carrillo and underwent a laparoscopic appendectomy. Final path showed goblet cell adenocarcinoma, grade 1, T3NX. Serrated polyp at proximal resection margin. extensive perineural invasion. Post op course unremarkable. Pt was evaluated by Dr. Carrillo, surgery, who discussed risks and benefits of right hemicolectomy. \par \par Per Dr Sandoval's note: In terms of dementia- patient is AOx1-2, without capacity for medical decision making due to poor executive function and short term memory issues. Her daughter is the HCP. Pt is able to perform ADLs but needs assistance with some things. 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 \par \par On 23 she underwent a colonoscopy with Dr Rodriguez, it showed abnormal mucosa at the appendiceal orifice, diverticulosis, and multiple polyps. Pathology was benign. \par \par CT chest 23 Nonspecific few bilateral pulmonary nodules measuring up to 3 mm\par MRI abdomen 23 No evidence of metastatic disease\par \par She is able to walk with a cane. she denies any pain, has normal BM, no fevers or chills. She know her name and , and knows shes in the doctors office. \par ***Jehova's witness***-no blood products. 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 \par PSH: appendectomy,  right nephrectomy.\par meds: Losartan, rosuvastatin. \par Social: Lives with daughter, never smoker or drinker.

## 2023-07-28 NOTE — ASSESSMENT
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 \par In addition, she is a [employer]. \par Comorbidities include dementia (advanced per reports, but not currently on medication and answer most questions appropriately today), history of right nephrectomy for unclear diagnosis- performed at age 13 in [place], sinus bradycardia. \par Has a history of falls. Here with a wheelchair today, but does not use at home. \par Able to shower, toilet independently.\par Lives with her daughter.  \par \par We discussed that standard of care includes completion right hemicolectomy with lymph node sampling for standing and potentially therapeutic purposes. \par \par We discussed risks of bleeding, infection, anastomotic leak, cardiopulmonary and renal complications, blood clot, hernia, and death. \par \par We discussed risk of cognitive decline and potential need for rehab facility postoperatively. \par \par She is scheduled to see geriatrics next week and I encouraged her to keep that appointment. \par \par We attempted to calculated perioperative risk based on ACS risk calculator w geriatric assessment, but unfortunately after multiple attempts the tool is not working. Will try again in the future. \par \par She is unsure whether she would like to pursue additional surgery and I encouraged her to discuss with her family and at geriatrics appt. She will call my office if she makes a decision or would like to schedule an additional time to discuss further. \par \par

## 2023-07-28 NOTE — PHYSICAL EXAM
[FreeTextEntry1] : General - pleasant well developed \par Head/Face - Atraumatic\par Eyes - PERRL, no sclera icterus\par ENMT - MMM, normal nose/ears, no oropharyngeal lesion\par Neck - supple, no thyromegaly, no masses   \par Lymphatics - no palpable adenopathy\par CV - Regular Rate and Rhythm\par Pulm - CTAB\par Abd - soft, non-tender, nondistended,well healed surgical incision. Healed incision on right flank. \par Skin - no skin lesion on sun exposed skin, no jaundice\par Neuro - alert and oriented x 3\par Psych - normal mood and affect\par \par

## 2023-07-28 NOTE — CONSULT LETTER
[Dear  ___] : Dear  [unfilled], [Consult Letter:] : I had the pleasure of evaluating your patient, [unfilled]. [Consult Closing:] : Thank you very much for allowing me to participate in the care of this patient.  If you have any questions, please do not hesitate to contact me. [Sincerely,] : Sincerely, [FreeTextEntry3] : Elvia Ríos MD\par

## 2023-07-28 NOTE — REVIEW OF SYSTEMS
[Negative] : Genitourinary [de-identified] : walks with cane, currently in wheelchair.  [de-identified] : A+Ox2, knows who president is.

## 2023-08-09 ENCOUNTER — APPOINTMENT (OUTPATIENT)
Dept: GERIATRICS | Facility: CLINIC | Age: 83
End: 2023-08-09
Payer: MEDICARE

## 2023-08-09 VITALS
HEIGHT: 63 IN | OXYGEN SATURATION: 99 % | WEIGHT: 147.38 LBS | BODY MASS INDEX: 26.11 KG/M2 | SYSTOLIC BLOOD PRESSURE: 138 MMHG | DIASTOLIC BLOOD PRESSURE: 78 MMHG | RESPIRATION RATE: 16 BRPM | HEART RATE: 63 BPM

## 2023-08-09 DIAGNOSIS — Z11.59 ENCOUNTER FOR SCREENING FOR OTHER VIRAL DISEASES: ICD-10-CM

## 2023-08-09 DIAGNOSIS — Z13.820 ENCOUNTER FOR SCREENING FOR OSTEOPOROSIS: ICD-10-CM

## 2023-08-09 DIAGNOSIS — Z12.83 ENCOUNTER FOR SCREENING FOR MALIGNANT NEOPLASM OF SKIN: ICD-10-CM

## 2023-08-09 DIAGNOSIS — Z13.29 ENCOUNTER FOR SCREENING FOR OTHER SUSPECTED ENDOCRINE DISORDER: ICD-10-CM

## 2023-08-09 DIAGNOSIS — F05 DELIRIUM DUE TO KNOWN PHYSIOLOGICAL CONDITION: ICD-10-CM

## 2023-08-09 DIAGNOSIS — R26.89 OTHER ABNORMALITIES OF GAIT AND MOBILITY: ICD-10-CM

## 2023-08-09 PROCEDURE — 99205 OFFICE O/P NEW HI 60 MIN: CPT

## 2023-08-09 RX ORDER — MULTIVITAMIN WITH FOLIC ACID 400 MCG
TABLET ORAL DAILY
Refills: 0 | Status: ACTIVE | COMMUNITY
Start: 2023-08-09

## 2023-08-09 NOTE — REVIEW OF SYSTEMS
[Loss Of Hearing] : hearing loss [Incontinence] : incontinence [As Noted in HPI] : as noted in HPI [Negative] : Heme/Lymph [FreeTextEntry9] : occasional back pain

## 2023-08-09 NOTE — PHYSICAL EXAM
[Sclera] : the sclera and conjunctiva were normal [Normal Outer Ear/Nose] : the ears and nose were normal in appearance [Normal] : normal skin color and pigmentation [No Focal Deficits] : no focal deficits [Normal Hearing] : hearing was not normal [Normal Gait] : abnormal gait [Normal Insight/Judgment] : insight and judgment were not intact [de-identified] : slow, cautious unsteady gait, better with cane and walker  [de-identified] : confused, unsteady gait  [de-identified] : calm, cooperative, difficult to follow instructions

## 2023-08-09 NOTE — HISTORY OF PRESENT ILLNESS
[Two or more falls in past year] : Patient reported two or more falls in the past year [Patient is independent with] : bathing [Independent] : transferring/mobility [Full assistance needed] : Assistance needed managing medications [] : Assistance needed managing finances. [Cane] : cane [Walker] : walker [Smoke Detector] : smoke detector [Carbon Monoxide Detector] : carbon monoxide detector [Shower Chair] : shower chair [Night Light] : night light [Wears Seat Belt] : wears seat belt [0] : 2) Feeling down, depressed, or hopeless: Not at all (0) [PHQ-2 Negative - No further assessment needed] : PHQ-2 Negative - No further assessment needed [FAST Score: ____] : Functional Assessment Scale (FAST) Score: [unfilled] [FreeTextEntry1] : PMD Dr. Yamila Neri JEHOVA'S WITNESS   Patient denies having any complaints today and unaware of reason for today's appointment. Defers history to daughter.   Dtr states pt c/o burning with urination for past week and urine has a "funny odor."   Dtrs states pt moved to [place] in 5/22 to live with her after Dx w/ Dementia in [place] in 2020.  Getting night/day mixed up. Occasionally wakes up confused if it's day or night time.  Still recognizing family.  - Unclear if seen by neuro in the past or who made Dx - dtr tried to get in touch with MD in [place] but was unable  - Previously on Aricept - stopped d/t bradycardia  - Mood/behavior: stable mood, occasionally gets more confused at nighttime   - Motor Syx: ambulates with walker outside, walks down steps w/ cane, at home walks independently Fall in 2022 - hospitalized, collapsed - dtr states it was in [facility] shower and suspects water was too hot, heart rate was low and dementia medication Donepezil was stopped.  Fall in 7/23 - slid on floor in bathroom, no injury  - Sleep: deny problems   - Appetite: good   - Toileting: UI, wears depends, occasional constipation improves w/ prune juice   - MOCA: pending - LABS: reviewed in EMR - MRI Brain: no prior study to review available  - PET Brain FDG: no prior study to review available   Recently Dx w/ Appendiceal Goblet cell adenocarcinoma s/p resection - Dtr states patient is adamant about NOT having any further surgery but considering other treatment options  - Follows w/ Onc Dr. Ríos - seen 7/23 - visit note appreciated in EMR  [TextBox_25] : no dexa in chart  [PapSmeardate] : 7/23 [TextBox_31] : ENT Dr. Wong - EMR notes appreciated.  Picking up HAs on 8/21/23  [BoneDensityDate] : 5/23 [TextBox_37] : Amanda Fonseca - follows q4mo  [Grab Bars] : no grab bars [Driving Concerns] : not driving or driving without noted concerns [[facility]] : >12 [de-identified] : PHQ2 of 0 on 8/9/23  [[id number]] : 0 [AdvancecareDate] : 8/9/23 [FreeTextEntry4] : HCP form at home - dtr states she is primary HCP and granddaughter Izzy is alternate?  Dtr states patient expressed wishes not to pursue further surgery for Appendiceal cancer.  - f/u for further GOCD at next visit

## 2023-08-09 NOTE — ADDENDUM
[FreeTextEntry1] : Total time spent on the day of the encounter includes face-to-face and non face-to-face time personally spent by the physician preparing to see the patient, reviewing available medical records, obtaining collateral information from caregivers, performing a medically appropriate exam and evaluation, counseling, educating, talking to the family or caregivers, ordering medicines, ordering tests or procedures, reviewing and discussing test and imaging results, referring and communicating with other healthcare professionals, and documenting clinical information in the EHR.

## 2023-08-09 NOTE — ASSESSMENT
[FreeTextEntry1] : Available med records reviewed Would check folate, Lipids (fasting) with next BW  Urine studies ordered, r/o UTI  - Medications reviewed with CG and reconciled  Plan for further cognitive/mood/behavior assessment at next visit  - ADC program discussed and reading material about program provided - Referred to SW for additional counseling, education, support, resources  f/u once urine studies resulted, unless earlier PRN  Rest as per PMD - adv regular f/u visits as per PMD, last seen for initial in 5/23 - visit note appreciated in EMR

## 2023-08-09 NOTE — REASON FOR VISIT
[Initial Evaluation] : an initial evaluation [FreeTextEntry1] : dementia  [FreeTextEntry3] : dtr Rabia  [FreeTextEntry2] : who assists with history due to patient with baseline cognitive impairment

## 2023-08-15 ENCOUNTER — LABORATORY RESULT (OUTPATIENT)
Age: 83
End: 2023-08-15

## 2023-08-21 ENCOUNTER — APPOINTMENT (OUTPATIENT)
Dept: PHARMACY | Facility: CLINIC | Age: 83
End: 2023-08-21
Payer: SELF-PAY

## 2023-08-21 PROCEDURE — V5261J: CUSTOM

## 2023-08-21 NOTE — HISTORY OF PRESENT ILLNESS
[Ear Infections] : no ear infections [Imbalance or Dizziness] : no imbalance or dizziness [Ear, Nose or Throat Surgery] : no ear, nose or throat surgery [Difficulty hearing in group setting] : difficulty hearing in group setting [FreeTextEntry1] : 82 year old female arrived for hearing aid evaluation-- referred by Dr. Miller. Hx of dementia. 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 Pt dx with hypertension and high cholesterol. Patient recently moved to [place] 1 year ago- living with daughter. She was previously fit with binaural hearing aids in [place], however daughter is interested in obtaining new hearing aids as current do not fit properly.   Difficulties hearing include individual speakers, groups/noise, on the phone, and watching tv.   [FreeTextEntry8] : Patient seen today for fitting of new hearing aids. Patient's daughter reports Allyn was having a bad day- she feels dementia has progressed.

## 2023-08-21 NOTE — ASSESSMENT
[FreeTextEntry1] : Ears fit: AU Device: Oticon Zircon 1 MR-R R SN:[id number] SN:[id number] repair/replacement warranty: 9/2/26 domes: 8mm closed AU receivers: 2(85) Trial period: 10/5/23  Put most recent audiogram in DEEDEE and programmed accordingly. Set to adaptation level 2 as per patient's preference. Deactivated volume toggle. Patient inconsistently responded during today's appointment.  Discussed use, care, and maintenance of hearing aids. Reviewed replacement WG's and domes. Reviewed insertion and removal of hearing aids with daughter, however during the discussion patient wanted to lie down- appointment discontinued.   Reviewed 45-day trial period, warranties, and financials- provided copy of purchase agreement. Discussed pairing hearing aids to her iPhone to be able to control the volume- will pair at f/u appointment.

## 2023-08-22 LAB
APPEARANCE: CLEAR
BILIRUBIN URINE: NEGATIVE
BLOOD URINE: NEGATIVE
COLOR: YELLOW
GLUCOSE QUALITATIVE U: NEGATIVE MG/DL
KETONES URINE: NEGATIVE MG/DL
LEUKOCYTE ESTERASE URINE: ABNORMAL
NITRITE URINE: POSITIVE
PH URINE: 6
PROTEIN URINE: NORMAL MG/DL
SPECIFIC GRAVITY URINE: 1.02
UROBILINOGEN URINE: 0.2 MG/DL

## 2023-08-24 RX ORDER — CIPROFLOXACIN HYDROCHLORIDE 500 MG/1
500 TABLET, FILM COATED ORAL
Qty: 10 | Refills: 0 | Status: DISCONTINUED | COMMUNITY
Start: 2023-08-22 | End: 2023-08-24

## 2023-09-01 ENCOUNTER — NON-APPOINTMENT (OUTPATIENT)
Age: 83
End: 2023-09-01

## 2023-09-06 ENCOUNTER — APPOINTMENT (OUTPATIENT)
Dept: GASTROENTEROLOGY | Facility: CLINIC | Age: 83
End: 2023-09-06
Payer: MEDICARE

## 2023-09-06 VITALS — HEIGHT: 63 IN | BODY MASS INDEX: 26.05 KG/M2 | WEIGHT: 147 LBS

## 2023-09-06 VITALS
DIASTOLIC BLOOD PRESSURE: 79 MMHG | HEART RATE: 59 BPM | TEMPERATURE: 96.8 F | HEIGHT: 63 IN | OXYGEN SATURATION: 98 % | WEIGHT: 147 LBS | BODY MASS INDEX: 26.05 KG/M2 | SYSTOLIC BLOOD PRESSURE: 142 MMHG

## 2023-09-06 DIAGNOSIS — K83.1 OBSTRUCTION OF BILE DUCT: ICD-10-CM

## 2023-09-06 PROCEDURE — 99214 OFFICE O/P EST MOD 30 MIN: CPT

## 2023-09-06 NOTE — HISTORY OF PRESENT ILLNESS
[FreeTextEntry1] : 83 year old female known to me for management of a distal CBD stricture. She underwent initial stent placement in Oct 2022, followed by stent removal and dilation in March 2023. Pathology (biopsies and brushings) have all been normal. Patient here for follow up. Patient doing OK from a bile duct point of view. no RUQ pain. She had a MRI in July that showed the bile duct was normal. No jaundice.   Interim she was found to have appendiceal cancer. The patient has elected to not pursue surgery.

## 2023-09-06 NOTE — ASSESSMENT
[FreeTextEntry1] : 83 year old female known to me for a CBD stricture s/p endoscopic therapy. Patient is doing well with a stable CBD on MRI. I will check LFTs today.

## 2023-09-08 ENCOUNTER — APPOINTMENT (OUTPATIENT)
Dept: GERIATRICS | Facility: CLINIC | Age: 83
End: 2023-09-08
Payer: MEDICARE

## 2023-09-08 VITALS
TEMPERATURE: 97.9 F | HEART RATE: 59 BPM | RESPIRATION RATE: 14 BRPM | WEIGHT: 145 LBS | SYSTOLIC BLOOD PRESSURE: 155 MMHG | BODY MASS INDEX: 25.69 KG/M2 | OXYGEN SATURATION: 99 % | DIASTOLIC BLOOD PRESSURE: 88 MMHG

## 2023-09-08 DIAGNOSIS — R32 UNSPECIFIED URINARY INCONTINENCE: ICD-10-CM

## 2023-09-08 DIAGNOSIS — E78.5 HYPERLIPIDEMIA, UNSPECIFIED: ICD-10-CM

## 2023-09-08 DIAGNOSIS — H90.5 UNSPECIFIED SENSORINEURAL HEARING LOSS: ICD-10-CM

## 2023-09-08 DIAGNOSIS — Z74.1 NEED FOR ASSISTANCE WITH PERSONAL CARE: ICD-10-CM

## 2023-09-08 DIAGNOSIS — Z13.21 ENCOUNTER FOR SCREENING FOR NUTRITIONAL DISORDER: ICD-10-CM

## 2023-09-08 PROCEDURE — 99483 ASSMT & CARE PLN PT COG IMP: CPT

## 2023-09-13 ENCOUNTER — APPOINTMENT (OUTPATIENT)
Dept: PHARMACY | Facility: CLINIC | Age: 83
End: 2023-09-13
Payer: SELF-PAY

## 2023-09-13 PROCEDURE — V5299A: CUSTOM

## 2023-10-27 ENCOUNTER — APPOINTMENT (OUTPATIENT)
Dept: INTERNAL MEDICINE | Facility: CLINIC | Age: 83
End: 2023-10-27

## 2023-11-04 ENCOUNTER — LABORATORY RESULT (OUTPATIENT)
Age: 83
End: 2023-11-04

## 2023-11-06 LAB
ALBUMIN SERPL ELPH-MCNC: 4.2 G/DL
ALP BLD-CCNC: 92 U/L
ALT SERPL-CCNC: 12 U/L
ANION GAP SERPL CALC-SCNC: 8 MMOL/L
AST SERPL-CCNC: 22 U/L
BILIRUB SERPL-MCNC: 0.3 MG/DL
BUN SERPL-MCNC: 21 MG/DL
CALCIUM SERPL-MCNC: 11.7 MG/DL
CHLORIDE SERPL-SCNC: 104 MMOL/L
CO2 SERPL-SCNC: 28 MMOL/L
CREAT SERPL-MCNC: 1.04 MG/DL
EGFR: 53 ML/MIN/1.73M2
GLUCOSE SERPL-MCNC: 102 MG/DL
POTASSIUM SERPL-SCNC: 4.5 MMOL/L
PROT SERPL-MCNC: 7.3 G/DL
SODIUM SERPL-SCNC: 140 MMOL/L

## 2023-11-08 LAB
ALBUMIN SERPL ELPH-MCNC: 4 G/DL
ALP BLD-CCNC: 92 U/L
ALT SERPL-CCNC: 10 U/L
ANION GAP SERPL CALC-SCNC: 8 MMOL/L
APPEARANCE: ABNORMAL
AST SERPL-CCNC: 21 U/L
BASOPHILS # BLD AUTO: 0.02 K/UL
BASOPHILS NFR BLD AUTO: 0.4 %
BILIRUB SERPL-MCNC: 0.3 MG/DL
BILIRUBIN URINE: NEGATIVE
BLOOD URINE: ABNORMAL
BUN SERPL-MCNC: 22 MG/DL
CALCIUM SERPL-MCNC: 11.8 MG/DL
CHLORIDE SERPL-SCNC: 105 MMOL/L
CHOLEST SERPL-MCNC: 290 MG/DL
CO2 SERPL-SCNC: 28 MMOL/L
COLOR: YELLOW
CREAT SERPL-MCNC: 1.09 MG/DL
EGFR: 50 ML/MIN/1.73M2
EOSINOPHIL # BLD AUTO: 0.11 K/UL
EOSINOPHIL NFR BLD AUTO: 2.3 %
FOLATE SERPL-MCNC: 4 NG/ML
GLUCOSE QUALITATIVE U: NEGATIVE MG/DL
GLUCOSE SERPL-MCNC: 100 MG/DL
HCT VFR BLD CALC: 38.1 %
HDLC SERPL-MCNC: 61 MG/DL
HGB BLD-MCNC: 11.7 G/DL
IMM GRANULOCYTES NFR BLD AUTO: 0 %
KETONES URINE: NEGATIVE MG/DL
LDLC SERPL CALC-MCNC: 217 MG/DL
LEUKOCYTE ESTERASE URINE: ABNORMAL
LYMPHOCYTES # BLD AUTO: 1.56 K/UL
LYMPHOCYTES NFR BLD AUTO: 32.2 %
MAN DIFF?: NORMAL
MCHC RBC-ENTMCNC: 28.1 PG
MCHC RBC-ENTMCNC: 30.7 GM/DL
MCV RBC AUTO: 91.6 FL
MONOCYTES # BLD AUTO: 0.5 K/UL
MONOCYTES NFR BLD AUTO: 10.3 %
NEUTROPHILS # BLD AUTO: 2.66 K/UL
NEUTROPHILS NFR BLD AUTO: 54.8 %
NITRITE URINE: NEGATIVE
NONHDLC SERPL-MCNC: 229 MG/DL
PH URINE: 5.5
PLATELET # BLD AUTO: 243 K/UL
POTASSIUM SERPL-SCNC: 4.8 MMOL/L
PROT SERPL-MCNC: 7.3 G/DL
PROTEIN URINE: NORMAL MG/DL
RBC # BLD: 4.16 M/UL
RBC # FLD: 13.8 %
SODIUM SERPL-SCNC: 140 MMOL/L
SPECIFIC GRAVITY URINE: 1.01
TRIGL SERPL-MCNC: 80 MG/DL
UROBILINOGEN URINE: 0.2 MG/DL
VIT B12 SERPL-MCNC: 625 PG/ML
WBC # FLD AUTO: 4.85 K/UL

## 2023-11-08 RX ORDER — L.ACIDOPH/L.BULG/B.BIF/S.THERM 1B-250 MG
TABLET ORAL
Qty: 40 | Refills: 0 | Status: ACTIVE | COMMUNITY
Start: 2023-08-22 | End: 1900-01-01

## 2023-11-10 ENCOUNTER — APPOINTMENT (OUTPATIENT)
Dept: PHARMACY | Facility: CLINIC | Age: 83
End: 2023-11-10

## 2023-11-14 ENCOUNTER — APPOINTMENT (OUTPATIENT)
Dept: GERIATRICS | Facility: CLINIC | Age: 83
End: 2023-11-14

## 2023-11-16 ENCOUNTER — APPOINTMENT (OUTPATIENT)
Dept: GERIATRICS | Facility: CLINIC | Age: 83
End: 2023-11-16
Payer: MEDICARE

## 2023-11-16 VITALS
RESPIRATION RATE: 14 BRPM | DIASTOLIC BLOOD PRESSURE: 91 MMHG | SYSTOLIC BLOOD PRESSURE: 154 MMHG | BODY MASS INDEX: 24.98 KG/M2 | HEART RATE: 67 BPM | WEIGHT: 141 LBS | OXYGEN SATURATION: 98 % | TEMPERATURE: 97.1 F

## 2023-11-16 PROCEDURE — 99497 ADVNCD CARE PLAN 30 MIN: CPT

## 2023-11-16 PROCEDURE — G2212 PROLONG OUTPT/OFFICE VIS: CPT

## 2023-11-16 PROCEDURE — 99215 OFFICE O/P EST HI 40 MIN: CPT

## 2023-11-21 ENCOUNTER — NON-APPOINTMENT (OUTPATIENT)
Age: 83
End: 2023-11-21

## 2023-11-24 RX ORDER — GLUCOSAMINE HCL/CHONDROITIN SU 500-400 MG
3 CAPSULE ORAL
Qty: 30 | Refills: 3 | Status: ACTIVE | COMMUNITY
Start: 2023-11-24 | End: 1900-01-01

## 2023-12-03 ENCOUNTER — NON-APPOINTMENT (OUTPATIENT)
Age: 83
End: 2023-12-03

## 2024-01-02 ENCOUNTER — NON-APPOINTMENT (OUTPATIENT)
Age: 84
End: 2024-01-02

## 2024-01-17 ENCOUNTER — NON-APPOINTMENT (OUTPATIENT)
Age: 84
End: 2024-01-17

## 2024-02-01 ENCOUNTER — APPOINTMENT (OUTPATIENT)
Dept: GERIATRICS | Facility: CLINIC | Age: 84
End: 2024-02-01
Payer: MEDICARE

## 2024-02-01 VITALS
TEMPERATURE: 97.2 F | HEIGHT: 63 IN | BODY MASS INDEX: 25.69 KG/M2 | SYSTOLIC BLOOD PRESSURE: 144 MMHG | DIASTOLIC BLOOD PRESSURE: 75 MMHG | HEART RATE: 68 BPM | OXYGEN SATURATION: 99 % | WEIGHT: 145 LBS

## 2024-02-01 DIAGNOSIS — R30.0 DYSURIA: ICD-10-CM

## 2024-02-01 DIAGNOSIS — Z78.9 OTHER REDUCED MOBILITY: ICD-10-CM

## 2024-02-01 DIAGNOSIS — N94.9 UNSPECIFIED CONDITION ASSOCIATED WITH FEMALE GENITAL ORGANS AND MENSTRUAL CYCLE: ICD-10-CM

## 2024-02-01 DIAGNOSIS — N30.00 ACUTE CYSTITIS W/OUT HEMATURIA: ICD-10-CM

## 2024-02-01 DIAGNOSIS — Z74.09 OTHER REDUCED MOBILITY: ICD-10-CM

## 2024-02-01 PROCEDURE — 99214 OFFICE O/P EST MOD 30 MIN: CPT

## 2024-02-02 LAB
APPEARANCE: CLEAR
BILIRUBIN URINE: NEGATIVE
BLOOD URINE: NEGATIVE
COLOR: YELLOW
GLUCOSE QUALITATIVE U: NEGATIVE MG/DL
KETONES URINE: NEGATIVE MG/DL
LEUKOCYTE ESTERASE URINE: NEGATIVE
NITRITE URINE: NEGATIVE
PH URINE: 6
PROTEIN URINE: NEGATIVE MG/DL
SPECIFIC GRAVITY URINE: 1.01
UROBILINOGEN URINE: 0.2 MG/DL

## 2024-02-07 PROBLEM — N30.00 ACUTE CYSTITIS WITHOUT HEMATURIA: Status: RESOLVED | Noted: 2023-08-22 | Resolved: 2024-02-07

## 2024-02-07 PROBLEM — Z74.09 IMPAIRED MOBILITY AND ADLS: Status: ACTIVE | Noted: 2023-08-09

## 2024-02-07 RX ORDER — NITROFURANTOIN (MONOHYDRATE/MACROCRYSTALS) 25; 75 MG/1; MG/1
100 CAPSULE ORAL TWICE DAILY
Qty: 14 | Refills: 0 | Status: DISCONTINUED | COMMUNITY
Start: 2023-08-24 | End: 2024-02-07

## 2024-02-07 RX ORDER — POLYETHYLENE GLYCOL 3350 AND ELECTROLYTES WITH LEMON FLAVOR 236; 22.74; 6.74; 5.86; 2.97 G/4L; G/4L; G/4L; G/4L; G/4L
236 POWDER, FOR SOLUTION ORAL
Qty: 1 | Refills: 0 | Status: DISCONTINUED | COMMUNITY
Start: 2023-06-23 | End: 2024-02-07

## 2024-02-07 NOTE — ASSESSMENT
[FreeTextEntry1] : No evidence of infection or rash on external vaginal exam. Pt currently asymptomatic.  Doubt UTI but will check urine, r/o UTI, in setting of poor historian d/t dementia Consider trial topical estrogen cream if recurrent bothersome symptoms  c/w prune juice for intermittent constipation Miralax PRN if above not effective until BM achieved  c/w assistance for personal care, including hygiene, toileting , skin care  Fall precautions  Adv regular f/u with PMD, and ALEJANDRO Ramirez for ADC program   Rest as per PMD  f/u with me PRN

## 2024-02-07 NOTE — HISTORY OF PRESENT ILLNESS
[Two or more falls in past year] : Patient reported two or more falls in the past year [Patient is independent with] : bathing [Independent] : transferring/mobility [Completely Dependent] : Completely dependent. [Full assistance needed] : Assistance needed managing medications [FAST Score: ____] : Functional Assessment Scale (FAST) Score: [unfilled] [Cane] : cane [Walker] : walker [Smoke Detector] : smoke detector [Carbon Monoxide Detector] : carbon monoxide detector [Shower Chair] : shower chair [Night Light] : night light [Wears Seat Belt] : wears seat belt [Moderate] : Stage: Moderate [Stable] : Status: Stable [Memory Lapses Or Loss] : stable memory impairment [Patient Observed To Be Agitated] : denies agitation [Hostility Toward Caregivers] : denies aggression [Sleep Disturbances] : denies sleep disturbances [] : denies wandering [Fixed Beliefs Contradicted By Reality (Delusions)] : denies delusions [Difficulty Finding Desired Words] : stable difficulty finding desired words [FreeTextEntry1] : PMD Dr. Yamila Neri  TODAY Patient denies complaints, however limited historian d/t baseline memory loss.  Daughter assists with history.  Reports pt c/o burning with urination on/off approx. 1 week ago and sometimes of vaginal burning. Today pt denies these symptoms.   - Mood/behavior: stable mood, occasionally gets more confused at nighttime   - Motor Syx: ambulates with walker outside, walks down steps w/ cane, at home walks independently Fall in 2022 - hospitalized, collapsed - dtr states it was in [facility] shower and suspects water was too hot, heart rate was low and dementia medication Donepezil was stopped.  Fall in 7/23 - slid on floor in bathroom, no injury  - Sleep: deny problems   - Appetite: good   - Toileting: chronic UI, wears depends, occasional constipation improves w/ prune juice   DEMENTIA - 8/23: Dtrs states pt moved to [place] in 5/22 to live with pt after Dx w/ Dementia in [place] in 2020.  Getting night/day mixed up. Occasionally wakes up confused if it's day or night time. Still recognizing family.  - Previously on Aricept - stopped d/t bradycardia  - MOCA: 4/30 on 9/8/23 - MRI Brain: no prior study to review available   Recently Dx w/ Appendiceal Goblet cell adenocarcinoma, now s/p resection - Dtr states patient is adamant about NOT having any further surgery but considering other treatment options  - Follows w/ Onc Dr. Ríos  [TextBox_25] : no dexa in chart  [PapSmeardate] : 7/23 [TextBox_31] : ENT Dr. Wong, +HAs [BoneDensityDate] : 5/23 [TextBox_37] : Amanda Fonseca - follows q4mo  [Grab Bars] : no grab bars [Driving Concerns] : not driving or driving without noted concerns [[facility]] : >12 [de-identified] : PHQ2 of 0 on 8/9/23  [CornellScale] : 7 on 9/8/23  [AdvancecareDate] : 9/8/23 [FreeTextEntry4] : HCP form reviewed: primary is dtr Rabia, no alternate documented  Dtr states patient expressed wishes not to pursue further surgery for Appendiceal cancer. GOJOE discussed, TESFAYE reviewed

## 2024-02-07 NOTE — PHYSICAL EXAM
[Sclera] : the sclera and conjunctiva were normal [Normal] : normal skin color and pigmentation [No Focal Deficits] : no focal deficits [Normal Outer Ear/Nose] : the ears and nose were normal in appearance [External Female Genitalia] : normal external genitalia [No Clubbing, Cyanosis] : no clubbing or cyanosis of the fingernails [Normal Hearing] : hearing was not normal [Normal Gait] : abnormal gait [Normal Insight/Judgment] : insight and judgment were not intact [de-identified] : examined with assistance of dtr/primary caregiver [de-identified] : slow, cautious unsteady gait with walker   [de-identified] : confused, unsteady gait  [de-identified] : calm, cooperative, difficult to follow instructions  [MocaTotal] : 4 [FreeTextEntry1] : 9/8/23

## 2024-02-07 NOTE — REVIEW OF SYSTEMS
[Loss Of Hearing] : hearing loss [Incontinence] : incontinence [As Noted in HPI] : as noted in HPI [Negative] : Heme/Lymph [FreeTextEntry9] : chronic occasional back pain

## 2024-02-07 NOTE — REASON FOR VISIT
[Follow-Up] : a follow-up visit [Family Member] : family member [FreeTextEntry1] : dementia  [FreeTextEntry3] : dtr Rabia  [FreeTextEntry2] : who assists with history due to patient with baseline cognitive impairment

## 2024-04-01 ENCOUNTER — NON-APPOINTMENT (OUTPATIENT)
Age: 84
End: 2024-04-01

## 2024-04-22 ENCOUNTER — APPOINTMENT (OUTPATIENT)
Dept: GERIATRICS | Facility: CLINIC | Age: 84
End: 2024-04-22
Payer: MEDICARE

## 2024-04-22 VITALS
HEIGHT: 63 IN | SYSTOLIC BLOOD PRESSURE: 172 MMHG | BODY MASS INDEX: 26.07 KG/M2 | DIASTOLIC BLOOD PRESSURE: 77 MMHG | OXYGEN SATURATION: 99 % | WEIGHT: 147.13 LBS | TEMPERATURE: 97.6 F | HEART RATE: 61 BPM

## 2024-04-22 DIAGNOSIS — K59.00 CONSTIPATION, UNSPECIFIED: ICD-10-CM

## 2024-04-22 DIAGNOSIS — Z91.81 HISTORY OF FALLING: ICD-10-CM

## 2024-04-22 DIAGNOSIS — Z71.89 OTHER SPECIFIED COUNSELING: ICD-10-CM

## 2024-04-22 DIAGNOSIS — F99 INSOMNIA DUE TO OTHER MENTAL DISORDER: ICD-10-CM

## 2024-04-22 DIAGNOSIS — F03.918 UNSPECIFIED DEMENTIA, UNSPECIFIED SEVERITY, WITH OTHER BEHAVIORAL DISTURBANCE: ICD-10-CM

## 2024-04-22 DIAGNOSIS — F51.05 INSOMNIA DUE TO OTHER MENTAL DISORDER: ICD-10-CM

## 2024-04-22 DIAGNOSIS — Z63.6 DEPENDENT RELATIVE NEEDING CARE AT HOME: ICD-10-CM

## 2024-04-22 PROCEDURE — 99215 OFFICE O/P EST HI 40 MIN: CPT

## 2024-04-22 PROCEDURE — G2211 COMPLEX E/M VISIT ADD ON: CPT

## 2024-04-22 SDOH — SOCIAL STABILITY - SOCIAL INSECURITY: DEPENDENT RELATIVE NEEDING CARE AT HOME: Z63.6

## 2024-04-22 NOTE — CURRENT MEDS
Patient [Yes] : Reviewed medication list for presence of high-risk medications. [Patient/caregiver able to verbalize understanding of medications, including indications and side effects] : Patient/caregiver able to verbalize understanding of medications, including indications and side effects

## 2024-04-24 PROBLEM — F03.918 DEMENTIA WITH BEHAVIORAL DISTURBANCE: Status: ACTIVE | Noted: 2023-02-02

## 2024-04-24 PROBLEM — Z71.89 ADVANCE CARE PLANNING: Status: ACTIVE | Noted: 2023-08-09

## 2024-04-24 PROBLEM — F51.05 INSOMNIA DUE TO OTHER MENTAL DISORDER: Status: ACTIVE | Noted: 2023-11-24

## 2024-04-24 PROBLEM — Z63.6 CAREGIVER STRESS: Status: ACTIVE | Noted: 2023-11-24

## 2024-04-24 PROBLEM — Z91.81 AT RISK FOR FALLS: Status: ACTIVE | Noted: 2023-04-25

## 2024-04-24 PROBLEM — K59.00 CONSTIPATION, UNSPECIFIED CONSTIPATION TYPE: Status: ACTIVE | Noted: 2023-08-09

## 2024-04-24 RX ORDER — LATANOPROST/PF 0.005 %
0.01 DROPS OPHTHALMIC (EYE)
Qty: 8 | Refills: 0 | Status: ACTIVE | COMMUNITY
Start: 2024-01-27

## 2024-04-24 NOTE — HISTORY OF PRESENT ILLNESS
[One fall no injury in past year] : Patient reported one fall in the past year without injury [Full assistance needed] : Assistance needed managing medications [FAST Score: ____] : Functional Assessment Scale (FAST) Score: [unfilled] [Cane] : cane [Wheelchair] : wheelchair [Shower Chair] : shower chair [I have developed a follow-up plan documented below in the note.] : I have developed a follow-up plan documented below in the note. [] : 1 [Moderate] : Stage: Moderate [Worse] : Status: Worse [Reviewed updated] : Reviewed, updated [Designated Healthcare Proxy] : Designated healthcare proxy [Name: ___] : Health Care Proxy's Name: [unfilled]  [I will adhere to the patient's wishes.] : I will adhere to the patient's wishes. [Time Spent: ___ minutes] : Time Spent: [unfilled] minutes [FreeTextEntry1] : Rabia helps her with turning on the water, and helps with to shower her  [FreeTextEntry2] : Rabia  [FreeTextEntry8] : UI  [de-identified] : 1 [de-identified] : 0 [de-identified] : rollator  [CornellScale] : 6 11/24/23 [FreeTextEntry] : 3 [de-identified] : 1 [NPI-QTotalScore] : 4 [de-identified] : [facility] Caregiver [place] Assessment: 30 [AdvancecareDate] : 4/24/24 [FreeTextEntry4] :  Advance Directives: HCP/Advance Directives/Living will: Rabia Melendez,daughter  HCP/Alternate Decision Maker: TESFAYE:sheila CARLIN  What matters most? pt. stated "my family"

## 2024-04-24 NOTE — SOCIAL HISTORY
[With family] : lives with family [FreeTextEntry1] : Rabia  [FreeTextEntry4] : daughter  [CaregiverPHQ-9Score] : 0 [CaregiverMSCIScore] : 17 [CaregiverDBSScore] : 18

## 2024-04-24 NOTE — PHYSICAL EXAM
[Alert] : alert [Well Nourished] : well nourished [Healthy Appearing] : healthy appearing [Well Developed] : well developed [Sclera] : the sclera and conjunctiva were normal [EOMI] : extraocular movements were intact [PERRL] : pupils were equal in size, round, and reactive to light [Normal Oral Mucosa] : normal oral mucosa [No Oral Pallor] : no oral pallor [No Oral Cyanosis] : no oral cyanosis [Both Tympanic Membranes Were Examined] : both tympanic membranes were normal [Supple] : the neck was supple [No Respiratory Distress] : no respiratory distress [No Acc Muscle Use] : no accessory muscle use [Respiration, Rhythm And Depth] : normal respiratory rhythm and effort [Auscultation Breath Sounds / Voice Sounds] : lungs were clear to auscultation bilaterally [Normal PMI] : the apical impulse was normal [Normal S1, S2] : normal S1 and S2 [Heart Rate And Rhythm] : heart rate was normal and rhythm regular [Edema] : edema was not present [Normal Appearance] : normal in appearance [No Nipple Discharge] : no nipple discharge [Bowel Sounds] : normal bowel sounds [Abdomen Tenderness] : non-tender [Abdomen Soft] : soft [Cervical Lymph Nodes Enlarged Posterior Bilaterally] : posterior cervical [Supraclavicular Lymph Nodes Enlarged Bilaterally] : supraclavicular [Cervical Lymph Nodes Enlarged Anterior Bilaterally] : anterior cervical, supraclavicular [Axillary Lymph Nodes Enlarged Bilaterally] : axillary [No CVA Tenderness] : no CVA  tenderness [No Spinal Tenderness] : no spinal tenderness [Normal Color / Pigmentation] : normal skin color and pigmentation [Normal Turgor] : normal skin turgor [No Focal Deficits] : no focal deficits [Normal Affect] : the affect was normal [Normal Mood] : the mood was normal [de-identified] : elderly female, Alert to self only [de-identified] : ataxic gait

## 2024-04-24 NOTE — DATA REVIEWED
[FreeTextEntry1] :  ACC: [id number] EXAM:  CT BRAIN                        PROCEDURE DATE:  10/04/2022      INTERPRETATION:  Noncontrast CT of the brain.  CLINICAL INDICATION:  Encephalopathy. Dementia.  TECHNIQUE : Axial CT scanning of the brain was obtained from the skull  base to the vertex without the administration of intravenous contrast.  Sagittal and coronal reformats were provided.  COMPARISON: None available  FINDINGS:  No hydrocephalus, mass effect, midline shift, acute intracranial  hemorrhage, or brain edema.  Nonspecific mild lucency in the bilateral cerebral white matter..  Visualized paranasal sinuses and mastoid air cells are clear.  IMPRESSION:  No hydrocephalus, acute intracranial hemorrhage, mass effect, or brain  edema.  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 Differential diagnosis  includes infectious, inflammatory, and post infectious/inflammatory  processes.

## 2024-04-24 NOTE — REVIEW OF SYSTEMS
[Feeling Poorly] : not feeling poorly [Feeling Tired] : not feeling tired [Sore Throat] : no sore throat [Chest Pain] : no chest pain [Cough] : no cough [SOB on Exertion] : no shortness of breath during exertion [Constipation] : no constipation

## 2024-04-24 NOTE — REASON FOR VISIT
[Follow-Up] : a follow-up visit [FreeTextEntry1] : was referred to the [facility]'s [facility]ementia [facility] by MD Hoyt for comanagement of dementia-related issues and coordination of dementia care.

## 2024-04-24 NOTE — ASSESSMENT
[FreeTextEntry1] : Social Care Plan 1. Respite:   -Provided hotline contact to Alzheimer's Association [phone]. -provided [facility] contact to assist with resources and caregiver support.  -Provided link to www. communityresourcefinder.org -provided [facility] agency  -contact to SYD Leija.   2. Caregiver Education: Sent e-mail to -  I wanted to share useful tools to help manage your family member's dementia behaviors. I have included caregiver training videos from [facility] Alzheimer's and Dementia Care Program to help guide you and caretakers, as well as a daily care plan from the Alzheimer's Association. Maintaining a schedule and a structured day helps patients with dementia. I am available if you need any further assistance or have any questions. See below: 1.Agitation & Anxiety:  https://[email or web address] 2. Paranoid Thoughts:  [email or web address] 3. Hallucinations: https://www.youtube.com/watch/viX22PLcK2J 4. Alzheimer's Association Daily Care Plan:  https://www.alz.org/help-support/caregiving/daily-care/daily-care-plan 5. Educated daughter when behaviors occur to acknowledge patient's emotions and redirect behavior with distractions, address physical needs. Provided examples..   6.Educated daughter when agitation/hallucinations/ paranoid behaviors occur to acknowledge patient's emotions and redirect behavior with distractions, remain calm.  7. Provided Alzheimer's website. Educated daughter about support groups in [place], including the mild/moderate/severe caregiver support groups.  Look through books, picture albums, coloring books, painting, puzzles, play with ball/balloon, listen to music, stress ball, fold small items, planting.   3. Alzheimer's Association Community Resource Finder     www.alz.org/nca/helping you/community-resource-finder    4. Caregiver support: -SW contact  5. Safety:  -pt. is accompanied 24/7.. Counseled daughter that pt. is a high fall risk. Continue will fall safety precautions.  -medical alert bracelet -cameras  6. Caregiver depression: -Explained to daughter PHQ-9 score was neg for depression. Advised to discuss this score with PCP. Counseled on non-pharmacological interventions for stress which include meditation yumiko (Calm), daily physical activity.     7. [facility] education folder attached PFD.  8. Alz Association Support Groups PDF  9. Home Safety Evaluation: -Reocar  10. Brain HQ exercises   11. MIND diet   -[facility] edmund GREEN, f/u in 4 months.     Sonia Ramirez, TALIAP-BC

## 2024-07-15 ENCOUNTER — NON-APPOINTMENT (OUTPATIENT)
Age: 84
End: 2024-07-15

## 2024-08-06 ENCOUNTER — APPOINTMENT (OUTPATIENT)
Dept: GERIATRICS | Facility: CLINIC | Age: 84
End: 2024-08-06

## 2024-08-06 PROBLEM — E55.9 VITAMIN D DEFICIENCY: Status: ACTIVE | Noted: 2024-08-06

## 2024-08-06 PROCEDURE — 99214 OFFICE O/P EST MOD 30 MIN: CPT

## 2024-08-06 PROCEDURE — G2211 COMPLEX E/M VISIT ADD ON: CPT

## 2024-08-06 NOTE — PHYSICAL EXAM
[Sclera] : the sclera and conjunctiva were normal [Normal Outer Ear/Nose] : the ears and nose were normal in appearance [External Female Genitalia] : normal external genitalia [No Clubbing, Cyanosis] : no clubbing or cyanosis of the fingernails [Normal] : normal skin color and pigmentation [No Focal Deficits] : no focal deficits [Normal Hearing] : hearing was not normal [Normal Gait] : abnormal gait [Normal Insight/Judgment] : insight and judgment were not intact [de-identified] : examined with assistance of dtr/primary caregiver [de-identified] : slow, cautious unsteady gait with walker   [de-identified] : confused, unsteady gait  [de-identified] : calm, cooperative, difficult to follow instructions

## 2024-08-06 NOTE — REASON FOR VISIT
[Acute] : an acute visit [Family Member] : family member [FreeTextEntry1] : Abdominal pain [FreeTextEntry3] : Daughter Elisabeth

## 2024-08-06 NOTE — ASSESSMENT
[FreeTextEntry1] : Abdominal pain likely related to constipation.  Difficult to evaluate due to limited historian at baseline.  Hemodynamically stable and not ill-appearing.  Discussed management of constipation including adequate fluid intake, walking/activity.   Discussed bowel regimen including prunes prune juice.  Can try bedtime dose of senna 1 to 2 tablets in addition to above.  If constipated more than can also take MiraLAX once to twice daily until bowel movement achieved.   Blood pressure within normal limits today -continue with losartan 25 mg daily  Repeat labs ordered including to check for folate level on previous blood work  f/u with ALEJANDRO Ramirez for ADC program later this month.  - c/w 24/7 assistance for safety and help w/ ADLs fall precautions  f/u for Annual wellness visit in October 2024, unless earlier PRN

## 2024-08-06 NOTE — HISTORY OF PRESENT ILLNESS
[FreeTextEntry1] : Today presents with intermittent abdominal pain associated with constipation improves after bowel movement.  Has bowel movement almost every day per daughter, with prunes/prune juice every other day.  Today denies abdominal pain. Denies other symptoms including fever chills urinary changes, nausea, vomiting, diarrhea, no recent change in medications other complaints.   No recent change in medications, falls, mental status changes.

## 2024-08-27 ENCOUNTER — APPOINTMENT (OUTPATIENT)
Dept: GERIATRICS | Facility: CLINIC | Age: 84
End: 2024-08-27
Payer: MEDICARE

## 2024-08-27 VITALS
HEART RATE: 71 BPM | WEIGHT: 148.38 LBS | BODY MASS INDEX: 26.29 KG/M2 | HEIGHT: 63 IN | TEMPERATURE: 97.4 F | OXYGEN SATURATION: 97 % | DIASTOLIC BLOOD PRESSURE: 78 MMHG | SYSTOLIC BLOOD PRESSURE: 124 MMHG | RESPIRATION RATE: 16 BRPM

## 2024-08-27 DIAGNOSIS — I10 ESSENTIAL (PRIMARY) HYPERTENSION: ICD-10-CM

## 2024-08-27 DIAGNOSIS — M25.562 PAIN IN LEFT KNEE: ICD-10-CM

## 2024-08-27 DIAGNOSIS — E53.8 DEFICIENCY OF OTHER SPECIFIED B GROUP VITAMINS: ICD-10-CM

## 2024-08-27 DIAGNOSIS — R10.9 UNSPECIFIED ABDOMINAL PAIN: ICD-10-CM

## 2024-08-27 DIAGNOSIS — E21.3 HYPERPARATHYROIDISM, UNSPECIFIED: ICD-10-CM

## 2024-08-27 DIAGNOSIS — F03.918 UNSPECIFIED DEMENTIA, UNSPECIFIED SEVERITY, WITH OTHER BEHAVIORAL DISTURBANCE: ICD-10-CM

## 2024-08-27 DIAGNOSIS — E78.5 HYPERLIPIDEMIA, UNSPECIFIED: ICD-10-CM

## 2024-08-27 DIAGNOSIS — L98.9 DISORDER OF THE SKIN AND SUBCUTANEOUS TISSUE, UNSPECIFIED: ICD-10-CM

## 2024-08-27 PROCEDURE — 99215 OFFICE O/P EST HI 40 MIN: CPT

## 2024-08-27 RX ORDER — ACETAMINOPHEN 500 MG/1
500 TABLET ORAL DAILY
Qty: 60 | Refills: 0 | Status: ACTIVE | COMMUNITY
Start: 2024-08-27 | End: 1900-01-01

## 2024-08-27 RX ORDER — UBIDECARENONE 200 MG
400 CAPSULE ORAL DAILY
Qty: 90 | Refills: 0 | Status: ACTIVE | COMMUNITY
Start: 2024-08-27 | End: 1900-01-01

## 2024-08-27 NOTE — DATA REVIEWED
[FreeTextEntry1] :  ACC: [id number] EXAM:  CT BRAIN                        PROCEDURE DATE:  10/04/2022      INTERPRETATION:  Noncontrast CT of the brain.  CLINICAL INDICATION:  Encephalopathy. Dementia.  TECHNIQUE : Axial CT scanning of the brain was obtained from the skull  base to the vertex without the administration of intravenous contrast.  Sagittal and coronal reformats were provided.  COMPARISON: None available  FINDINGS:  No hydrocephalus, mass effect, midline shift, acute intracranial  hemorrhage, or brain edema.  Nonspecific mild lucency in the bilateral cerebral white matter..  Visualized paranasal sinuses and mastoid air cells are clear.  IMPRESSION:  No hydrocephalus, acute intracranial hemorrhage, mass effect, or brain  edema.  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 Differential diagnosis  includes infectious, inflammatory, and post infectious/inflammatory  processes.

## 2024-08-27 NOTE — HISTORY OF PRESENT ILLNESS
[Full assistance needed] : Assistance needed managing medications [FAST Score: ____] : Functional Assessment Scale (FAST) Score: [unfilled] [Cane] : cane [Wheelchair] : wheelchair [Shower Chair] : shower chair [I have developed a follow-up plan documented below in the note.] : I have developed a follow-up plan documented below in the note. [Moderate] : Stage: Moderate [Reviewed updated] : Reviewed, updated [Designated Healthcare Proxy] : Designated healthcare proxy [Name: ___] : Health Care Proxy's Name: [unfilled]  [I will adhere to the patient's wishes.] : I will adhere to the patient's wishes. [No falls in past year] : Patient reported no falls in the past year [NO] : No [] : 0 [Stable] : Status: Stable [FreeTextEntry1] : Rabia helps her with turning on the water, and helps with to shower her  [FreeTextEntry2] : Rabia  [FreeTextEntry8] : UI  [de-identified] : 1 [de-identified] : 0 [de-identified] : rollator  [CornellScale] : 4 on 08/27/24 [FreeTextEntry] : 3 [de-identified] : 1 [NPI-QTotalScore] : 4 [de-identified] : [facility] Caregiver [place] Assessment: 30 [AdvancecareDate] : 4/24/24 [FreeTextEntry4] :  Advance Directives: HCP/Advance Directives/Living will: Rabia Melendez,daughter  HCP/Alternate Decision Maker: TESFAYE:sheila CARLIN  What matters most? pt. stated "my family"

## 2024-08-27 NOTE — REVIEW OF SYSTEMS
[As Noted in HPI] : as noted in HPI [Feeling Poorly] : not feeling poorly [Feeling Tired] : not feeling tired [Sore Throat] : no sore throat [Chest Pain] : no chest pain [Cough] : no cough [SOB on Exertion] : no shortness of breath during exertion [Constipation] : no constipation

## 2024-08-27 NOTE — ASSESSMENT
[FreeTextEntry1] : 1) Abdominal pain -  chronic abd pain, no tenderness. hx of gi cancer. getting repeat ct scan of the abdomen. Follow up with GI.   2) Hyperparathyroidism with hypercalcemia - checking ultrasound of the thyroid. depending on result will advise to schedule with head and neck specialist vs endocrinologist.   3) Dementia   -likely Alzheimer's Dementia at moderate/severe stage     -insomnia behaviors worst, but manageable.     -c/w melatonin 3mg QHS     -FAST stage 6D - no sacral ulcer. has a small lesion in buttocks, hard. possible kyloid. dermatology referral.   4) Knee pain - no fall or trauma. xray ordered to further assess. acetaminophen 1gram in the morning daily.   -ADC acuity GREEN, f/u in 4 months.

## 2024-08-27 NOTE — PHYSICAL EXAM
[Alert] : alert [Healthy Appearing] : healthy appearing [Sclera] : the sclera and conjunctiva were normal [Normal Oral Mucosa] : normal oral mucosa [No Oral Cyanosis] : no oral cyanosis [Supple] : the neck was supple [No Respiratory Distress] : no respiratory distress [Respiration, Rhythm And Depth] : normal respiratory rhythm and effort [Auscultation Breath Sounds / Voice Sounds] : lungs were clear to auscultation bilaterally [Normal S1, S2] : normal S1 and S2 [Heart Rate And Rhythm] : heart rate was normal and rhythm regular [Edema] : edema was not present [Normal Appearance] : normal in appearance [Bowel Sounds] : normal bowel sounds [Abdomen Tenderness] : non-tender [Abdomen Soft] : soft [No Focal Deficits] : no focal deficits [Normal Affect] : the affect was normal [Normal Mood] : the mood was normal [de-identified] : elderly female, Alert to self only [de-identified] : ataxic gait

## 2024-10-08 ENCOUNTER — APPOINTMENT (OUTPATIENT)
Dept: GERIATRICS | Facility: CLINIC | Age: 84
End: 2024-10-08

## 2024-10-25 ENCOUNTER — OUTPATIENT (OUTPATIENT)
Dept: OUTPATIENT SERVICES | Facility: HOSPITAL | Age: 84
LOS: 1 days | End: 2024-10-25
Payer: MEDICARE

## 2024-10-25 ENCOUNTER — APPOINTMENT (OUTPATIENT)
Dept: ULTRASOUND IMAGING | Facility: IMAGING CENTER | Age: 84
End: 2024-10-25
Payer: MEDICARE

## 2024-10-25 ENCOUNTER — APPOINTMENT (OUTPATIENT)
Dept: RADIOLOGY | Facility: IMAGING CENTER | Age: 84
End: 2024-10-25
Payer: MEDICARE

## 2024-10-25 ENCOUNTER — APPOINTMENT (OUTPATIENT)
Dept: CT IMAGING | Facility: IMAGING CENTER | Age: 84
End: 2024-10-25
Payer: MEDICARE

## 2024-10-25 DIAGNOSIS — Z90.5 ACQUIRED ABSENCE OF KIDNEY: Chronic | ICD-10-CM

## 2024-10-25 DIAGNOSIS — E21.3 HYPERPARATHYROIDISM, UNSPECIFIED: ICD-10-CM

## 2024-10-25 DIAGNOSIS — Z98.890 OTHER SPECIFIED POSTPROCEDURAL STATES: Chronic | ICD-10-CM

## 2024-10-25 PROCEDURE — 76536 US EXAM OF HEAD AND NECK: CPT | Mod: 26

## 2024-10-25 PROCEDURE — 73562 X-RAY EXAM OF KNEE 3: CPT | Mod: 26,LT

## 2024-10-25 PROCEDURE — 74177 CT ABD & PELVIS W/CONTRAST: CPT | Mod: 26,MH

## 2024-10-28 ENCOUNTER — NON-APPOINTMENT (OUTPATIENT)
Age: 84
End: 2024-10-28

## 2024-11-03 PROBLEM — Z85.038: Status: RESOLVED | Noted: 2023-06-13 | Resolved: 2024-11-03

## 2024-11-03 PROBLEM — D35.1 PARATHYROID ADENOMA: Status: ACTIVE | Noted: 2024-11-03

## 2024-11-20 PROCEDURE — 73562 X-RAY EXAM OF KNEE 3: CPT

## 2024-11-20 PROCEDURE — 74177 CT ABD & PELVIS W/CONTRAST: CPT

## 2024-11-20 PROCEDURE — 76536 US EXAM OF HEAD AND NECK: CPT

## 2024-12-31 ENCOUNTER — NON-APPOINTMENT (OUTPATIENT)
Age: 84
End: 2024-12-31

## 2024-12-31 ENCOUNTER — APPOINTMENT (OUTPATIENT)
Dept: GERIATRICS | Facility: CLINIC | Age: 84
End: 2024-12-31
Payer: MEDICARE

## 2024-12-31 VITALS
HEART RATE: 71 BPM | DIASTOLIC BLOOD PRESSURE: 76 MMHG | HEIGHT: 63 IN | SYSTOLIC BLOOD PRESSURE: 142 MMHG | BODY MASS INDEX: 27.29 KG/M2 | RESPIRATION RATE: 16 BRPM | TEMPERATURE: 98.3 F | OXYGEN SATURATION: 99 % | WEIGHT: 154 LBS

## 2024-12-31 DIAGNOSIS — Z13.820 ENCOUNTER FOR SCREENING FOR OSTEOPOROSIS: ICD-10-CM

## 2024-12-31 DIAGNOSIS — Z23 ENCOUNTER FOR IMMUNIZATION: ICD-10-CM

## 2024-12-31 DIAGNOSIS — I10 ESSENTIAL (PRIMARY) HYPERTENSION: ICD-10-CM

## 2024-12-31 DIAGNOSIS — D35.1 BENIGN NEOPLASM OF PARATHYROID GLAND: ICD-10-CM

## 2024-12-31 DIAGNOSIS — E55.9 VITAMIN D DEFICIENCY, UNSPECIFIED: ICD-10-CM

## 2024-12-31 DIAGNOSIS — Z13.29 ENCOUNTER FOR SCREENING FOR OTHER SUSPECTED ENDOCRINE DISORDER: ICD-10-CM

## 2024-12-31 DIAGNOSIS — Z71.89 OTHER SPECIFIED COUNSELING: ICD-10-CM

## 2024-12-31 DIAGNOSIS — E83.52 HYPERCALCEMIA: ICD-10-CM

## 2024-12-31 DIAGNOSIS — N94.89 OTHER SPECIFIED CONDITIONS ASSOCIATED WITH FEMALE GENITAL ORGANS AND MENSTRUAL CYCLE: ICD-10-CM

## 2024-12-31 DIAGNOSIS — F03.918 UNSPECIFIED DEMENTIA, UNSPECIFIED SEVERITY, WITH OTHER BEHAVIORAL DISTURBANCE: ICD-10-CM

## 2024-12-31 DIAGNOSIS — E53.8 DEFICIENCY OF OTHER SPECIFIED B GROUP VITAMINS: ICD-10-CM

## 2024-12-31 DIAGNOSIS — Z13.21 ENCOUNTER FOR SCREENING FOR NUTRITIONAL DISORDER: ICD-10-CM

## 2024-12-31 DIAGNOSIS — Z00.00 ENCOUNTER FOR GENERAL ADULT MEDICAL EXAMINATION W/OUT ABNORMAL FINDINGS: ICD-10-CM

## 2024-12-31 PROCEDURE — G0008: CPT

## 2024-12-31 PROCEDURE — G0439: CPT

## 2024-12-31 PROCEDURE — 90662 IIV NO PRSV INCREASED AG IM: CPT

## 2025-01-08 LAB
25(OH)D3 SERPL-MCNC: 23.4 NG/ML
ALBUMIN SERPL ELPH-MCNC: 4.1 G/DL
ALP BLD-CCNC: 124 U/L
ALT SERPL-CCNC: 11 U/L
ANION GAP SERPL CALC-SCNC: 14 MMOL/L
AST SERPL-CCNC: 25 U/L
BASOPHILS # BLD AUTO: 0.02 K/UL
BASOPHILS NFR BLD AUTO: 0.4 %
BILIRUB SERPL-MCNC: 0.2 MG/DL
BUN SERPL-MCNC: 23 MG/DL
CALCIUM SERPL-MCNC: 11.4 MG/DL
CHLORIDE SERPL-SCNC: 103 MMOL/L
CHOLEST SERPL-MCNC: 222 MG/DL
CO2 SERPL-SCNC: 21 MMOL/L
CREAT SERPL-MCNC: 1.07 MG/DL
EGFR: 51 ML/MIN/1.73M2
EOSINOPHIL # BLD AUTO: 0.06 K/UL
EOSINOPHIL NFR BLD AUTO: 1.1 %
FOLATE SERPL-MCNC: 4.5 NG/ML
GLUCOSE SERPL-MCNC: 77 MG/DL
HCT VFR BLD CALC: 36.7 %
HDLC SERPL-MCNC: 67 MG/DL
HGB BLD-MCNC: 11.6 G/DL
IMM GRANULOCYTES NFR BLD AUTO: 0.2 %
LDLC SERPL CALC-MCNC: 139 MG/DL
LYMPHOCYTES # BLD AUTO: 1.62 K/UL
LYMPHOCYTES NFR BLD AUTO: 30.1 %
MAN DIFF?: NORMAL
MCHC RBC-ENTMCNC: 29.4 PG
MCHC RBC-ENTMCNC: 31.6 G/DL
MCV RBC AUTO: 93.1 FL
MONOCYTES # BLD AUTO: 0.53 K/UL
MONOCYTES NFR BLD AUTO: 9.9 %
NEUTROPHILS # BLD AUTO: 3.14 K/UL
NEUTROPHILS NFR BLD AUTO: 58.3 %
NONHDLC SERPL-MCNC: 154 MG/DL
PLATELET # BLD AUTO: 201 K/UL
POTASSIUM SERPL-SCNC: 4.8 MMOL/L
PROT SERPL-MCNC: 7.6 G/DL
RBC # BLD: 3.94 M/UL
RBC # FLD: 12.7 %
SODIUM SERPL-SCNC: 138 MMOL/L
TRIGL SERPL-MCNC: 88 MG/DL
TSH SERPL-ACNC: 2.47 UIU/ML
VIT B12 SERPL-MCNC: 502 PG/ML
WBC # FLD AUTO: 5.38 K/UL

## 2025-01-08 RX ORDER — COLD-HOT PACK
125 MCG EACH MISCELLANEOUS
Qty: 90 | Refills: 3 | Status: ACTIVE | COMMUNITY
Start: 2025-01-08 | End: 1900-01-01

## 2025-06-05 ENCOUNTER — APPOINTMENT (OUTPATIENT)
Dept: OTOLARYNGOLOGY | Facility: CLINIC | Age: 85
End: 2025-06-05

## 2025-06-05 VITALS
BODY MASS INDEX: 27.29 KG/M2 | HEIGHT: 63 IN | DIASTOLIC BLOOD PRESSURE: 69 MMHG | HEART RATE: 56 BPM | WEIGHT: 154 LBS | SYSTOLIC BLOOD PRESSURE: 136 MMHG

## 2025-06-05 DIAGNOSIS — H61.23 IMPACTED CERUMEN, BILATERAL: ICD-10-CM

## 2025-06-05 PROCEDURE — 92557 COMPREHENSIVE HEARING TEST: CPT

## 2025-06-05 PROCEDURE — 99212 OFFICE O/P EST SF 10 MIN: CPT | Mod: 25

## 2025-06-05 PROCEDURE — 69210 REMOVE IMPACTED EAR WAX UNI: CPT

## 2025-06-05 PROCEDURE — 92567 TYMPANOMETRY: CPT

## 2025-06-13 ENCOUNTER — APPOINTMENT (OUTPATIENT)
Dept: PHARMACY | Facility: CLINIC | Age: 85
End: 2025-06-13
Payer: SELF-PAY

## 2025-06-13 PROCEDURE — V5299A: CUSTOM | Mod: NC

## 2025-07-18 ENCOUNTER — APPOINTMENT (OUTPATIENT)
Dept: PHARMACY | Facility: CLINIC | Age: 85
End: 2025-07-18
Payer: SELF-PAY

## 2025-07-18 PROCEDURE — V5299A: CUSTOM

## (undated) DEVICE — ELCTR ECG CONDUCTIVE ADHESIVE

## (undated) DEVICE — DENTURE CUP PINK

## (undated) DEVICE — ONCORE 26 INSUFLATION DEVICE

## (undated) DEVICE — LINE BREATHE SAMPLNG

## (undated) DEVICE — SALIVA EJECTOR (BLUE)

## (undated) DEVICE — SYR LUER LOK 10CC

## (undated) DEVICE — STAPLER COVIDIEN ENDO GIA STANDARD HANDLE

## (undated) DEVICE — CATH IV SAFE BC 22G X 1" (BLUE)

## (undated) DEVICE — SENSOR O2 FINGER ADULT

## (undated) DEVICE — ELCTR GROUNDING PAD ADULT COVIDIEN

## (undated) DEVICE — DRSG CURITY GAUZE SPONGE 4 X 4" 12-PLY NON-STERILE

## (undated) DEVICE — INJ SYS RAP REFIL

## (undated) DEVICE — FORCEP RADIAL JAW 4 PEDIATRIC W NDL 1.8MM 2MM 160CM DISP

## (undated) DEVICE — GOWN LG

## (undated) DEVICE — CONTAINER FORMALIN 10% 20ML

## (undated) DEVICE — CANISTER DISPOSABLE THIN WALL 3000CC

## (undated) DEVICE — DRSG BANDAID 0.75X3"

## (undated) DEVICE — SOL INJ NS 0.9% 500ML 1-PORT

## (undated) DEVICE — BASIN EMESIS 10IN GRADUATED MAUVE

## (undated) DEVICE — BITE BLOCK ADULT 20 X 27MM (GREEN)

## (undated) DEVICE — SUT VICRYL 0 27" UR-6

## (undated) DEVICE — TUBING STRYKEFLOW II SUCTION / IRRIGATOR

## (undated) DEVICE — PACK GENERAL LAPAROSCOPY

## (undated) DEVICE — OMNIPAQUE 300  30ML

## (undated) DEVICE — TROCAR COVIDIEN VERSAPORT BLADELESS OPTICAL 5MM STANDARD

## (undated) DEVICE — UNDERPAD LINEN SAVER 17 X 24"

## (undated) DEVICE — DRSG 2X2

## (undated) DEVICE — BIOPSY FORCEP COLD DISP

## (undated) DEVICE — TUBING IV SET GRAVITY 3Y 100" MACRO

## (undated) DEVICE — DVC SAMPLING INFINITY ERCP 7.5FR 200CM

## (undated) DEVICE — TROCAR COVIDIEN BLUNT TIP HASSAN 12MM

## (undated) DEVICE — VENODYNE/SCD SLEEVE CALF MEDIUM

## (undated) DEVICE — TUBING SUCTION NONCONDUCTIVE 6MM X 12FT

## (undated) DEVICE — ENDOCATCH 10MM SPECIMEN POUCH

## (undated) DEVICE — SYR LUER LOK 3CC

## (undated) DEVICE — GLV 7.5 PROTEXIS (WHITE)

## (undated) DEVICE — CONTAINER FORMALIN 80ML YELLOW

## (undated) DEVICE — ELCTR BOVIE PENCIL SMOKE EVACUATION

## (undated) DEVICE — BIOPSY FORCEP RADIAL JAW 4 STANDARD WITH NEEDLE

## (undated) DEVICE — SOL IRR POUR NS 0.9% 500ML

## (undated) DEVICE — LUBRICATING JELLY HR ONE SHOT 3G

## (undated) DEVICE — SNARE DISP

## (undated) DEVICE — CLAMP BX HOT RAD JAW 3

## (undated) DEVICE — PACK IV START WITH CHG

## (undated) DEVICE — TUBING OLYMPUS INSUFFLATION

## (undated) DEVICE — TUBING INSUFFLATION LAP FILTER 10FT

## (undated) DEVICE — FORCEP RADIAL JAW 4 W NDL 2.4MM 2.8MM 240CM ORANGE DISP

## (undated) DEVICE — ANESTHESIA CIRCUIT ADULT HMEF

## (undated) DEVICE — SNARE EXACTO COLD 9MMX230CM

## (undated) DEVICE — INFINITY SAMPLING DEVICE ERCP 9FRX200CM

## (undated) DEVICE — BLADE SURGICAL #15 CARBON

## (undated) DEVICE — TUBING MEDI-VAC W MAXIGRIP CONNECTORS 1/4"X6'

## (undated) DEVICE — SHEARS COVIDIEN ENDO SHEAR 5MM X 31CM W UNIPOLAR CAUTERY

## (undated) DEVICE — SOL INJ NS 0.9% 1000ML

## (undated) DEVICE — SUT MONOCRYL 4-0 27" PS-2 UNDYED

## (undated) DEVICE — TUBING HYDRO-SURG PLUS IRRIGATOR W SMOKEVAC & PROBE

## (undated) DEVICE — NDL HYPO SAFE 22G X 1" (BLACK)

## (undated) DEVICE — BASIN SET SINGLE